# Patient Record
Sex: FEMALE | Race: WHITE | Employment: OTHER | URBAN - METROPOLITAN AREA
[De-identification: names, ages, dates, MRNs, and addresses within clinical notes are randomized per-mention and may not be internally consistent; named-entity substitution may affect disease eponyms.]

---

## 2017-07-12 LAB
ALBUMIN SERPL-MCNC: 4.1 G/DL
ALP BLD-CCNC: 88 U/L
ALT SERPL-CCNC: 20 U/L
ANION GAP SERPL CALCULATED.3IONS-SCNC: 2 MMOL/L
AST SERPL-CCNC: 20 U/L
AVERAGE GLUCOSE: NORMAL
BASOPHILS ABSOLUTE: 20 /ΜL
BASOPHILS RELATIVE PERCENT: 1 %
BILIRUB SERPL-MCNC: 0.6 MG/DL (ref 0.1–1.4)
BUN BLDV-MCNC: 20 MG/DL
CALCIUM SERPL-MCNC: 9.5 MG/DL
CHLORIDE BLD-SCNC: 105 MMOL/L
CHOLESTEROL, TOTAL: 223 MG/DL
CHOLESTEROL/HDL RATIO: 2.9
CO2: 31 MMOL/L
CREAT SERPL-MCNC: 0.89 MG/DL
EOSINOPHILS ABSOLUTE: 60 /ΜL
EOSINOPHILS RELATIVE PERCENT: 2 %
GFR CALCULATED: NORMAL
GLUCOSE BLD-MCNC: 95 MG/DL
HBA1C MFR BLD: 5.3 %
HCT VFR BLD CALC: 38.8 % (ref 36–46)
HDLC SERPL-MCNC: 77 MG/DL (ref 35–70)
HEMOGLOBIN: 12.5 G/DL (ref 12–16)
LDL CHOLESTEROL CALCULATED: 124 MG/DL (ref 0–160)
LYMPHOCYTES ABSOLUTE: 720 /ΜL
LYMPHOCYTES RELATIVE PERCENT: 23 %
MCH RBC QN AUTO: 31.2 PG
MCHC RBC AUTO-ENTMCNC: 32.2 G/DL
MCV RBC AUTO: 97 FL
MONOCYTES ABSOLUTE: 350 /ΜL
MONOCYTES RELATIVE PERCENT: 11 %
NEUTROPHILS ABSOLUTE: 2040 /ΜL
NEUTROPHILS RELATIVE PERCENT: 64 %
PDW BLD-RTO: 15.2 %
PLATELET # BLD: 137 K/ΜL
PMV BLD AUTO: 8.6 FL
POTASSIUM SERPL-SCNC: 4.6 MMOL/L
RBC # BLD: 4.01 10^6/ΜL
SODIUM BLD-SCNC: 143 MMOL/L
TOTAL PROTEIN: 6.2
TRIGL SERPL-MCNC: 111 MG/DL
TSH SERPL DL<=0.05 MIU/L-ACNC: 1.72 UIU/ML
VITAMIN D 25-HYDROXY: 34
VITAMIN D2, 25 HYDROXY: NORMAL
VITAMIN D3,25 HYDROXY: NORMAL
VLDLC SERPL CALC-MCNC: ABNORMAL MG/DL
WBC # BLD: 3.2 10^3/ML

## 2018-05-10 DIAGNOSIS — E55.9 VITAMIN D DEFICIENCY: ICD-10-CM

## 2018-05-10 DIAGNOSIS — R13.10 DYSPHAGIA, UNSPECIFIED TYPE: ICD-10-CM

## 2018-05-10 DIAGNOSIS — M15.0 PRIMARY GENERALIZED (OSTEO)ARTHRITIS: ICD-10-CM

## 2018-05-10 DIAGNOSIS — R53.83 FATIGUE, UNSPECIFIED TYPE: Primary | ICD-10-CM

## 2018-05-10 DIAGNOSIS — Z79.899 ENCOUNTER FOR LONG-TERM (CURRENT) USE OF MEDICATIONS: ICD-10-CM

## 2018-05-10 DIAGNOSIS — E78.5 HYPERLIPIDEMIA, UNSPECIFIED HYPERLIPIDEMIA TYPE: ICD-10-CM

## 2018-05-10 DIAGNOSIS — E03.9 ACQUIRED HYPOTHYROIDISM: ICD-10-CM

## 2018-05-10 DIAGNOSIS — R06.02 SOB (SHORTNESS OF BREATH): ICD-10-CM

## 2018-05-15 ENCOUNTER — OFFICE VISIT (OUTPATIENT)
Dept: PRIMARY CARE CLINIC | Age: 83
End: 2018-05-15
Payer: MEDICARE

## 2018-05-15 VITALS
RESPIRATION RATE: 24 BRPM | WEIGHT: 126 LBS | SYSTOLIC BLOOD PRESSURE: 122 MMHG | DIASTOLIC BLOOD PRESSURE: 80 MMHG | TEMPERATURE: 98.6 F | BODY MASS INDEX: 24.74 KG/M2 | OXYGEN SATURATION: 96 % | HEIGHT: 60 IN | HEART RATE: 71 BPM

## 2018-05-15 DIAGNOSIS — R53.83 FATIGUE, UNSPECIFIED TYPE: Primary | ICD-10-CM

## 2018-05-15 DIAGNOSIS — M89.9 DISORDER OF BONE: ICD-10-CM

## 2018-05-15 PROCEDURE — G8420 CALC BMI NORM PARAMETERS: HCPCS | Performed by: PHYSICIAN ASSISTANT

## 2018-05-15 PROCEDURE — G8399 PT W/DXA RESULTS DOCUMENT: HCPCS | Performed by: PHYSICIAN ASSISTANT

## 2018-05-15 PROCEDURE — 1123F ACP DISCUSS/DSCN MKR DOCD: CPT | Performed by: PHYSICIAN ASSISTANT

## 2018-05-15 PROCEDURE — G8427 DOCREV CUR MEDS BY ELIG CLIN: HCPCS | Performed by: PHYSICIAN ASSISTANT

## 2018-05-15 PROCEDURE — 99213 OFFICE O/P EST LOW 20 MIN: CPT | Performed by: PHYSICIAN ASSISTANT

## 2018-05-15 PROCEDURE — 1036F TOBACCO NON-USER: CPT | Performed by: PHYSICIAN ASSISTANT

## 2018-05-15 PROCEDURE — 1090F PRES/ABSN URINE INCON ASSESS: CPT | Performed by: PHYSICIAN ASSISTANT

## 2018-05-15 PROCEDURE — 4040F PNEUMOC VAC/ADMIN/RCVD: CPT | Performed by: PHYSICIAN ASSISTANT

## 2018-05-15 RX ORDER — PHENOL 1.4 %
2 AEROSOL, SPRAY (ML) MUCOUS MEMBRANE EVERY EVENING
COMMUNITY
End: 2018-09-14 | Stop reason: ALTCHOICE

## 2018-05-15 ASSESSMENT — ENCOUNTER SYMPTOMS
BLOOD IN STOOL: 0
SHORTNESS OF BREATH: 0
CONSTIPATION: 0
VOMITING: 0
CHEST TIGHTNESS: 0
COUGH: 0
NAUSEA: 0
WHEEZING: 0
DIARRHEA: 0
ABDOMINAL PAIN: 0
APNEA: 0

## 2018-05-17 ENCOUNTER — TELEPHONE (OUTPATIENT)
Dept: PRIMARY CARE CLINIC | Age: 83
End: 2018-05-17

## 2018-05-29 DIAGNOSIS — I89.0 LYMPHEDEMA: ICD-10-CM

## 2018-05-29 DIAGNOSIS — G47.00 INSOMNIA, UNSPECIFIED TYPE: ICD-10-CM

## 2018-05-29 DIAGNOSIS — I35.0 AORTIC VALVE STENOSIS, ETIOLOGY OF CARDIAC VALVE DISEASE UNSPECIFIED: ICD-10-CM

## 2018-05-29 DIAGNOSIS — R53.83 FATIGUE, UNSPECIFIED TYPE: Primary | ICD-10-CM

## 2018-05-29 DIAGNOSIS — M15.0 PRIMARY GENERALIZED (OSTEO)ARTHRITIS: ICD-10-CM

## 2018-05-29 DIAGNOSIS — E03.9 ACQUIRED HYPOTHYROIDISM: ICD-10-CM

## 2018-05-29 DIAGNOSIS — Z79.899 ENCOUNTER FOR LONG-TERM (CURRENT) USE OF MEDICATIONS: ICD-10-CM

## 2018-05-29 DIAGNOSIS — R13.10 DYSPHAGIA, UNSPECIFIED TYPE: ICD-10-CM

## 2018-05-29 DIAGNOSIS — E55.9 VITAMIN D DEFICIENCY: ICD-10-CM

## 2018-05-30 ENCOUNTER — HOSPITAL ENCOUNTER (OUTPATIENT)
Age: 83
Discharge: HOME | End: 2018-06-01
Payer: MEDICARE

## 2018-05-30 DIAGNOSIS — E55.9 VITAMIN D DEFICIENCY: ICD-10-CM

## 2018-05-30 DIAGNOSIS — R53.83 FATIGUE, UNSPECIFIED TYPE: ICD-10-CM

## 2018-05-30 DIAGNOSIS — I35.0 AORTIC VALVE STENOSIS, ETIOLOGY OF CARDIAC VALVE DISEASE UNSPECIFIED: ICD-10-CM

## 2018-05-30 DIAGNOSIS — R13.10 DYSPHAGIA, UNSPECIFIED TYPE: ICD-10-CM

## 2018-05-30 DIAGNOSIS — I89.0 LYMPHEDEMA: ICD-10-CM

## 2018-05-30 DIAGNOSIS — E03.9 ACQUIRED HYPOTHYROIDISM: ICD-10-CM

## 2018-05-30 DIAGNOSIS — M15.0 PRIMARY GENERALIZED (OSTEO)ARTHRITIS: ICD-10-CM

## 2018-05-30 DIAGNOSIS — Z79.899 ENCOUNTER FOR LONG-TERM (CURRENT) USE OF MEDICATIONS: ICD-10-CM

## 2018-05-30 LAB
ALBUMIN SERPL-MCNC: 4.1 G/DL (ref 3.5–5.2)
ALP BLD-CCNC: 85 U/L (ref 35–104)
ALT SERPL-CCNC: 15 U/L (ref 0–32)
ANION GAP SERPL CALCULATED.3IONS-SCNC: 13 MMOL/L (ref 7–16)
AST SERPL-CCNC: 20 U/L (ref 0–31)
BASOPHILS ABSOLUTE: 0.05 E9/L (ref 0–0.2)
BASOPHILS RELATIVE PERCENT: 1.6 % (ref 0–2)
BILIRUB SERPL-MCNC: 0.6 MG/DL (ref 0–1.2)
BUN BLDV-MCNC: 17 MG/DL (ref 8–23)
CALCIUM SERPL-MCNC: 9.2 MG/DL (ref 8.6–10.2)
CHLORIDE BLD-SCNC: 102 MMOL/L (ref 98–107)
CO2: 28 MMOL/L (ref 22–29)
CREAT SERPL-MCNC: 0.8 MG/DL (ref 0.5–1)
EOSINOPHILS ABSOLUTE: 0.11 E9/L (ref 0.05–0.5)
EOSINOPHILS RELATIVE PERCENT: 3.5 % (ref 0–6)
FOLATE: >20 NG/ML (ref 4.8–24.2)
GFR AFRICAN AMERICAN: >60
GFR NON-AFRICAN AMERICAN: >60 ML/MIN/1.73
GLUCOSE BLD-MCNC: 99 MG/DL (ref 74–109)
HCT VFR BLD CALC: 39.8 % (ref 34–48)
HEMOGLOBIN: 12.5 G/DL (ref 11.5–15.5)
IMMATURE GRANULOCYTES #: 0.01 E9/L
IMMATURE GRANULOCYTES %: 0.3 % (ref 0–5)
LYMPHOCYTES ABSOLUTE: 0.87 E9/L (ref 1.5–4)
LYMPHOCYTES RELATIVE PERCENT: 27.5 % (ref 20–42)
MCH RBC QN AUTO: 31.2 PG (ref 26–35)
MCHC RBC AUTO-ENTMCNC: 31.4 % (ref 32–34.5)
MCV RBC AUTO: 99.3 FL (ref 80–99.9)
MONOCYTES ABSOLUTE: 0.37 E9/L (ref 0.1–0.95)
MONOCYTES RELATIVE PERCENT: 11.7 % (ref 2–12)
NEUTROPHILS ABSOLUTE: 1.75 E9/L (ref 1.8–7.3)
NEUTROPHILS RELATIVE PERCENT: 55.4 % (ref 43–80)
PDW BLD-RTO: 14 FL (ref 11.5–15)
PLATELET # BLD: 148 E9/L (ref 130–450)
PMV BLD AUTO: 10.8 FL (ref 7–12)
POTASSIUM SERPL-SCNC: 4.2 MMOL/L (ref 3.5–5)
RBC # BLD: 4.01 E12/L (ref 3.5–5.5)
SODIUM BLD-SCNC: 143 MMOL/L (ref 132–146)
T4 FREE: 1.3 NG/DL (ref 0.93–1.7)
TOTAL PROTEIN: 6.3 G/DL (ref 6.4–8.3)
TSH SERPL DL<=0.05 MIU/L-ACNC: 2.21 UIU/ML (ref 0.27–4.2)
VITAMIN B-12: 842 PG/ML (ref 211–946)
VITAMIN D 25-HYDROXY: 52 NG/ML (ref 30–100)
WBC # BLD: 3.2 E9/L (ref 4.5–11.5)

## 2018-05-30 PROCEDURE — 84439 ASSAY OF FREE THYROXINE: CPT

## 2018-05-30 PROCEDURE — 85025 COMPLETE CBC W/AUTO DIFF WBC: CPT

## 2018-05-30 PROCEDURE — 82746 ASSAY OF FOLIC ACID SERUM: CPT

## 2018-05-30 PROCEDURE — 82306 VITAMIN D 25 HYDROXY: CPT

## 2018-05-30 PROCEDURE — 84443 ASSAY THYROID STIM HORMONE: CPT

## 2018-05-30 PROCEDURE — 82607 VITAMIN B-12: CPT

## 2018-05-30 PROCEDURE — 80053 COMPREHEN METABOLIC PANEL: CPT

## 2018-06-01 DIAGNOSIS — G47.00 INSOMNIA, UNSPECIFIED TYPE: ICD-10-CM

## 2018-06-01 RX ORDER — TRAZODONE HYDROCHLORIDE 50 MG/1
25 TABLET ORAL NIGHTLY
Qty: 15 TABLET | Refills: 0 | Status: SHIPPED | OUTPATIENT
Start: 2018-06-01 | End: 2018-07-09 | Stop reason: SDUPTHER

## 2018-06-01 RX ORDER — PANTOPRAZOLE SODIUM 20 MG/1
20 TABLET, DELAYED RELEASE ORAL DAILY
Qty: 30 TABLET | Refills: 0 | Status: SHIPPED | OUTPATIENT
Start: 2018-06-01 | End: 2018-06-27 | Stop reason: SDUPTHER

## 2018-06-01 RX ORDER — TRAZODONE HYDROCHLORIDE 50 MG/1
50 TABLET ORAL
COMMUNITY
End: 2018-06-01 | Stop reason: CLARIF

## 2018-06-08 ENCOUNTER — OFFICE VISIT (OUTPATIENT)
Dept: NEUROLOGY | Age: 83
End: 2018-06-08
Payer: MEDICARE

## 2018-06-08 VITALS
HEIGHT: 60 IN | OXYGEN SATURATION: 97 % | DIASTOLIC BLOOD PRESSURE: 80 MMHG | WEIGHT: 125.6 LBS | HEART RATE: 84 BPM | SYSTOLIC BLOOD PRESSURE: 175 MMHG | BODY MASS INDEX: 24.66 KG/M2

## 2018-06-08 DIAGNOSIS — G30.0 EARLY ONSET ALZHEIMER'S DEMENTIA WITHOUT BEHAVIORAL DISTURBANCE (HCC): Primary | ICD-10-CM

## 2018-06-08 DIAGNOSIS — F02.80 EARLY ONSET ALZHEIMER'S DEMENTIA WITHOUT BEHAVIORAL DISTURBANCE (HCC): Primary | ICD-10-CM

## 2018-06-08 PROCEDURE — 99214 OFFICE O/P EST MOD 30 MIN: CPT | Performed by: CLINICAL NURSE SPECIALIST

## 2018-06-08 PROCEDURE — G8399 PT W/DXA RESULTS DOCUMENT: HCPCS | Performed by: CLINICAL NURSE SPECIALIST

## 2018-06-08 PROCEDURE — 1123F ACP DISCUSS/DSCN MKR DOCD: CPT | Performed by: CLINICAL NURSE SPECIALIST

## 2018-06-08 PROCEDURE — 1036F TOBACCO NON-USER: CPT | Performed by: CLINICAL NURSE SPECIALIST

## 2018-06-08 PROCEDURE — G8420 CALC BMI NORM PARAMETERS: HCPCS | Performed by: CLINICAL NURSE SPECIALIST

## 2018-06-08 PROCEDURE — 1090F PRES/ABSN URINE INCON ASSESS: CPT | Performed by: CLINICAL NURSE SPECIALIST

## 2018-06-08 PROCEDURE — G8427 DOCREV CUR MEDS BY ELIG CLIN: HCPCS | Performed by: CLINICAL NURSE SPECIALIST

## 2018-06-08 PROCEDURE — 4040F PNEUMOC VAC/ADMIN/RCVD: CPT | Performed by: CLINICAL NURSE SPECIALIST

## 2018-06-27 RX ORDER — PANTOPRAZOLE SODIUM 20 MG/1
20 TABLET, DELAYED RELEASE ORAL DAILY
Qty: 90 TABLET | Refills: 0 | Status: SHIPPED | OUTPATIENT
Start: 2018-06-27 | End: 2018-09-14 | Stop reason: ALTCHOICE

## 2018-07-09 RX ORDER — TRAZODONE HYDROCHLORIDE 50 MG/1
25 TABLET ORAL NIGHTLY
Qty: 15 TABLET | Refills: 0 | Status: SHIPPED | OUTPATIENT
Start: 2018-07-09 | End: 2018-08-06 | Stop reason: SDUPTHER

## 2018-08-06 RX ORDER — TRAZODONE HYDROCHLORIDE 50 MG/1
TABLET ORAL
Qty: 15 TABLET | Refills: 0 | Status: SHIPPED | OUTPATIENT
Start: 2018-08-06 | End: 2018-09-28

## 2018-08-07 ENCOUNTER — HOSPITAL ENCOUNTER (OUTPATIENT)
Age: 83
Discharge: HOME | End: 2018-08-09
Payer: MEDICARE

## 2018-08-07 DIAGNOSIS — Z79.899 ENCOUNTER FOR LONG-TERM (CURRENT) USE OF MEDICATIONS: ICD-10-CM

## 2018-08-07 DIAGNOSIS — M15.0 PRIMARY GENERALIZED (OSTEO)ARTHRITIS: ICD-10-CM

## 2018-08-07 DIAGNOSIS — E03.9 ACQUIRED HYPOTHYROIDISM: ICD-10-CM

## 2018-08-07 DIAGNOSIS — R06.02 SOB (SHORTNESS OF BREATH): ICD-10-CM

## 2018-08-07 DIAGNOSIS — E78.5 HYPERLIPIDEMIA, UNSPECIFIED HYPERLIPIDEMIA TYPE: ICD-10-CM

## 2018-08-07 DIAGNOSIS — E55.9 VITAMIN D DEFICIENCY: ICD-10-CM

## 2018-08-07 DIAGNOSIS — R53.83 FATIGUE, UNSPECIFIED TYPE: ICD-10-CM

## 2018-08-07 DIAGNOSIS — R13.10 DYSPHAGIA, UNSPECIFIED TYPE: ICD-10-CM

## 2018-08-07 LAB
ALBUMIN SERPL-MCNC: 4.3 G/DL (ref 3.5–5.2)
ALP BLD-CCNC: 93 U/L (ref 35–104)
ALT SERPL-CCNC: 18 U/L (ref 0–32)
ANION GAP SERPL CALCULATED.3IONS-SCNC: 20 MMOL/L (ref 7–16)
AST SERPL-CCNC: 22 U/L (ref 0–31)
BACTERIA: ABNORMAL /HPF
BASOPHILS ABSOLUTE: 0.04 E9/L (ref 0–0.2)
BASOPHILS RELATIVE PERCENT: 1 % (ref 0–2)
BILIRUB SERPL-MCNC: 0.7 MG/DL (ref 0–1.2)
BILIRUBIN URINE: NEGATIVE
BLOOD, URINE: NEGATIVE
BUN BLDV-MCNC: 19 MG/DL (ref 8–23)
CALCIUM SERPL-MCNC: 9.5 MG/DL (ref 8.6–10.2)
CHLORIDE BLD-SCNC: 101 MMOL/L (ref 98–107)
CHOLESTEROL, TOTAL: 226 MG/DL (ref 0–199)
CLARITY: CLEAR
CO2: 24 MMOL/L (ref 22–29)
COLOR: YELLOW
CREAT SERPL-MCNC: 0.9 MG/DL (ref 0.5–1)
CRYSTALS, UA: ABNORMAL
EOSINOPHILS ABSOLUTE: 0.07 E9/L (ref 0.05–0.5)
EOSINOPHILS RELATIVE PERCENT: 1.7 % (ref 0–6)
GFR AFRICAN AMERICAN: >60
GFR NON-AFRICAN AMERICAN: 59 ML/MIN/1.73
GLUCOSE BLD-MCNC: 94 MG/DL (ref 74–109)
GLUCOSE URINE: NEGATIVE MG/DL
HBA1C MFR BLD: 5.5 % (ref 4–5.6)
HCT VFR BLD CALC: 41.1 % (ref 34–48)
HDLC SERPL-MCNC: 75 MG/DL
HEMOGLOBIN: 12.8 G/DL (ref 11.5–15.5)
IMMATURE GRANULOCYTES #: 0.01 E9/L
IMMATURE GRANULOCYTES %: 0.2 % (ref 0–5)
KETONES, URINE: NEGATIVE MG/DL
LDL CHOLESTEROL CALCULATED: 132 MG/DL (ref 0–99)
LEUKOCYTE ESTERASE, URINE: ABNORMAL
LYMPHOCYTES ABSOLUTE: 1.1 E9/L (ref 1.5–4)
LYMPHOCYTES RELATIVE PERCENT: 26.4 % (ref 20–42)
MCH RBC QN AUTO: 30.7 PG (ref 26–35)
MCHC RBC AUTO-ENTMCNC: 31.1 % (ref 32–34.5)
MCV RBC AUTO: 98.6 FL (ref 80–99.9)
MONOCYTES ABSOLUTE: 0.54 E9/L (ref 0.1–0.95)
MONOCYTES RELATIVE PERCENT: 13 % (ref 2–12)
NEUTROPHILS ABSOLUTE: 2.4 E9/L (ref 1.8–7.3)
NEUTROPHILS RELATIVE PERCENT: 57.7 % (ref 43–80)
NITRITE, URINE: NEGATIVE
PDW BLD-RTO: 14.2 FL (ref 11.5–15)
PH UA: 5.5 (ref 5–9)
PLATELET # BLD: 150 E9/L (ref 130–450)
PMV BLD AUTO: 11.2 FL (ref 7–12)
POTASSIUM SERPL-SCNC: 4.4 MMOL/L (ref 3.5–5)
PROTEIN UA: NEGATIVE MG/DL
RBC # BLD: 4.17 E12/L (ref 3.5–5.5)
RBC UA: ABNORMAL /HPF (ref 0–2)
SODIUM BLD-SCNC: 145 MMOL/L (ref 132–146)
SPECIFIC GRAVITY UA: >=1.03 (ref 1–1.03)
TOTAL PROTEIN: 6.7 G/DL (ref 6.4–8.3)
TRIGL SERPL-MCNC: 97 MG/DL (ref 0–149)
TSH SERPL DL<=0.05 MIU/L-ACNC: 1.36 UIU/ML (ref 0.27–4.2)
URIC ACID, SERUM: 4.5 MG/DL (ref 2.4–5.7)
UROBILINOGEN, URINE: 0.2 E.U./DL
VITAMIN D 25-HYDROXY: 57 NG/ML (ref 30–100)
VLDLC SERPL CALC-MCNC: 19 MG/DL
WBC # BLD: 4.2 E9/L (ref 4.5–11.5)
WBC UA: ABNORMAL /HPF (ref 0–5)

## 2018-08-07 PROCEDURE — 84443 ASSAY THYROID STIM HORMONE: CPT

## 2018-08-07 PROCEDURE — 85025 COMPLETE CBC W/AUTO DIFF WBC: CPT

## 2018-08-07 PROCEDURE — 84550 ASSAY OF BLOOD/URIC ACID: CPT

## 2018-08-07 PROCEDURE — 83036 HEMOGLOBIN GLYCOSYLATED A1C: CPT

## 2018-08-07 PROCEDURE — 81001 URINALYSIS AUTO W/SCOPE: CPT

## 2018-08-07 PROCEDURE — 80061 LIPID PANEL: CPT

## 2018-08-07 PROCEDURE — 82306 VITAMIN D 25 HYDROXY: CPT

## 2018-08-07 PROCEDURE — 80053 COMPREHEN METABOLIC PANEL: CPT

## 2018-08-13 ENCOUNTER — OFFICE VISIT (OUTPATIENT)
Dept: PRIMARY CARE CLINIC | Age: 83
End: 2018-08-13
Payer: MEDICARE

## 2018-08-13 VITALS
TEMPERATURE: 98.9 F | HEART RATE: 83 BPM | DIASTOLIC BLOOD PRESSURE: 58 MMHG | WEIGHT: 128 LBS | BODY MASS INDEX: 25.13 KG/M2 | SYSTOLIC BLOOD PRESSURE: 108 MMHG | OXYGEN SATURATION: 95 % | HEIGHT: 60 IN | RESPIRATION RATE: 16 BRPM

## 2018-08-13 DIAGNOSIS — Z23 NEED FOR PROPHYLACTIC VACCINATION AGAINST STREPTOCOCCUS PNEUMONIAE (PNEUMOCOCCUS): Primary | ICD-10-CM

## 2018-08-13 DIAGNOSIS — R53.83 FATIGUE, UNSPECIFIED TYPE: ICD-10-CM

## 2018-08-13 DIAGNOSIS — Z12.31 ENCOUNTER FOR SCREENING MAMMOGRAM FOR MALIGNANT NEOPLASM OF BREAST: ICD-10-CM

## 2018-08-13 DIAGNOSIS — E78.2 MIXED HYPERLIPIDEMIA: ICD-10-CM

## 2018-08-13 DIAGNOSIS — C85.99 GASTRIC LYMPHOMA (HCC): ICD-10-CM

## 2018-08-13 DIAGNOSIS — I89.0 LYMPHEDEMA OF BOTH LOWER EXTREMITIES: Chronic | ICD-10-CM

## 2018-08-13 DIAGNOSIS — M51.9 LUMBAR DISC DISEASE: ICD-10-CM

## 2018-08-13 DIAGNOSIS — R13.19 ESOPHAGEAL DYSPHAGIA: ICD-10-CM

## 2018-08-13 DIAGNOSIS — Z00.00 ROUTINE GENERAL MEDICAL EXAMINATION AT A HEALTH CARE FACILITY: ICD-10-CM

## 2018-08-13 DIAGNOSIS — Z83.3 FHX: DIABETES MELLITUS: ICD-10-CM

## 2018-08-13 DIAGNOSIS — G47.9 SLEEP DISORDER: ICD-10-CM

## 2018-08-13 DIAGNOSIS — I35.1 NONRHEUMATIC AORTIC VALVE INSUFFICIENCY: Chronic | ICD-10-CM

## 2018-08-13 PROCEDURE — 4040F PNEUMOC VAC/ADMIN/RCVD: CPT | Performed by: INTERNAL MEDICINE

## 2018-08-13 PROCEDURE — G0439 PPPS, SUBSEQ VISIT: HCPCS | Performed by: INTERNAL MEDICINE

## 2018-08-13 ASSESSMENT — ANXIETY QUESTIONNAIRES: GAD7 TOTAL SCORE: 2

## 2018-08-13 ASSESSMENT — PATIENT HEALTH QUESTIONNAIRE - PHQ9
SUM OF ALL RESPONSES TO PHQ QUESTIONS 1-9: 2
SUM OF ALL RESPONSES TO PHQ QUESTIONS 1-9: 2

## 2018-08-13 ASSESSMENT — LIFESTYLE VARIABLES: HOW OFTEN DO YOU HAVE A DRINK CONTAINING ALCOHOL: 0

## 2018-08-13 NOTE — PROGRESS NOTES
This patient was last in the office on 18 she was back again for complaints of fatigue on 5/15/18. She was describing inability to sleep. Has a host of ongoing chronic conditions and after reviewing, nothing much is any change with regards to the fact that she's having a subsequent annual wellness visit today. Her medications will stay the same. We did discuss other chronic conditions which would include the followin esophageal narrowing with dysphagia secondary to esophagitis , we'll need to see  at this time, referral made    2. Sleep disturbance with chronic fatigue. Referral made to sleep specialist Dr. Ene Elizalde    3. Aortic valve disease, saw Dr. Nguyen her cardiologist recently will see him in 6 months    4. Chronic lumbar disc disease , sees , pain management, will have epidural injection in the coming few days    5. Hyper lipidemia    6. Family history of type 2 diabetes    7. History of gastric lymphoma, followed  Oncologist [facility], appointment made for follow-up with this doctor for the patient    8. Hyperlipidemia    9. Patient is not had mammogram that she can recall having no records can be found. Will review this with her the next time I see her and order it at that time    10. Vitamin D deficiency, on vitamin D replacement with good results    11. Gait disturbance secondary to lumbar disc disease uses a cane or walker when trying to walk any distance. Must be aware of potential for falls    Reviewed diagnostic lab studies done prior to this visit to generally at goal with the exception of lipids are slightly elevated. Plan:  1. Continue current meds  2. I'll arrange consultation with gastroenterology, pulmonary, oncology  3. No longer seeing neurology who have seen her before for memory dysfunction? 4. I will update laboratory studies prior to next visit with me in 6 months  5. Immunizations reviewed, they're up-to-date.

## 2018-08-13 NOTE — PROGRESS NOTES
Medicare Annual Wellness Visit  Name: Vonda Armando Date: 2018   MRN: [id number] Sex: Female   Age: 80 y.o. Ethnicity: Non-/Non    : 3/14/1933 Race: Troy Burnett is here for Medicare AWV; Depression; Hypertension; Gastroesophageal Reflux; Discuss Labs; and Health Maintenance (due for tdap, shingles,pneumo)    Screenings for behavioral, psychosocial and functional/safety risks, and cognitive dysfunction are all negative except as indicated below. These results, as well as other patient data from the [address] form, are documented in Flowsheets linked to this Encounter. Allergies   Allergen Reactions    Codeine     Vicodin [Hydrocodone-Acetaminophen]      Prior to Visit Medications    Medication Sig Taking?  Authorizing Provider   traZODone (DESYREL) 50 MG tablet TAKE 1/2 TABLET BY MOUTH NIGHTLY Yes Samantha Snowden MD   pantoprazole (PROTONIX) 20 MG tablet Take 1 tablet by mouth daily Yes Samantha Snowden MD   Melatonin 10 MG TABS Take 2 tablets by mouth every evening  Yes Historical Provider, MD   Multiple Vitamins-Minerals (PRESERVISION AREDS 2 PO) Take by mouth Yes Historical Provider, MD   Coenzyme Q10 (COQ10) 100 MG CAPS Take by mouth daily Yes Historical Provider, MD   Multiple Vitamin (MULTI VITAMIN DAILY PO) Take by mouth Yes Historical Provider, MD   irbesartan (AVAPRO) 300 MG tablet Take 300 mg by mouth daily  Yes Historical Provider, MD     Past Medical History:   Diagnosis Date    Anxiety     Arthritis     Back pain     Blood circulation, collateral     Cancer of esophagus ([facility] [address]     Carotid artery stenosis     Carotid bruit 2012    Carotid stenosis, right 2012    GERD (gastroesophageal reflux disease)     GERD (gastroesophageal reflux disease)     Hypertension     Lymphedema of lower extremity 2012    Peripheral vascular disease ([facility] [address]      Past Surgical History:   Procedure Laterality Date    CARDIAC SURGERY range of motion, no joint swelling, deformity or tenderness  Neurologic: reflexes normal and symmetric, no cranial nerve deficit, gait, coordination and speech normal    Patient's complete Health Risk Assessment and screening values have been reviewed and are found in Flowsheets. The following problems were reviewed today and where indicated follow up appointments were made and/or referrals ordered. Positive Risk Factor Screenings with Interventions:     General Health:  General  In general, how would you say your health is?: Good  In the past 7 days, have you experienced any of the following?: (!) New or Increased Fatigue, Stress  Do you get the social and emotional support that you need?: Yes  Do you have a Living Will?: Yes  General Health Risk Interventions:  · None indicated    Health Habits/Nutrition:  Health Habits/Nutrition  Do you exercise for at least 20 minutes 2-3 times per week?: Yes  Have you lost any weight without trying in the past 3 months?: No  Do you eat fewer than 2 meals per day?: (!) Yes  Have you seen a dentist within the past year?: Yes  Body mass index is 25 kg/m².   Health Habits/Nutrition Interventions:  · None indicated    Hearing/Vision:  Hearing/Vision  Do you or your family notice any trouble with your hearing?: (!) Yes  Do you have difficulty driving, watching TV, or doing any of your daily activities because of your eyesight?: (!) Yes  Have you had an eye exam within the past year?: Yes  Hearing/Vision Interventions:  · None indicated    Safety:  Safety  Do you have working smoke detectors?: (!) No  Have all throw rugs been removed or fastened?: Yes  Do you have non-slip mats in all bathtubs?: Yes  Do all of your stairways have a railing or banister?: Yes  Are your doorways, halls and stairs free of clutter?: Yes  Do you always fasten your seatbelt when you are in a car?: Yes  Safety Interventions:  · None indicated    ADL:  ADLs  In the past 7 days, did you need help from others

## 2018-08-16 ENCOUNTER — APPOINTMENT (OUTPATIENT)
Dept: CT IMAGING | Age: 83
DRG: 184 | End: 2018-08-16
Payer: OTHER MISCELLANEOUS

## 2018-08-16 ENCOUNTER — HOSPITAL ENCOUNTER (INPATIENT)
Age: 83
LOS: 4 days | Discharge: NURSING, REHAB OR LONG-TERM CARE | DRG: 184 | End: 2018-08-21
Attending: EMERGENCY MEDICINE | Admitting: SURGERY
Payer: OTHER MISCELLANEOUS

## 2018-08-16 ENCOUNTER — APPOINTMENT (OUTPATIENT)
Dept: GENERAL RADIOLOGY | Age: 83
DRG: 184 | End: 2018-08-16
Payer: OTHER MISCELLANEOUS

## 2018-08-16 DIAGNOSIS — S39.91XA BLUNT TRAUMA TO ABDOMEN, INITIAL ENCOUNTER: ICD-10-CM
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
## 2018-08-16 DIAGNOSIS — S29.9XXA CHEST WALL TRAUMA: ICD-10-CM

## 2018-08-16 DIAGNOSIS — S22.20XA CLOSED FRACTURE OF STERNUM, UNSPECIFIED PORTION OF STERNUM, INITIAL ENCOUNTER: ICD-10-CM

## 2018-08-16 LAB
ALBUMIN SERPL-MCNC: 4 G/DL (ref 3.5–5.2)
ALP BLD-CCNC: 96 U/L (ref 35–104)
ALT SERPL-CCNC: 23 U/L (ref 0–32)
AMYLASE: 81 U/L (ref 20–100)
ANION GAP SERPL CALCULATED.3IONS-SCNC: 12 MMOL/L (ref 7–16)
APTT: 25.3 SEC (ref 24.5–35.1)
AST SERPL-CCNC: 26 U/L (ref 0–31)
BACTERIA: NORMAL /HPF
BILIRUB SERPL-MCNC: 0.5 MG/DL (ref 0–1.2)
BILIRUBIN DIRECT: <0.2 MG/DL (ref 0–0.3)
BILIRUBIN URINE: NEGATIVE
BILIRUBIN, INDIRECT: NORMAL MG/DL (ref 0–1)
BLOOD, URINE: NEGATIVE
BUN BLDV-MCNC: 22 MG/DL (ref 8–23)
CALCIUM SERPL-MCNC: 9.5 MG/DL (ref 8.6–10.2)
CASTS: NORMAL /LPF
CHLORIDE BLD-SCNC: 102 MMOL/L (ref 98–107)
CK MB: 1.9 NG/ML (ref 0–4.3)
CLARITY: CLEAR
CO2: 27 MMOL/L (ref 22–29)
COLOR: YELLOW
CREAT SERPL-MCNC: 0.9 MG/DL (ref 0.5–1)
EKG ATRIAL RATE: 91 BPM
EKG P AXIS: 65 DEGREES
EKG P-R INTERVAL: 198 MS
EKG Q-T INTERVAL: 346 MS
EKG QRS DURATION: 92 MS
EKG QTC CALCULATION (BAZETT): 425 MS
EKG R AXIS: 29 DEGREES
EKG T AXIS: 60 DEGREES
EKG VENTRICULAR RATE: 91 BPM
EPITHELIAL CELLS, UA: NORMAL /HPF
GFR AFRICAN AMERICAN: >60
GFR NON-AFRICAN AMERICAN: 59 ML/MIN/1.73
GLUCOSE BLD-MCNC: 100 MG/DL (ref 74–109)
GLUCOSE URINE: NEGATIVE MG/DL
HCT VFR BLD CALC: 40.1 % (ref 34–48)
HEMOGLOBIN: 13.4 G/DL (ref 11.5–15.5)
INR BLD: 1
KETONES, URINE: NEGATIVE MG/DL
LACTIC ACID: 0.7 MMOL/L (ref 0.5–2.2)
LEUKOCYTE ESTERASE, URINE: ABNORMAL
LIPASE: 45 U/L (ref 13–60)
MAGNESIUM: 2.2 MG/DL (ref 1.6–2.6)
MCH RBC QN AUTO: 31.8 PG (ref 26–35)
MCHC RBC AUTO-ENTMCNC: 33.4 % (ref 32–34.5)
MCV RBC AUTO: 95 FL (ref 80–99.9)
NITRITE, URINE: NEGATIVE
PDW BLD-RTO: 14.2 FL (ref 11.5–15)
PH UA: 7 (ref 5–9)
PLATELET # BLD: 146 E9/L (ref 130–450)
PMV BLD AUTO: 10 FL (ref 7–12)
POTASSIUM SERPL-SCNC: 4.3 MMOL/L (ref 3.5–5)
PRO-BNP: 575 PG/ML (ref 0–450)
PROTEIN UA: NEGATIVE MG/DL
PROTHROMBIN TIME: 11.4 SEC (ref 9.3–12.4)
RBC # BLD: 4.22 E12/L (ref 3.5–5.5)
RBC UA: NORMAL /HPF (ref 0–2)
SODIUM BLD-SCNC: 141 MMOL/L (ref 132–146)
SPECIFIC GRAVITY UA: 1.01 (ref 1–1.03)
TOTAL CK: 56 U/L (ref 20–180)
TOTAL PROTEIN: 6.6 G/DL (ref 6.4–8.3)
TROPONIN: <0.01 NG/ML (ref 0–0.03)
UROBILINOGEN, URINE: 0.2 E.U./DL
WBC # BLD: 4.9 E9/L (ref 4.5–11.5)
WBC UA: NORMAL /HPF (ref 0–5)

## 2018-08-16 PROCEDURE — 83605 ASSAY OF LACTIC ACID: CPT

## 2018-08-16 PROCEDURE — 82553 CREATINE MB FRACTION: CPT

## 2018-08-16 PROCEDURE — 74177 CT ABD & PELVIS W/CONTRAST: CPT

## 2018-08-16 PROCEDURE — 99285 EMERGENCY DEPT VISIT HI MDM: CPT

## 2018-08-16 PROCEDURE — 81001 URINALYSIS AUTO W/SCOPE: CPT

## 2018-08-16 PROCEDURE — 85730 THROMBOPLASTIN TIME PARTIAL: CPT

## 2018-08-16 PROCEDURE — 83735 ASSAY OF MAGNESIUM: CPT

## 2018-08-16 PROCEDURE — 96374 THER/PROPH/DIAG INJ IV PUSH: CPT

## 2018-08-16 PROCEDURE — 36415 COLL VENOUS BLD VENIPUNCTURE: CPT

## 2018-08-16 PROCEDURE — 71045 X-RAY EXAM CHEST 1 VIEW: CPT

## 2018-08-16 PROCEDURE — 85610 PROTHROMBIN TIME: CPT

## 2018-08-16 PROCEDURE — 80076 HEPATIC FUNCTION PANEL: CPT

## 2018-08-16 PROCEDURE — 82550 ASSAY OF CK (CPK): CPT

## 2018-08-16 PROCEDURE — 6360000002 HC RX W HCPCS: Performed by: EMERGENCY MEDICINE

## 2018-08-16 PROCEDURE — 93005 ELECTROCARDIOGRAM TRACING: CPT | Performed by: EMERGENCY MEDICINE

## 2018-08-16 PROCEDURE — 85027 COMPLETE CBC AUTOMATED: CPT

## 2018-08-16 PROCEDURE — 83880 ASSAY OF NATRIURETIC PEPTIDE: CPT

## 2018-08-16 PROCEDURE — 96375 TX/PRO/DX INJ NEW DRUG ADDON: CPT

## 2018-08-16 PROCEDURE — 84484 ASSAY OF TROPONIN QUANT: CPT

## 2018-08-16 PROCEDURE — 82150 ASSAY OF AMYLASE: CPT

## 2018-08-16 PROCEDURE — 71260 CT THORAX DX C+: CPT

## 2018-08-16 PROCEDURE — 83690 ASSAY OF LIPASE: CPT

## 2018-08-16 PROCEDURE — 80048 BASIC METABOLIC PNL TOTAL CA: CPT

## 2018-08-16 RX ORDER — MORPHINE SULFATE 2 MG/ML
2 INJECTION, SOLUTION INTRAMUSCULAR; INTRAVENOUS ONCE
Status: COMPLETED | OUTPATIENT
Start: 2018-08-16 | End: 2018-08-16

## 2018-08-16 RX ORDER — ONDANSETRON 2 MG/ML
4 INJECTION INTRAMUSCULAR; INTRAVENOUS ONCE
Status: COMPLETED | OUTPATIENT
Start: 2018-08-16 | End: 2018-08-16

## 2018-08-16 RX ADMIN — MORPHINE SULFATE 2 MG: 2 INJECTION, SOLUTION INTRAMUSCULAR; INTRAVENOUS at 22:07

## 2018-08-16 RX ADMIN — ONDANSETRON 4 MG: 2 INJECTION, SOLUTION INTRAMUSCULAR; INTRAVENOUS at 22:07

## 2018-08-16 ASSESSMENT — PAIN DESCRIPTION - FREQUENCY: FREQUENCY: CONTINUOUS

## 2018-08-16 ASSESSMENT — PAIN DESCRIPTION - ORIENTATION: ORIENTATION: MID

## 2018-08-16 ASSESSMENT — PAIN SCALES - GENERAL
PAINLEVEL_OUTOF10: 9
PAINLEVEL_OUTOF10: 8
PAINLEVEL_OUTOF10: 8

## 2018-08-16 ASSESSMENT — PAIN DESCRIPTION - LOCATION
LOCATION: CHEST
LOCATION: CHEST

## 2018-08-16 ASSESSMENT — PAIN DESCRIPTION - DESCRIPTORS
DESCRIPTORS: DISCOMFORT
DESCRIPTORS: SORE;ACHING;SHARP

## 2018-08-16 ASSESSMENT — PAIN DESCRIPTION - PAIN TYPE: TYPE: ACUTE PAIN

## 2018-08-17 PROBLEM — S22.22XA FRACTURE OF BODY OF STERNUM, INITIAL ENCOUNTER FOR CLOSED FRACTURE: Status: ACTIVE | Noted: 2018-08-17

## 2018-08-17 PROBLEM — S29.9XXA CHEST WALL TRAUMA: Status: ACTIVE | Noted: 2018-08-17

## 2018-08-17 PROBLEM — S22.20XA CLOSED FRACTURE OF STERNUM: Status: ACTIVE | Noted: 2018-08-17
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
## 2018-08-17 PROBLEM — S39.91XA BLUNT INJURY OF ABDOMEN: Status: ACTIVE | Noted: 2018-08-17

## 2018-08-17 LAB
ALBUMIN SERPL-MCNC: 4 G/DL (ref 3.5–5.2)
ALP BLD-CCNC: 90 U/L (ref 35–104)
ALT SERPL-CCNC: 20 U/L (ref 0–32)
ANION GAP SERPL CALCULATED.3IONS-SCNC: 13 MMOL/L (ref 7–16)
AST SERPL-CCNC: 24 U/L (ref 0–31)
BASOPHILS ABSOLUTE: 0.05 E9/L (ref 0–0.2)
BASOPHILS RELATIVE PERCENT: 0.6 % (ref 0–2)
BILIRUB SERPL-MCNC: 0.7 MG/DL (ref 0–1.2)
BUN BLDV-MCNC: 17 MG/DL (ref 8–23)
CALCIUM SERPL-MCNC: 9.2 MG/DL (ref 8.6–10.2)
CHLORIDE BLD-SCNC: 101 MMOL/L (ref 98–107)
CO2: 29 MMOL/L (ref 22–29)
CREAT SERPL-MCNC: 0.8 MG/DL (ref 0.5–1)
EOSINOPHILS ABSOLUTE: 0.13 E9/L (ref 0.05–0.5)
EOSINOPHILS RELATIVE PERCENT: 1.6 % (ref 0–6)
GFR AFRICAN AMERICAN: >60
GFR NON-AFRICAN AMERICAN: >60 ML/MIN/1.73
GLUCOSE BLD-MCNC: 92 MG/DL (ref 74–109)
HCT VFR BLD CALC: 40 % (ref 34–48)
HEMOGLOBIN: 13.3 G/DL (ref 11.5–15.5)
IMMATURE GRANULOCYTES #: 0.06 E9/L
IMMATURE GRANULOCYTES %: 0.7 % (ref 0–5)
LYMPHOCYTES ABSOLUTE: 1.13 E9/L (ref 1.5–4)
LYMPHOCYTES RELATIVE PERCENT: 14 % (ref 20–42)
MCH RBC QN AUTO: 31.4 PG (ref 26–35)
MCHC RBC AUTO-ENTMCNC: 33.3 % (ref 32–34.5)
MCV RBC AUTO: 94.3 FL (ref 80–99.9)
METER GLUCOSE: 116 MG/DL (ref 70–110)
MONOCYTES ABSOLUTE: 0.65 E9/L (ref 0.1–0.95)
MONOCYTES RELATIVE PERCENT: 8 % (ref 2–12)
NEUTROPHILS ABSOLUTE: 6.07 E9/L (ref 1.8–7.3)
NEUTROPHILS RELATIVE PERCENT: 75.1 % (ref 43–80)
PDW BLD-RTO: 14.2 FL (ref 11.5–15)
PLATELET # BLD: 143 E9/L (ref 130–450)
PMV BLD AUTO: 10.5 FL (ref 7–12)
POTASSIUM REFLEX MAGNESIUM: 4.8 MMOL/L (ref 3.5–5)
RBC # BLD: 4.24 E12/L (ref 3.5–5.5)
SODIUM BLD-SCNC: 143 MMOL/L (ref 132–146)
TOTAL PROTEIN: 6.3 G/DL (ref 6.4–8.3)
WBC # BLD: 8.1 E9/L (ref 4.5–11.5)

## 2018-08-17 PROCEDURE — 6360000004 HC RX CONTRAST MEDICATION: Performed by: RADIOLOGY

## 2018-08-17 PROCEDURE — 85025 COMPLETE CBC W/AUTO DIFF WBC: CPT

## 2018-08-17 PROCEDURE — G8978 MOBILITY CURRENT STATUS: HCPCS

## 2018-08-17 PROCEDURE — G8988 SELF CARE GOAL STATUS: HCPCS

## 2018-08-17 PROCEDURE — 82962 GLUCOSE BLOOD TEST: CPT

## 2018-08-17 PROCEDURE — 2060000000 HC ICU INTERMEDIATE R&B

## 2018-08-17 PROCEDURE — 99233 SBSQ HOSP IP/OBS HIGH 50: CPT | Performed by: SURGERY

## 2018-08-17 PROCEDURE — G8987 SELF CARE CURRENT STATUS: HCPCS

## 2018-08-17 PROCEDURE — 97535 SELF CARE MNGMENT TRAINING: CPT

## 2018-08-17 PROCEDURE — 6370000000 HC RX 637 (ALT 250 FOR IP): Performed by: STUDENT IN AN ORGANIZED HEALTH CARE EDUCATION/TRAINING PROGRAM

## 2018-08-17 PROCEDURE — G8979 MOBILITY GOAL STATUS: HCPCS

## 2018-08-17 PROCEDURE — 97530 THERAPEUTIC ACTIVITIES: CPT

## 2018-08-17 PROCEDURE — 36415 COLL VENOUS BLD VENIPUNCTURE: CPT

## 2018-08-17 PROCEDURE — 80053 COMPREHEN METABOLIC PANEL: CPT

## 2018-08-17 PROCEDURE — 97162 PT EVAL MOD COMPLEX 30 MIN: CPT

## 2018-08-17 PROCEDURE — 97166 OT EVAL MOD COMPLEX 45 MIN: CPT

## 2018-08-17 PROCEDURE — 2580000003 HC RX 258: Performed by: RADIOLOGY

## 2018-08-17 PROCEDURE — 2580000003 HC RX 258: Performed by: STUDENT IN AN ORGANIZED HEALTH CARE EDUCATION/TRAINING PROGRAM

## 2018-08-17 RX ORDER — MORPHINE SULFATE 2 MG/ML
2 INJECTION, SOLUTION INTRAMUSCULAR; INTRAVENOUS EVERY 4 HOURS PRN
Status: DISCONTINUED | OUTPATIENT
Start: 2018-08-17 | End: 2018-08-17

## 2018-08-17 RX ORDER — LIDOCAINE 50 MG/G
1 PATCH TOPICAL DAILY
Status: DISCONTINUED | OUTPATIENT
Start: 2018-08-17 | End: 2018-08-21 | Stop reason: HOSPADM

## 2018-08-17 RX ORDER — NALOXONE HYDROCHLORIDE 0.4 MG/ML
INJECTION, SOLUTION INTRAMUSCULAR; INTRAVENOUS; SUBCUTANEOUS
Status: DISPENSED
Start: 2018-08-17 | End: 2018-08-18

## 2018-08-17 RX ORDER — TRAZODONE HYDROCHLORIDE 50 MG/1
25 TABLET ORAL NIGHTLY
Status: DISCONTINUED | OUTPATIENT
Start: 2018-08-17 | End: 2018-08-21 | Stop reason: HOSPADM

## 2018-08-17 RX ORDER — SODIUM CHLORIDE 0.9 % (FLUSH) 0.9 %
10 SYRINGE (ML) INJECTION PRN
Status: DISCONTINUED | OUTPATIENT
Start: 2018-08-17 | End: 2018-08-21 | Stop reason: HOSPADM

## 2018-08-17 RX ORDER — OXYCODONE HYDROCHLORIDE AND ACETAMINOPHEN 5; 325 MG/1; MG/1
1 TABLET ORAL EVERY 4 HOURS PRN
Status: DISCONTINUED | OUTPATIENT
Start: 2018-08-17 | End: 2018-08-17

## 2018-08-17 RX ORDER — BISACODYL 10 MG
10 SUPPOSITORY, RECTAL RECTAL DAILY PRN
Status: DISCONTINUED | OUTPATIENT
Start: 2018-08-17 | End: 2018-08-21 | Stop reason: HOSPADM

## 2018-08-17 RX ORDER — SODIUM CHLORIDE 0.9 % (FLUSH) 0.9 %
10 SYRINGE (ML) INJECTION PRN
Status: COMPLETED | OUTPATIENT
Start: 2018-08-17 | End: 2018-08-17

## 2018-08-17 RX ORDER — ACETAMINOPHEN 325 MG/1
650 TABLET ORAL EVERY 4 HOURS PRN
Status: DISCONTINUED | OUTPATIENT
Start: 2018-08-17 | End: 2018-08-21 | Stop reason: HOSPADM

## 2018-08-17 RX ORDER — METHOCARBAMOL 500 MG/1
500 TABLET, FILM COATED ORAL 4 TIMES DAILY
Status: DISCONTINUED | OUTPATIENT
Start: 2018-08-17 | End: 2018-08-17

## 2018-08-17 RX ORDER — METHOCARBAMOL 500 MG/1
500 TABLET, FILM COATED ORAL 4 TIMES DAILY
Status: DISCONTINUED | OUTPATIENT
Start: 2018-08-17 | End: 2018-08-21 | Stop reason: HOSPADM

## 2018-08-17 RX ORDER — ONDANSETRON 2 MG/ML
4 INJECTION INTRAMUSCULAR; INTRAVENOUS EVERY 6 HOURS PRN
Status: DISCONTINUED | OUTPATIENT
Start: 2018-08-17 | End: 2018-08-21 | Stop reason: HOSPADM

## 2018-08-17 RX ORDER — DOCUSATE SODIUM 100 MG/1
100 CAPSULE, LIQUID FILLED ORAL 2 TIMES DAILY
Status: DISCONTINUED | OUTPATIENT
Start: 2018-08-17 | End: 2018-08-21 | Stop reason: HOSPADM

## 2018-08-17 RX ORDER — MORPHINE SULFATE 2 MG/ML
1 INJECTION, SOLUTION INTRAMUSCULAR; INTRAVENOUS EVERY 4 HOURS PRN
Status: DISCONTINUED | OUTPATIENT
Start: 2018-08-17 | End: 2018-08-19 | Stop reason: SDUPTHER

## 2018-08-17 RX ORDER — OXYCODONE HYDROCHLORIDE AND ACETAMINOPHEN 5; 325 MG/1; MG/1
2 TABLET ORAL EVERY 4 HOURS PRN
Status: DISCONTINUED | OUTPATIENT
Start: 2018-08-17 | End: 2018-08-17

## 2018-08-17 RX ORDER — MORPHINE SULFATE 2 MG/ML
2 INJECTION, SOLUTION INTRAMUSCULAR; INTRAVENOUS EVERY 4 HOURS PRN
Status: DISCONTINUED | OUTPATIENT
Start: 2018-08-17 | End: 2018-08-17 | Stop reason: SDUPTHER

## 2018-08-17 RX ORDER — GABAPENTIN 100 MG/1
100 CAPSULE ORAL 3 TIMES DAILY
Status: DISCONTINUED | OUTPATIENT
Start: 2018-08-17 | End: 2018-08-21 | Stop reason: HOSPADM

## 2018-08-17 RX ORDER — SODIUM CHLORIDE 9 MG/ML
INJECTION, SOLUTION INTRAVENOUS CONTINUOUS
Status: DISCONTINUED | OUTPATIENT
Start: 2018-08-17 | End: 2018-08-19

## 2018-08-17 RX ORDER — LANOLIN ALCOHOL/MO/W.PET/CERES
9 CREAM (GRAM) TOPICAL EVERY EVENING
Status: DISCONTINUED | OUTPATIENT
Start: 2018-08-17 | End: 2018-08-21 | Stop reason: HOSPADM

## 2018-08-17 RX ORDER — LOSARTAN POTASSIUM 50 MG/1
100 TABLET ORAL DAILY
Status: DISCONTINUED | OUTPATIENT
Start: 2018-08-17 | End: 2018-08-21 | Stop reason: HOSPADM

## 2018-08-17 RX ORDER — OXYCODONE HYDROCHLORIDE AND ACETAMINOPHEN 5; 325 MG/1; MG/1
1 TABLET ORAL EVERY 4 HOURS PRN
Status: DISCONTINUED | OUTPATIENT
Start: 2018-08-17 | End: 2018-08-20

## 2018-08-17 RX ORDER — PANTOPRAZOLE SODIUM 20 MG/1
20 TABLET, DELAYED RELEASE ORAL DAILY
Status: DISCONTINUED | OUTPATIENT
Start: 2018-08-17 | End: 2018-08-21 | Stop reason: HOSPADM

## 2018-08-17 RX ORDER — SODIUM CHLORIDE 0.9 % (FLUSH) 0.9 %
10 SYRINGE (ML) INJECTION EVERY 12 HOURS SCHEDULED
Status: DISCONTINUED | OUTPATIENT
Start: 2018-08-17 | End: 2018-08-21 | Stop reason: HOSPADM

## 2018-08-17 RX ADMIN — TRAZODONE HYDROCHLORIDE 25 MG: 50 TABLET ORAL at 22:05

## 2018-08-17 RX ADMIN — IOPAMIDOL 110 ML: 755 INJECTION, SOLUTION INTRAVENOUS at 00:22

## 2018-08-17 RX ADMIN — Medication 10 ML: at 00:23

## 2018-08-17 RX ADMIN — Medication 10 ML: at 09:16

## 2018-08-17 RX ADMIN — OXYCODONE HYDROCHLORIDE AND ACETAMINOPHEN 1 TABLET: 5; 325 TABLET ORAL at 19:17

## 2018-08-17 RX ADMIN — Medication 10 ML: at 22:06

## 2018-08-17 RX ADMIN — PANTOPRAZOLE SODIUM 20 MG: 20 TABLET, DELAYED RELEASE ORAL at 09:15

## 2018-08-17 RX ADMIN — SODIUM CHLORIDE: 9 INJECTION, SOLUTION INTRAVENOUS at 05:15

## 2018-08-17 RX ADMIN — GABAPENTIN 100 MG: 100 CAPSULE ORAL at 09:15

## 2018-08-17 RX ADMIN — METHOCARBAMOL 500 MG: 500 TABLET ORAL at 22:05

## 2018-08-17 RX ADMIN — DOCUSATE SODIUM 100 MG: 100 CAPSULE, LIQUID FILLED ORAL at 22:05

## 2018-08-17 RX ADMIN — METHOCARBAMOL 500 MG: 500 TABLET ORAL at 14:06

## 2018-08-17 RX ADMIN — LOSARTAN POTASSIUM 100 MG: 50 TABLET, FILM COATED ORAL at 09:15

## 2018-08-17 RX ADMIN — OXYCODONE HYDROCHLORIDE AND ACETAMINOPHEN 2 TABLET: 5; 325 TABLET ORAL at 05:21

## 2018-08-17 RX ADMIN — GABAPENTIN 100 MG: 100 CAPSULE ORAL at 22:06

## 2018-08-17 RX ADMIN — OXYCODONE HYDROCHLORIDE AND ACETAMINOPHEN 2 TABLET: 5; 325 TABLET ORAL at 14:06

## 2018-08-17 RX ADMIN — GABAPENTIN 100 MG: 100 CAPSULE ORAL at 14:06

## 2018-08-17 RX ADMIN — METHOCARBAMOL 500 MG: 500 TABLET ORAL at 09:15

## 2018-08-17 RX ADMIN — MELATONIN 3 MG ORAL TABLET 9 MG: 3 TABLET ORAL at 22:05

## 2018-08-17 RX ADMIN — DOCUSATE SODIUM 100 MG: 100 CAPSULE, LIQUID FILLED ORAL at 09:15

## 2018-08-17 ASSESSMENT — PAIN SCALES - GENERAL
PAINLEVEL_OUTOF10: 10
PAINLEVEL_OUTOF10: 7
PAINLEVEL_OUTOF10: 0
PAINLEVEL_OUTOF10: 8

## 2018-08-17 ASSESSMENT — PAIN DESCRIPTION - ORIENTATION
ORIENTATION: MID
ORIENTATION: MID

## 2018-08-17 ASSESSMENT — PAIN DESCRIPTION - PAIN TYPE
TYPE: ACUTE PAIN
TYPE: ACUTE PAIN

## 2018-08-17 ASSESSMENT — PAIN DESCRIPTION - LOCATION
LOCATION: CHEST
LOCATION: CHEST

## 2018-08-17 ASSESSMENT — PAIN DESCRIPTION - FREQUENCY: FREQUENCY: CONTINUOUS

## 2018-08-17 ASSESSMENT — PAIN DESCRIPTION - DESCRIPTORS: DESCRIPTORS: OTHER (COMMENT)

## 2018-08-18 LAB
ALBUMIN SERPL-MCNC: 3.1 G/DL (ref 3.5–5.2)
ALP BLD-CCNC: 64 U/L (ref 35–104)
ALT SERPL-CCNC: 14 U/L (ref 0–32)
ANION GAP SERPL CALCULATED.3IONS-SCNC: 11 MMOL/L (ref 7–16)
AST SERPL-CCNC: 18 U/L (ref 0–31)
BASOPHILS ABSOLUTE: 0.04 E9/L (ref 0–0.2)
BASOPHILS RELATIVE PERCENT: 0.7 % (ref 0–2)
BILIRUB SERPL-MCNC: 0.6 MG/DL (ref 0–1.2)
BUN BLDV-MCNC: 14 MG/DL (ref 8–23)
CALCIUM SERPL-MCNC: 8.1 MG/DL (ref 8.6–10.2)
CHLORIDE BLD-SCNC: 105 MMOL/L (ref 98–107)
CO2: 26 MMOL/L (ref 22–29)
CREAT SERPL-MCNC: 0.9 MG/DL (ref 0.5–1)
EOSINOPHILS ABSOLUTE: 0.21 E9/L (ref 0.05–0.5)
EOSINOPHILS RELATIVE PERCENT: 3.9 % (ref 0–6)
GFR AFRICAN AMERICAN: >60
GFR NON-AFRICAN AMERICAN: 59 ML/MIN/1.73
GLUCOSE BLD-MCNC: 100 MG/DL (ref 74–109)
HCT VFR BLD CALC: 32.7 % (ref 34–48)
HEMOGLOBIN: 10.4 G/DL (ref 11.5–15.5)
IMMATURE GRANULOCYTES #: 0.03 E9/L
IMMATURE GRANULOCYTES %: 0.6 % (ref 0–5)
LYMPHOCYTES ABSOLUTE: 0.99 E9/L (ref 1.5–4)
LYMPHOCYTES RELATIVE PERCENT: 18.3 % (ref 20–42)
MCH RBC QN AUTO: 30.8 PG (ref 26–35)
MCHC RBC AUTO-ENTMCNC: 31.8 % (ref 32–34.5)
MCV RBC AUTO: 96.7 FL (ref 80–99.9)
MONOCYTES ABSOLUTE: 0.57 E9/L (ref 0.1–0.95)
MONOCYTES RELATIVE PERCENT: 10.5 % (ref 2–12)
NEUTROPHILS ABSOLUTE: 3.57 E9/L (ref 1.8–7.3)
NEUTROPHILS RELATIVE PERCENT: 66 % (ref 43–80)
PDW BLD-RTO: 14.1 FL (ref 11.5–15)
PLATELET # BLD: 115 E9/L (ref 130–450)
PMV BLD AUTO: 10.4 FL (ref 7–12)
POTASSIUM REFLEX MAGNESIUM: 3.8 MMOL/L (ref 3.5–5)
RBC # BLD: 3.38 E12/L (ref 3.5–5.5)
SODIUM BLD-SCNC: 142 MMOL/L (ref 132–146)
TOTAL PROTEIN: 4.9 G/DL (ref 6.4–8.3)
WBC # BLD: 5.4 E9/L (ref 4.5–11.5)

## 2018-08-18 PROCEDURE — 6370000000 HC RX 637 (ALT 250 FOR IP): Performed by: STUDENT IN AN ORGANIZED HEALTH CARE EDUCATION/TRAINING PROGRAM

## 2018-08-18 PROCEDURE — 2580000003 HC RX 258: Performed by: STUDENT IN AN ORGANIZED HEALTH CARE EDUCATION/TRAINING PROGRAM

## 2018-08-18 PROCEDURE — 80053 COMPREHEN METABOLIC PANEL: CPT

## 2018-08-18 PROCEDURE — 6360000002 HC RX W HCPCS: Performed by: STUDENT IN AN ORGANIZED HEALTH CARE EDUCATION/TRAINING PROGRAM

## 2018-08-18 PROCEDURE — 85025 COMPLETE CBC W/AUTO DIFF WBC: CPT

## 2018-08-18 PROCEDURE — 99233 SBSQ HOSP IP/OBS HIGH 50: CPT | Performed by: SURGERY

## 2018-08-18 PROCEDURE — 36415 COLL VENOUS BLD VENIPUNCTURE: CPT

## 2018-08-18 PROCEDURE — 2060000000 HC ICU INTERMEDIATE R&B

## 2018-08-18 RX ORDER — HEPARIN SODIUM 10000 [USP'U]/ML
5000 INJECTION, SOLUTION INTRAVENOUS; SUBCUTANEOUS EVERY 8 HOURS SCHEDULED
Status: DISCONTINUED | OUTPATIENT
Start: 2018-08-18 | End: 2018-08-21 | Stop reason: HOSPADM

## 2018-08-18 RX ADMIN — DOCUSATE SODIUM 100 MG: 100 CAPSULE, LIQUID FILLED ORAL at 08:37

## 2018-08-18 RX ADMIN — HEPARIN SODIUM 5000 UNITS: 10000 INJECTION INTRAVENOUS; SUBCUTANEOUS at 21:03

## 2018-08-18 RX ADMIN — PANTOPRAZOLE SODIUM 20 MG: 20 TABLET, DELAYED RELEASE ORAL at 08:36

## 2018-08-18 RX ADMIN — METHOCARBAMOL 500 MG: 500 TABLET ORAL at 21:01

## 2018-08-18 RX ADMIN — HEPARIN SODIUM 5000 UNITS: 10000 INJECTION INTRAVENOUS; SUBCUTANEOUS at 08:36

## 2018-08-18 RX ADMIN — GABAPENTIN 100 MG: 100 CAPSULE ORAL at 08:36

## 2018-08-18 RX ADMIN — OXYCODONE HYDROCHLORIDE AND ACETAMINOPHEN 1 TABLET: 5; 325 TABLET ORAL at 15:58

## 2018-08-18 RX ADMIN — LOSARTAN POTASSIUM 100 MG: 50 TABLET, FILM COATED ORAL at 08:37

## 2018-08-18 RX ADMIN — METHOCARBAMOL 500 MG: 500 TABLET ORAL at 08:36

## 2018-08-18 RX ADMIN — GABAPENTIN 100 MG: 100 CAPSULE ORAL at 14:23

## 2018-08-18 RX ADMIN — SODIUM CHLORIDE: 9 INJECTION, SOLUTION INTRAVENOUS at 21:00

## 2018-08-18 RX ADMIN — DOCUSATE SODIUM 100 MG: 100 CAPSULE, LIQUID FILLED ORAL at 21:00

## 2018-08-18 RX ADMIN — SODIUM CHLORIDE: 9 INJECTION, SOLUTION INTRAVENOUS at 07:18

## 2018-08-18 RX ADMIN — HEPARIN SODIUM 5000 UNITS: 10000 INJECTION INTRAVENOUS; SUBCUTANEOUS at 14:23

## 2018-08-18 RX ADMIN — GABAPENTIN 100 MG: 100 CAPSULE ORAL at 21:01

## 2018-08-18 RX ADMIN — MELATONIN 3 MG ORAL TABLET 9 MG: 3 TABLET ORAL at 21:00

## 2018-08-18 RX ADMIN — OXYCODONE HYDROCHLORIDE AND ACETAMINOPHEN 1 TABLET: 5; 325 TABLET ORAL at 11:32

## 2018-08-18 RX ADMIN — METHOCARBAMOL 500 MG: 500 TABLET ORAL at 17:11

## 2018-08-18 RX ADMIN — TRAZODONE HYDROCHLORIDE 25 MG: 50 TABLET ORAL at 21:00

## 2018-08-18 RX ADMIN — OXYCODONE HYDROCHLORIDE AND ACETAMINOPHEN 1 TABLET: 5; 325 TABLET ORAL at 08:40

## 2018-08-18 RX ADMIN — METHOCARBAMOL 500 MG: 500 TABLET ORAL at 14:23

## 2018-08-18 ASSESSMENT — PAIN SCALES - GENERAL
PAINLEVEL_OUTOF10: 8
PAINLEVEL_OUTOF10: 5
PAINLEVEL_OUTOF10: 0
PAINLEVEL_OUTOF10: 7
PAINLEVEL_OUTOF10: 10
PAINLEVEL_OUTOF10: 10
PAINLEVEL_OUTOF10: 8

## 2018-08-18 ASSESSMENT — PAIN DESCRIPTION - LOCATION
LOCATION: BACK;CHEST;SHOULDER
LOCATION: BACK;CHEST;SHOULDER
LOCATION: BACK;CHEST
LOCATION: BACK;CHEST

## 2018-08-18 ASSESSMENT — PAIN DESCRIPTION - PAIN TYPE
TYPE: ACUTE PAIN

## 2018-08-18 ASSESSMENT — PAIN DESCRIPTION - FREQUENCY
FREQUENCY: CONTINUOUS

## 2018-08-18 ASSESSMENT — PAIN DESCRIPTION - DESCRIPTORS
DESCRIPTORS: CONSTANT;DISCOMFORT
DESCRIPTORS: CONSTANT;DISCOMFORT
DESCRIPTORS: DISCOMFORT

## 2018-08-19 LAB
ALBUMIN SERPL-MCNC: 3 G/DL (ref 3.5–5.2)
ALP BLD-CCNC: 67 U/L (ref 35–104)
ALT SERPL-CCNC: 13 U/L (ref 0–32)
ANION GAP SERPL CALCULATED.3IONS-SCNC: 10 MMOL/L (ref 7–16)
AST SERPL-CCNC: 16 U/L (ref 0–31)
BASOPHILS ABSOLUTE: 0.04 E9/L (ref 0–0.2)
BASOPHILS RELATIVE PERCENT: 0.9 % (ref 0–2)
BILIRUB SERPL-MCNC: 0.4 MG/DL (ref 0–1.2)
BUN BLDV-MCNC: 14 MG/DL (ref 8–23)
CALCIUM SERPL-MCNC: 8 MG/DL (ref 8.6–10.2)
CHLORIDE BLD-SCNC: 108 MMOL/L (ref 98–107)
CO2: 26 MMOL/L (ref 22–29)
CREAT SERPL-MCNC: 0.8 MG/DL (ref 0.5–1)
EOSINOPHILS ABSOLUTE: 0.21 E9/L (ref 0.05–0.5)
EOSINOPHILS RELATIVE PERCENT: 4.6 % (ref 0–6)
GFR AFRICAN AMERICAN: >60
GFR NON-AFRICAN AMERICAN: >60 ML/MIN/1.73
GLUCOSE BLD-MCNC: 95 MG/DL (ref 74–109)
HCT VFR BLD CALC: 31.8 % (ref 34–48)
HEMOGLOBIN: 10.5 G/DL (ref 11.5–15.5)
IMMATURE GRANULOCYTES #: 0.01 E9/L
IMMATURE GRANULOCYTES %: 0.2 % (ref 0–5)
LYMPHOCYTES ABSOLUTE: 1.01 E9/L (ref 1.5–4)
LYMPHOCYTES RELATIVE PERCENT: 22.1 % (ref 20–42)
MCH RBC QN AUTO: 31.9 PG (ref 26–35)
MCHC RBC AUTO-ENTMCNC: 33 % (ref 32–34.5)
MCV RBC AUTO: 96.7 FL (ref 80–99.9)
MONOCYTES ABSOLUTE: 0.39 E9/L (ref 0.1–0.95)
MONOCYTES RELATIVE PERCENT: 8.5 % (ref 2–12)
NEUTROPHILS ABSOLUTE: 2.92 E9/L (ref 1.8–7.3)
NEUTROPHILS RELATIVE PERCENT: 63.7 % (ref 43–80)
PDW BLD-RTO: 14.3 FL (ref 11.5–15)
PLATELET # BLD: 99 E9/L (ref 130–450)
PLATELET CONFIRMATION: NORMAL
PMV BLD AUTO: 10.5 FL (ref 7–12)
POTASSIUM REFLEX MAGNESIUM: 4.2 MMOL/L (ref 3.5–5)
RBC # BLD: 3.29 E12/L (ref 3.5–5.5)
SODIUM BLD-SCNC: 144 MMOL/L (ref 132–146)
TOTAL PROTEIN: 5 G/DL (ref 6.4–8.3)
WBC # BLD: 4.6 E9/L (ref 4.5–11.5)

## 2018-08-19 PROCEDURE — 97535 SELF CARE MNGMENT TRAINING: CPT

## 2018-08-19 PROCEDURE — 36415 COLL VENOUS BLD VENIPUNCTURE: CPT

## 2018-08-19 PROCEDURE — 1200000000 HC SEMI PRIVATE

## 2018-08-19 PROCEDURE — 6360000002 HC RX W HCPCS: Performed by: STUDENT IN AN ORGANIZED HEALTH CARE EDUCATION/TRAINING PROGRAM

## 2018-08-19 PROCEDURE — 80053 COMPREHEN METABOLIC PANEL: CPT

## 2018-08-19 PROCEDURE — 97530 THERAPEUTIC ACTIVITIES: CPT

## 2018-08-19 PROCEDURE — 2580000003 HC RX 258: Performed by: STUDENT IN AN ORGANIZED HEALTH CARE EDUCATION/TRAINING PROGRAM

## 2018-08-19 PROCEDURE — 99233 SBSQ HOSP IP/OBS HIGH 50: CPT | Performed by: SURGERY

## 2018-08-19 PROCEDURE — 6370000000 HC RX 637 (ALT 250 FOR IP): Performed by: STUDENT IN AN ORGANIZED HEALTH CARE EDUCATION/TRAINING PROGRAM

## 2018-08-19 PROCEDURE — 85025 COMPLETE CBC W/AUTO DIFF WBC: CPT

## 2018-08-19 RX ORDER — MORPHINE SULFATE 2 MG/ML
1 INJECTION, SOLUTION INTRAMUSCULAR; INTRAVENOUS EVERY 4 HOURS PRN
Status: DISCONTINUED | OUTPATIENT
Start: 2018-08-19 | End: 2018-08-20

## 2018-08-19 RX ORDER — VITS A,C,E/LUTEIN/MINERALS 300MCG-200
1 TABLET ORAL DAILY
Status: DISCONTINUED | OUTPATIENT
Start: 2018-08-19 | End: 2018-08-21 | Stop reason: HOSPADM

## 2018-08-19 RX ADMIN — HEPARIN SODIUM 5000 UNITS: 10000 INJECTION INTRAVENOUS; SUBCUTANEOUS at 05:35

## 2018-08-19 RX ADMIN — GABAPENTIN 100 MG: 100 CAPSULE ORAL at 10:13

## 2018-08-19 RX ADMIN — DOCUSATE SODIUM 100 MG: 100 CAPSULE, LIQUID FILLED ORAL at 10:13

## 2018-08-19 RX ADMIN — DOCUSATE SODIUM 100 MG: 100 CAPSULE, LIQUID FILLED ORAL at 20:04

## 2018-08-19 RX ADMIN — PANTOPRAZOLE SODIUM 20 MG: 20 TABLET, DELAYED RELEASE ORAL at 11:29

## 2018-08-19 RX ADMIN — METHOCARBAMOL 500 MG: 500 TABLET ORAL at 08:16

## 2018-08-19 RX ADMIN — GABAPENTIN 100 MG: 100 CAPSULE ORAL at 20:05

## 2018-08-19 RX ADMIN — OXYCODONE HYDROCHLORIDE AND ACETAMINOPHEN 1 TABLET: 5; 325 TABLET ORAL at 05:38

## 2018-08-19 RX ADMIN — OXYCODONE HYDROCHLORIDE AND ACETAMINOPHEN 1 TABLET: 5; 325 TABLET ORAL at 13:46

## 2018-08-19 RX ADMIN — MELATONIN 3 MG ORAL TABLET 9 MG: 3 TABLET ORAL at 20:05

## 2018-08-19 RX ADMIN — HEPARIN SODIUM 5000 UNITS: 10000 INJECTION INTRAVENOUS; SUBCUTANEOUS at 20:08

## 2018-08-19 RX ADMIN — METHOCARBAMOL 500 MG: 500 TABLET ORAL at 17:21

## 2018-08-19 RX ADMIN — Medication 10 ML: at 10:14

## 2018-08-19 RX ADMIN — GABAPENTIN 100 MG: 100 CAPSULE ORAL at 13:47

## 2018-08-19 RX ADMIN — TRAZODONE HYDROCHLORIDE 25 MG: 50 TABLET ORAL at 20:06

## 2018-08-19 RX ADMIN — HEPARIN SODIUM 5000 UNITS: 10000 INJECTION INTRAVENOUS; SUBCUTANEOUS at 13:47

## 2018-08-19 RX ADMIN — SODIUM CHLORIDE: 9 INJECTION, SOLUTION INTRAVENOUS at 11:31

## 2018-08-19 RX ADMIN — Medication 10 ML: at 20:06

## 2018-08-19 RX ADMIN — METHOCARBAMOL 500 MG: 500 TABLET ORAL at 12:33

## 2018-08-19 RX ADMIN — LOSARTAN POTASSIUM 100 MG: 50 TABLET, FILM COATED ORAL at 10:13

## 2018-08-19 RX ADMIN — METHOCARBAMOL 500 MG: 500 TABLET ORAL at 20:05

## 2018-08-19 ASSESSMENT — PAIN DESCRIPTION - DESCRIPTORS
DESCRIPTORS: ACHING;DISCOMFORT
DESCRIPTORS: CONSTANT

## 2018-08-19 ASSESSMENT — PAIN DESCRIPTION - LOCATION
LOCATION: CHEST
LOCATION: CHEST;STERNUM
LOCATION: CHEST

## 2018-08-19 ASSESSMENT — PAIN SCALES - GENERAL
PAINLEVEL_OUTOF10: 8
PAINLEVEL_OUTOF10: 8
PAINLEVEL_OUTOF10: 7
PAINLEVEL_OUTOF10: 6

## 2018-08-19 ASSESSMENT — PAIN DESCRIPTION - PAIN TYPE
TYPE: ACUTE PAIN

## 2018-08-19 ASSESSMENT — PAIN DESCRIPTION - FREQUENCY
FREQUENCY: CONTINUOUS
FREQUENCY: CONTINUOUS

## 2018-08-20 ENCOUNTER — TELEPHONE (OUTPATIENT)
Dept: PRIMARY CARE CLINIC | Age: 83
End: 2018-08-20

## 2018-08-20 PROCEDURE — 1200000000 HC SEMI PRIVATE

## 2018-08-20 PROCEDURE — 6360000002 HC RX W HCPCS: Performed by: STUDENT IN AN ORGANIZED HEALTH CARE EDUCATION/TRAINING PROGRAM

## 2018-08-20 PROCEDURE — 2580000003 HC RX 258: Performed by: STUDENT IN AN ORGANIZED HEALTH CARE EDUCATION/TRAINING PROGRAM

## 2018-08-20 PROCEDURE — 6370000000 HC RX 637 (ALT 250 FOR IP): Performed by: SURGERY

## 2018-08-20 PROCEDURE — 99233 SBSQ HOSP IP/OBS HIGH 50: CPT | Performed by: SURGERY

## 2018-08-20 PROCEDURE — 97530 THERAPEUTIC ACTIVITIES: CPT

## 2018-08-20 PROCEDURE — 6370000000 HC RX 637 (ALT 250 FOR IP): Performed by: STUDENT IN AN ORGANIZED HEALTH CARE EDUCATION/TRAINING PROGRAM

## 2018-08-20 RX ORDER — ACETAMINOPHEN 325 MG/1
650 TABLET ORAL EVERY 6 HOURS
Status: DISCONTINUED | OUTPATIENT
Start: 2018-08-20 | End: 2018-08-21 | Stop reason: HOSPADM

## 2018-08-20 RX ORDER — OXYCODONE HYDROCHLORIDE 5 MG/1
5 TABLET ORAL EVERY 6 HOURS PRN
Status: DISCONTINUED | OUTPATIENT
Start: 2018-08-20 | End: 2018-08-21 | Stop reason: HOSPADM

## 2018-08-20 RX ADMIN — TRAZODONE HYDROCHLORIDE 25 MG: 50 TABLET ORAL at 21:11

## 2018-08-20 RX ADMIN — OXYCODONE HYDROCHLORIDE AND ACETAMINOPHEN 1 TABLET: 5; 325 TABLET ORAL at 13:35

## 2018-08-20 RX ADMIN — Medication 10 ML: at 09:02

## 2018-08-20 RX ADMIN — DOCUSATE SODIUM 100 MG: 100 CAPSULE, LIQUID FILLED ORAL at 09:07

## 2018-08-20 RX ADMIN — GABAPENTIN 100 MG: 100 CAPSULE ORAL at 21:11

## 2018-08-20 RX ADMIN — DOCUSATE SODIUM 100 MG: 100 CAPSULE, LIQUID FILLED ORAL at 21:12

## 2018-08-20 RX ADMIN — ACETAMINOPHEN 650 MG: 325 TABLET, FILM COATED ORAL at 16:48

## 2018-08-20 RX ADMIN — LOSARTAN POTASSIUM 100 MG: 50 TABLET, FILM COATED ORAL at 09:02

## 2018-08-20 RX ADMIN — METHOCARBAMOL 500 MG: 500 TABLET ORAL at 09:01

## 2018-08-20 RX ADMIN — Medication 1 TABLET: at 09:02

## 2018-08-20 RX ADMIN — METHOCARBAMOL 500 MG: 500 TABLET ORAL at 21:11

## 2018-08-20 RX ADMIN — MELATONIN 3 MG ORAL TABLET 9 MG: 3 TABLET ORAL at 21:12

## 2018-08-20 RX ADMIN — HEPARIN SODIUM 5000 UNITS: 10000 INJECTION INTRAVENOUS; SUBCUTANEOUS at 14:43

## 2018-08-20 RX ADMIN — PANTOPRAZOLE SODIUM 20 MG: 20 TABLET, DELAYED RELEASE ORAL at 09:01

## 2018-08-20 RX ADMIN — GABAPENTIN 100 MG: 100 CAPSULE ORAL at 14:42

## 2018-08-20 RX ADMIN — METHOCARBAMOL 500 MG: 500 TABLET ORAL at 14:42

## 2018-08-20 RX ADMIN — HEPARIN SODIUM 5000 UNITS: 10000 INJECTION INTRAVENOUS; SUBCUTANEOUS at 06:33

## 2018-08-20 RX ADMIN — HEPARIN SODIUM 5000 UNITS: 10000 INJECTION INTRAVENOUS; SUBCUTANEOUS at 21:21

## 2018-08-20 RX ADMIN — GABAPENTIN 100 MG: 100 CAPSULE ORAL at 09:02

## 2018-08-20 RX ADMIN — METHOCARBAMOL 500 MG: 500 TABLET ORAL at 18:24

## 2018-08-20 RX ADMIN — Medication 10 ML: at 21:12

## 2018-08-20 ASSESSMENT — PAIN SCALES - GENERAL
PAINLEVEL_OUTOF10: 0
PAINLEVEL_OUTOF10: 6
PAINLEVEL_OUTOF10: 7
PAINLEVEL_OUTOF10: 6
PAINLEVEL_OUTOF10: 0

## 2018-08-21 VITALS
TEMPERATURE: 98.9 F | DIASTOLIC BLOOD PRESSURE: 68 MMHG | BODY MASS INDEX: 24.54 KG/M2 | HEART RATE: 85 BPM | RESPIRATION RATE: 16 BRPM | HEIGHT: 60 IN | SYSTOLIC BLOOD PRESSURE: 149 MMHG | OXYGEN SATURATION: 95 % | WEIGHT: 125 LBS
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
## 2018-08-21 PROCEDURE — 97535 SELF CARE MNGMENT TRAINING: CPT

## 2018-08-21 PROCEDURE — 97530 THERAPEUTIC ACTIVITIES: CPT

## 2018-08-21 PROCEDURE — 6370000000 HC RX 637 (ALT 250 FOR IP): Performed by: SURGERY

## 2018-08-21 PROCEDURE — 97110 THERAPEUTIC EXERCISES: CPT

## 2018-08-21 PROCEDURE — 6360000002 HC RX W HCPCS: Performed by: STUDENT IN AN ORGANIZED HEALTH CARE EDUCATION/TRAINING PROGRAM

## 2018-08-21 PROCEDURE — 2580000003 HC RX 258: Performed by: STUDENT IN AN ORGANIZED HEALTH CARE EDUCATION/TRAINING PROGRAM

## 2018-08-21 PROCEDURE — 6370000000 HC RX 637 (ALT 250 FOR IP): Performed by: STUDENT IN AN ORGANIZED HEALTH CARE EDUCATION/TRAINING PROGRAM

## 2018-08-21 RX ORDER — ACETAMINOPHEN 325 MG/1
650 TABLET ORAL EVERY 6 HOURS
Qty: 120 TABLET | Refills: 3 | DISCHARGE
Start: 2018-08-21 | End: 2020-01-01

## 2018-08-21 RX ADMIN — GABAPENTIN 100 MG: 100 CAPSULE ORAL at 09:28

## 2018-08-21 RX ADMIN — ACETAMINOPHEN 650 MG: 325 TABLET, FILM COATED ORAL at 09:28

## 2018-08-21 RX ADMIN — METHOCARBAMOL 500 MG: 500 TABLET ORAL at 09:27

## 2018-08-21 RX ADMIN — PANTOPRAZOLE SODIUM 20 MG: 20 TABLET, DELAYED RELEASE ORAL at 09:28

## 2018-08-21 RX ADMIN — LOSARTAN POTASSIUM 100 MG: 50 TABLET, FILM COATED ORAL at 09:27

## 2018-08-21 RX ADMIN — HEPARIN SODIUM 5000 UNITS: 10000 INJECTION INTRAVENOUS; SUBCUTANEOUS at 06:11

## 2018-08-21 RX ADMIN — ACETAMINOPHEN 650 MG: 325 TABLET, FILM COATED ORAL at 06:11

## 2018-08-21 RX ADMIN — DOCUSATE SODIUM 100 MG: 100 CAPSULE, LIQUID FILLED ORAL at 09:28

## 2018-08-21 RX ADMIN — Medication 10 ML: at 09:28

## 2018-08-21 RX ADMIN — Medication 1 TABLET: at 09:27

## 2018-08-21 ASSESSMENT — PAIN DESCRIPTION - ORIENTATION: ORIENTATION: MID

## 2018-08-21 ASSESSMENT — PAIN DESCRIPTION - FREQUENCY: FREQUENCY: CONTINUOUS

## 2018-08-21 ASSESSMENT — PAIN DESCRIPTION - PAIN TYPE: TYPE: ACUTE PAIN

## 2018-08-21 ASSESSMENT — PAIN SCALES - GENERAL
PAINLEVEL_OUTOF10: 0
PAINLEVEL_OUTOF10: 7
PAINLEVEL_OUTOF10: 4
PAINLEVEL_OUTOF10: 5
PAINLEVEL_OUTOF10: 7

## 2018-08-21 ASSESSMENT — PAIN DESCRIPTION - LOCATION: LOCATION: CHEST

## 2018-08-21 ASSESSMENT — PAIN DESCRIPTION - ONSET: ONSET: ON-GOING

## 2018-08-21 ASSESSMENT — PAIN DESCRIPTION - PROGRESSION: CLINICAL_PROGRESSION: NOT CHANGED

## 2018-08-21 ASSESSMENT — PAIN DESCRIPTION - DESCRIPTORS: DESCRIPTORS: ACHING;DISCOMFORT

## 2018-08-23 ENCOUNTER — TELEPHONE (OUTPATIENT)
Dept: SURGERY | Age: 83
End: 2018-08-23

## 2018-08-23 NOTE — TELEPHONE ENCOUNTER
MA received another phone call from Sonia Thomas at [facility] stating the family the appointment on a Friday. MA rescheduled to first available Friday 9/14/18 at 1:00PM.  Sonia Thomas verbalized understanding.     Electronically signed by Krissy Nicole on 8/23/18 at 9:52 AM

## 2018-09-12 PROBLEM — Z12.31 ENCOUNTER FOR SCREENING MAMMOGRAM FOR MALIGNANT NEOPLASM OF BREAST: Status: RESOLVED | Noted: 2018-08-13 | Resolved: 2018-09-12

## 2018-09-14 ENCOUNTER — OFFICE VISIT (OUTPATIENT)
Dept: SURGERY | Age: 83
End: 2018-09-14
Payer: MEDICARE

## 2018-09-14 VITALS
HEART RATE: 69 BPM | BODY MASS INDEX: 25.13 KG/M2 | TEMPERATURE: 98 F | WEIGHT: 128 LBS | OXYGEN SATURATION: 96 % | DIASTOLIC BLOOD PRESSURE: 65 MMHG | SYSTOLIC BLOOD PRESSURE: 143 MMHG | RESPIRATION RATE: 16 BRPM | HEIGHT: 60 IN

## 2018-09-14 DIAGNOSIS — Z09 FOLLOW UP: Primary | ICD-10-CM

## 2018-09-14 DIAGNOSIS — S22.20XD CLOSED FRACTURE OF STERNUM WITH ROUTINE HEALING, UNSPECIFIED PORTION OF STERNUM, SUBSEQUENT ENCOUNTER: ICD-10-CM

## 2018-09-14 DIAGNOSIS — S39.91XD BLUNT TRAUMA TO ABDOMEN, SUBSEQUENT ENCOUNTER: ICD-10-CM

## 2018-09-14 PROCEDURE — G8427 DOCREV CUR MEDS BY ELIG CLIN: HCPCS | Performed by: NURSE PRACTITIONER

## 2018-09-14 PROCEDURE — G8399 PT W/DXA RESULTS DOCUMENT: HCPCS | Performed by: NURSE PRACTITIONER

## 2018-09-14 PROCEDURE — 1101F PT FALLS ASSESS-DOCD LE1/YR: CPT | Performed by: NURSE PRACTITIONER

## 2018-09-14 PROCEDURE — 99212 OFFICE O/P EST SF 10 MIN: CPT | Performed by: NURSE PRACTITIONER

## 2018-09-14 PROCEDURE — 4040F PNEUMOC VAC/ADMIN/RCVD: CPT | Performed by: NURSE PRACTITIONER

## 2018-09-14 PROCEDURE — 1123F ACP DISCUSS/DSCN MKR DOCD: CPT | Performed by: NURSE PRACTITIONER

## 2018-09-14 PROCEDURE — G8419 CALC BMI OUT NRM PARAM NOF/U: HCPCS | Performed by: NURSE PRACTITIONER

## 2018-09-14 PROCEDURE — 1111F DSCHRG MED/CURRENT MED MERGE: CPT | Performed by: NURSE PRACTITIONER

## 2018-09-14 PROCEDURE — 1036F TOBACCO NON-USER: CPT | Performed by: NURSE PRACTITIONER

## 2018-09-14 PROCEDURE — 1090F PRES/ABSN URINE INCON ASSESS: CPT | Performed by: NURSE PRACTITIONER

## 2018-09-14 RX ORDER — LOSARTAN POTASSIUM 100 MG/1
100 TABLET ORAL DAILY
COMMUNITY
End: 2020-01-01

## 2018-09-14 RX ORDER — LIDOCAINE 50 MG/G
1 PATCH TOPICAL DAILY
COMMUNITY
End: 2020-01-19 | Stop reason: ALTCHOICE

## 2018-09-14 RX ORDER — GABAPENTIN 100 MG/1
100 CAPSULE ORAL 3 TIMES DAILY
COMMUNITY
End: 2018-09-28

## 2018-09-14 RX ORDER — OMEPRAZOLE 20 MG/1
20 CAPSULE, DELAYED RELEASE ORAL DAILY
COMMUNITY
End: 2018-09-28
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
## 2018-09-20 ENCOUNTER — TELEPHONE (OUTPATIENT)
Dept: PRIMARY CARE CLINIC | Age: 83
End: 2018-09-20

## 2018-09-20 NOTE — TELEPHONE ENCOUNTER
Spoke with Tarsha Moss at [facility], patient is currently taking Losartan 100 MG QD and Omeprazole 20 MG QD which she was on when discharged from rehab. She would like to resume Irbesartan 300 MG  QD and Pantoprazole 20 MG QD which she ws on before hospitalization. Please advise. Thank you.

## 2018-09-24 RX ORDER — PANTOPRAZOLE SODIUM 20 MG/1
TABLET, DELAYED RELEASE ORAL
Qty: 90 TABLET | Refills: 0 | Status: SHIPPED
Start: 2018-09-24 | End: 2020-01-01

## 2018-09-24 RX ORDER — TRAZODONE HYDROCHLORIDE 50 MG/1
TABLET ORAL
Qty: 15 TABLET | Refills: 0 | Status: SHIPPED | OUTPATIENT
Start: 2018-09-24 | End: 2018-12-16 | Stop reason: SDUPTHER

## 2018-09-28 ENCOUNTER — OFFICE VISIT (OUTPATIENT)
Dept: PRIMARY CARE CLINIC | Age: 83
End: 2018-09-28
Payer: MEDICARE

## 2018-09-28 VITALS
SYSTOLIC BLOOD PRESSURE: 138 MMHG | HEIGHT: 60 IN | WEIGHT: 126 LBS | BODY MASS INDEX: 24.74 KG/M2 | OXYGEN SATURATION: 98 % | DIASTOLIC BLOOD PRESSURE: 78 MMHG | HEART RATE: 85 BPM | TEMPERATURE: 98.8 F | RESPIRATION RATE: 16 BRPM

## 2018-09-28 DIAGNOSIS — S22.20XA CLOSED FRACTURE OF STERNUM, UNSPECIFIED PORTION OF STERNUM, INITIAL ENCOUNTER: Primary | ICD-10-CM

## 2018-09-28 DIAGNOSIS — I35.1 NONRHEUMATIC AORTIC VALVE INSUFFICIENCY: Chronic | ICD-10-CM

## 2018-09-28 DIAGNOSIS — E78.2 MIXED HYPERLIPIDEMIA: ICD-10-CM

## 2018-09-28 DIAGNOSIS — R53.83 FATIGUE, UNSPECIFIED TYPE: ICD-10-CM

## 2018-09-28 DIAGNOSIS — M51.9 LUMBAR DISC DISEASE: ICD-10-CM

## 2018-09-28 DIAGNOSIS — S29.9XXA CHEST WALL TRAUMA: ICD-10-CM

## 2018-09-28 DIAGNOSIS — R13.19 ESOPHAGEAL DYSPHAGIA: ICD-10-CM

## 2018-09-28 DIAGNOSIS — I34.0 NON-RHEUMATIC MITRAL REGURGITATION: Chronic | ICD-10-CM

## 2018-09-28 PROCEDURE — 1036F TOBACCO NON-USER: CPT | Performed by: INTERNAL MEDICINE

## 2018-09-28 PROCEDURE — G8427 DOCREV CUR MEDS BY ELIG CLIN: HCPCS | Performed by: INTERNAL MEDICINE

## 2018-09-28 PROCEDURE — 1090F PRES/ABSN URINE INCON ASSESS: CPT | Performed by: INTERNAL MEDICINE

## 2018-09-28 PROCEDURE — 4040F PNEUMOC VAC/ADMIN/RCVD: CPT | Performed by: INTERNAL MEDICINE

## 2018-09-28 PROCEDURE — 99214 OFFICE O/P EST MOD 30 MIN: CPT | Performed by: INTERNAL MEDICINE

## 2018-09-28 PROCEDURE — G8399 PT W/DXA RESULTS DOCUMENT: HCPCS | Performed by: INTERNAL MEDICINE

## 2018-09-28 PROCEDURE — G8420 CALC BMI NORM PARAMETERS: HCPCS | Performed by: INTERNAL MEDICINE

## 2018-09-28 PROCEDURE — 1101F PT FALLS ASSESS-DOCD LE1/YR: CPT | Performed by: INTERNAL MEDICINE

## 2018-09-28 PROCEDURE — 1123F ACP DISCUSS/DSCN MKR DOCD: CPT | Performed by: INTERNAL MEDICINE

## 2018-09-28 ASSESSMENT — ENCOUNTER SYMPTOMS
DIARRHEA: 0
DOUBLE VISION: 0
HEMOPTYSIS: 0
SHORTNESS OF BREATH: 0
BLURRED VISION: 0
BLOOD IN STOOL: 0
EYE PAIN: 0
BACK PAIN: 1
STRIDOR: 0
NAUSEA: 0
EYE REDNESS: 0
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
## 2018-10-01 ENCOUNTER — TELEPHONE (OUTPATIENT)
Dept: PRIMARY CARE CLINIC | Age: 83
End: 2018-10-01

## 2018-10-01 DIAGNOSIS — L24.4 IRRITANT CONTACT DERMATITIS DUE TO DRUG IN CONTACT WITH SKIN: Primary | ICD-10-CM

## 2018-10-01 RX ORDER — TRIAMCINOLONE ACETONIDE 0.25 MG/G
CREAM TOPICAL
Qty: 80 G | Refills: 0 | Status: SHIPPED
Start: 2018-10-01 | End: 2020-01-01

## 2018-10-01 NOTE — TELEPHONE ENCOUNTER
Patient complaining of rash where she was using Lidoderm patches to the skin.     Kenalog cream to be applied b.i.d.    80 g tube sent to pharmacy on record

## 2018-10-15 ENCOUNTER — TELEPHONE (OUTPATIENT)
Dept: PRIMARY CARE CLINIC | Age: 83
End: 2018-10-15

## 2018-10-15 NOTE — TELEPHONE ENCOUNTER
William Nicole at Dr Amie De La Paz office to contact Trauma Service at [address] [place] [address] regarding steroid injection.

## 2018-11-13 ENCOUNTER — HOSPITAL ENCOUNTER (OUTPATIENT)
Age: 83
Discharge: HOME | End: 2018-11-15

## 2018-11-13 PROCEDURE — 87081 CULTURE SCREEN ONLY: CPT

## 2018-11-13 PROCEDURE — 88305 TISSUE EXAM BY PATHOLOGIST: CPT

## 2018-11-13 PROCEDURE — 88342 IMHCHEM/IMCYTCHM 1ST ANTB: CPT

## 2018-11-14 LAB — CLOTEST: NORMAL

## 2018-11-28 ENCOUNTER — OFFICE VISIT (OUTPATIENT)
Dept: VASCULAR SURGERY | Age: 83
End: 2018-11-28
Payer: MEDICARE

## 2018-11-28 DIAGNOSIS — I83.811 VARICOSE VEINS OF RIGHT LOWER EXTREMITY WITH PAIN: ICD-10-CM

## 2018-11-28 DIAGNOSIS — I65.21 CAROTID STENOSIS, RIGHT: ICD-10-CM

## 2018-11-28 DIAGNOSIS — Z87.39 H/O BURNING PAIN IN LEG: ICD-10-CM

## 2018-11-28 DIAGNOSIS — I89.0 LYMPHEDEMA OF BOTH LOWER EXTREMITIES: Primary | Chronic | ICD-10-CM

## 2018-11-28 DIAGNOSIS — R09.89 BILATERAL CAROTID BRUITS: ICD-10-CM

## 2018-11-28 DIAGNOSIS — I78.1 SPIDER VEINS: ICD-10-CM

## 2018-11-28 DIAGNOSIS — M79.89 LEG SWELLING: ICD-10-CM

## 2018-11-28 PROCEDURE — G8599 NO ASA/ANTIPLAT THER USE RNG: HCPCS | Performed by: SURGERY

## 2018-11-28 PROCEDURE — 1123F ACP DISCUSS/DSCN MKR DOCD: CPT | Performed by: SURGERY

## 2018-11-28 PROCEDURE — G8399 PT W/DXA RESULTS DOCUMENT: HCPCS | Performed by: SURGERY

## 2018-11-28 PROCEDURE — G8484 FLU IMMUNIZE NO ADMIN: HCPCS | Performed by: SURGERY

## 2018-11-28 PROCEDURE — 99204 OFFICE O/P NEW MOD 45 MIN: CPT | Performed by: SURGERY

## 2018-11-28 PROCEDURE — G8420 CALC BMI NORM PARAMETERS: HCPCS | Performed by: SURGERY

## 2018-11-28 PROCEDURE — 4040F PNEUMOC VAC/ADMIN/RCVD: CPT | Performed by: SURGERY

## 2018-11-28 PROCEDURE — G8427 DOCREV CUR MEDS BY ELIG CLIN: HCPCS | Performed by: SURGERY

## 2018-11-28 PROCEDURE — 1036F TOBACCO NON-USER: CPT | Performed by: SURGERY

## 2018-11-28 PROCEDURE — 1090F PRES/ABSN URINE INCON ASSESS: CPT | Performed by: SURGERY

## 2018-11-28 PROCEDURE — 1101F PT FALLS ASSESS-DOCD LE1/YR: CPT | Performed by: SURGERY

## 2018-11-28 RX ORDER — LANOLIN ALCOHOL/MO/W.PET/CERES
1000 CREAM (GRAM) TOPICAL
COMMUNITY
End: 2021-01-01

## 2018-11-28 RX ORDER — VITAMIN E 268 MG
400 CAPSULE ORAL
COMMUNITY
End: 2021-01-01

## 2018-11-28 RX ORDER — IRBESARTAN 300 MG/1
300 TABLET ORAL NIGHTLY
COMMUNITY
End: 2018-12-28 | Stop reason: SDUPTHER

## 2018-12-17 RX ORDER — TRAZODONE HYDROCHLORIDE 50 MG/1
TABLET ORAL
Qty: 15 TABLET | Refills: 0 | Status: SHIPPED | OUTPATIENT
Start: 2018-12-17 | End: 2019-01-09

## 2018-12-18 ENCOUNTER — HOSPITAL ENCOUNTER (OUTPATIENT)
Dept: CARDIOLOGY | Age: 83
Discharge: HOME | End: 2018-12-18
Payer: MEDICARE

## 2018-12-18 DIAGNOSIS — I83.811 VARICOSE VEINS OF RIGHT LOWER EXTREMITY WITH PAIN: ICD-10-CM

## 2018-12-18 DIAGNOSIS — M79.89 LEG SWELLING: ICD-10-CM

## 2018-12-18 DIAGNOSIS — I65.21 CAROTID STENOSIS, RIGHT: ICD-10-CM

## 2018-12-18 PROCEDURE — 93970 EXTREMITY STUDY: CPT

## 2018-12-18 PROCEDURE — 93880 EXTRACRANIAL BILAT STUDY: CPT

## 2018-12-18 NOTE — PROGRESS NOTES
[facility] - [facility]    This is a pre read worksheet - prior to official physician interpretation    Mata Herman  3/14/1933  Date of study: 12/18/18  [id number]    Indication for study:  Leg pain and swelling, varicose veins  Study : Bilateral Lower Extremity Venous Duplex and Reflux Examination    Duplex examination of the common, deep, superficial femoral, and the popliteal veins of the RIGHT lower extremity identifies spontaneous flow. All scanned veins are compressible and free of echogenic densities. There was no evidence of reflux in the right greater saphenous vein. The right greater saphenous vein measured 0.3x 0.3 cm. Duplex examination of the common, deep, superficial femoral, and the popliteal veins of the LEFT lower extremity identifies spontaneous flow. All scanned veins are compressible and free of echogenic densities. There was no evidence of reflux in the left greater saphenous vein. The left greater saphenous vein measured 0.5x 0.5 cm.

## 2018-12-23 ENCOUNTER — TELEPHONE (OUTPATIENT)
Dept: VASCULAR SURGERY | Age: 83
End: 2018-12-23

## 2018-12-24 NOTE — PROCEDURES
[address]                  [address] [facility],  [facility]                                VASCULAR REPORT    PATIENT NAME: Isaiah Hernandez                 :        1933  MED REC NO:   [id number]                            ROOM:  ACCOUNT NO:   [de-identified]                           ADMIT DATE: 2018  PROVIDER:     Elio Del Rio MD    DATE OF PROCEDURE:  2018    VENOUS ULTRASOUND    INDICATIONS:  Aching in the legs with varicose veins. FINDINGS:  Venous Duplex of the right leg revealed that all the major  deep veins have normal color flow, normal compression without evidence  of deep vein thrombosis. No reflux noted over the right saphenofemoral  junction. The diameter of the greater saphenous vein is 3 mm. Venous Duplex of the left leg revealed that all the major deep veins  have normal color flow, normal compression without evidence of deep vein  thrombosis. No reflux noted over the left saphenofemoral junction. The  diameter of the greater saphenous vein is 5 mm. IMPRESSION:  1. Normal venous ultrasound of bilateral without evidence of deep vein  thrombosis. 2.  No evidence of reflux noticed over the greater saphenofemoral  junction bilaterally. Milagros De MD    D: 2018 11:32:15       T: 2018 12:46:58     [id number]  Job#: [id number]     Doc#: [id number]    CC:   Leopold Ard, MD

## 2018-12-26 ENCOUNTER — TELEPHONE (OUTPATIENT)
Dept: VASCULAR SURGERY | Age: 83
End: 2018-12-26

## 2018-12-27 ENCOUNTER — TELEPHONE (OUTPATIENT)
Dept: VASCULAR SURGERY | Age: 83
End: 2018-12-27

## 2018-12-27 NOTE — TELEPHONE ENCOUNTER
----- Message from Rubina Berg MD sent at 12/26/2018  7:09 PM EST -----  Please give her an appt to see me X 2 years for carotid follow up

## 2018-12-28 RX ORDER — IRBESARTAN 300 MG/1
TABLET ORAL
Qty: 90 TABLET | Refills: 2 | Status: SHIPPED
Start: 2018-12-28 | End: 2020-01-01

## 2019-01-09 ENCOUNTER — OFFICE VISIT (OUTPATIENT)
Dept: NEUROLOGY | Age: 84
End: 2019-01-09
Payer: MEDICARE

## 2019-01-09 VITALS
SYSTOLIC BLOOD PRESSURE: 123 MMHG | RESPIRATION RATE: 14 BRPM | OXYGEN SATURATION: 98 % | HEIGHT: 60 IN | HEART RATE: 75 BPM | DIASTOLIC BLOOD PRESSURE: 73 MMHG | BODY MASS INDEX: 25.31 KG/M2 | WEIGHT: 128.9 LBS

## 2019-01-09 DIAGNOSIS — F02.80 EARLY ONSET ALZHEIMER'S DEMENTIA WITHOUT BEHAVIORAL DISTURBANCE (HCC): Primary | ICD-10-CM

## 2019-01-09 DIAGNOSIS — G30.0 EARLY ONSET ALZHEIMER'S DEMENTIA WITHOUT BEHAVIORAL DISTURBANCE (HCC): Primary | ICD-10-CM

## 2019-01-09 PROCEDURE — 99214 OFFICE O/P EST MOD 30 MIN: CPT | Performed by: CLINICAL NURSE SPECIALIST

## 2019-01-09 PROCEDURE — 1036F TOBACCO NON-USER: CPT | Performed by: CLINICAL NURSE SPECIALIST

## 2019-01-09 PROCEDURE — 1090F PRES/ABSN URINE INCON ASSESS: CPT | Performed by: CLINICAL NURSE SPECIALIST

## 2019-01-09 PROCEDURE — 1123F ACP DISCUSS/DSCN MKR DOCD: CPT | Performed by: CLINICAL NURSE SPECIALIST

## 2019-01-09 PROCEDURE — 1101F PT FALLS ASSESS-DOCD LE1/YR: CPT | Performed by: CLINICAL NURSE SPECIALIST

## 2019-01-09 PROCEDURE — G8484 FLU IMMUNIZE NO ADMIN: HCPCS | Performed by: CLINICAL NURSE SPECIALIST

## 2019-01-09 PROCEDURE — 4040F PNEUMOC VAC/ADMIN/RCVD: CPT | Performed by: CLINICAL NURSE SPECIALIST

## 2019-01-09 PROCEDURE — G8399 PT W/DXA RESULTS DOCUMENT: HCPCS | Performed by: CLINICAL NURSE SPECIALIST

## 2019-01-09 PROCEDURE — G8599 NO ASA/ANTIPLAT THER USE RNG: HCPCS | Performed by: CLINICAL NURSE SPECIALIST

## 2019-01-09 PROCEDURE — G8419 CALC BMI OUT NRM PARAM NOF/U: HCPCS | Performed by: CLINICAL NURSE SPECIALIST

## 2019-01-09 PROCEDURE — G8427 DOCREV CUR MEDS BY ELIG CLIN: HCPCS | Performed by: CLINICAL NURSE SPECIALIST

## 2019-01-09 RX ORDER — TRAZODONE HYDROCHLORIDE 50 MG/1
50 TABLET ORAL NIGHTLY
Qty: 30 TABLET | Refills: 1 | Status: SHIPPED | OUTPATIENT
Start: 2019-01-09 | End: 2019-08-02 | Stop reason: DRUGHIGH

## 2019-01-28 ENCOUNTER — TELEPHONE (OUTPATIENT)
Dept: PRIMARY CARE CLINIC | Age: 84
End: 2019-01-28

## 2019-03-27 ENCOUNTER — APPOINTMENT (OUTPATIENT)
Dept: CT IMAGING | Age: 84
End: 2019-03-27
Payer: MEDICARE

## 2019-03-27 ENCOUNTER — HOSPITAL ENCOUNTER (EMERGENCY)
Age: 84
Discharge: HOME | End: 2019-03-27
Attending: EMERGENCY MEDICINE
Payer: MEDICARE

## 2019-03-27 VITALS
WEIGHT: 126 LBS | OXYGEN SATURATION: 97 % | SYSTOLIC BLOOD PRESSURE: 153 MMHG | HEIGHT: 60 IN | RESPIRATION RATE: 14 BRPM | BODY MASS INDEX: 24.74 KG/M2 | HEART RATE: 74 BPM | DIASTOLIC BLOOD PRESSURE: 80 MMHG | TEMPERATURE: 98 F

## 2019-03-27 DIAGNOSIS — N39.0 URINARY TRACT INFECTION WITHOUT HEMATURIA, SITE UNSPECIFIED: ICD-10-CM

## 2019-03-27 DIAGNOSIS — R20.2 PARESTHESIA OF BOTH LOWER EXTREMITIES: Primary | ICD-10-CM

## 2019-03-27 DIAGNOSIS — M54.16 LUMBAR RADICULOPATHY: ICD-10-CM

## 2019-03-27 LAB
ANION GAP SERPL CALCULATED.3IONS-SCNC: 8 MMOL/L (ref 7–16)
BACTERIA: ABNORMAL /HPF
BASOPHILS ABSOLUTE: 0.04 E9/L (ref 0–0.2)
BASOPHILS RELATIVE PERCENT: 0.9 % (ref 0–2)
BILIRUBIN URINE: NEGATIVE
BLOOD, URINE: NEGATIVE
BUN BLDV-MCNC: 15 MG/DL (ref 8–23)
CALCIUM SERPL-MCNC: 9.6 MG/DL (ref 8.6–10.2)
CHLORIDE BLD-SCNC: 102 MMOL/L (ref 98–107)
CLARITY: CLEAR
CO2: 30 MMOL/L (ref 22–29)
COLOR: YELLOW
CREAT SERPL-MCNC: 0.9 MG/DL (ref 0.5–1)
EOSINOPHILS ABSOLUTE: 0.08 E9/L (ref 0.05–0.5)
EOSINOPHILS RELATIVE PERCENT: 1.8 % (ref 0–6)
GFR AFRICAN AMERICAN: >60
GFR NON-AFRICAN AMERICAN: 59 ML/MIN/1.73
GLUCOSE BLD-MCNC: 110 MG/DL (ref 74–99)
GLUCOSE URINE: NEGATIVE MG/DL
HCT VFR BLD CALC: 40.1 % (ref 34–48)
HEMOGLOBIN: 12.9 G/DL (ref 11.5–15.5)
IMMATURE GRANULOCYTES #: 0.01 E9/L
IMMATURE GRANULOCYTES %: 0.2 % (ref 0–5)
KETONES, URINE: NEGATIVE MG/DL
LACTIC ACID: 1.1 MMOL/L (ref 0.5–2.2)
LEUKOCYTE ESTERASE, URINE: ABNORMAL
LYMPHOCYTES ABSOLUTE: 0.78 E9/L (ref 1.5–4)
LYMPHOCYTES RELATIVE PERCENT: 17.4 % (ref 20–42)
MCH RBC QN AUTO: 31.2 PG (ref 26–35)
MCHC RBC AUTO-ENTMCNC: 32.2 % (ref 32–34.5)
MCV RBC AUTO: 96.9 FL (ref 80–99.9)
MONOCYTES ABSOLUTE: 0.42 E9/L (ref 0.1–0.95)
MONOCYTES RELATIVE PERCENT: 9.4 % (ref 2–12)
NEUTROPHILS ABSOLUTE: 3.15 E9/L (ref 1.8–7.3)
NEUTROPHILS RELATIVE PERCENT: 70.3 % (ref 43–80)
NITRITE, URINE: NEGATIVE
PDW BLD-RTO: 13.5 FL (ref 11.5–15)
PH UA: 6.5 (ref 5–9)
PLATELET # BLD: 144 E9/L (ref 130–450)
PMV BLD AUTO: 10.5 FL (ref 7–12)
POTASSIUM SERPL-SCNC: 4.2 MMOL/L (ref 3.5–5)
PROTEIN UA: NEGATIVE MG/DL
RBC # BLD: 4.14 E12/L (ref 3.5–5.5)
RBC UA: ABNORMAL /HPF (ref 0–2)
SODIUM BLD-SCNC: 140 MMOL/L (ref 132–146)
SPECIFIC GRAVITY UA: <=1.005 (ref 1–1.03)
TROPONIN: <0.01 NG/ML (ref 0–0.03)
UROBILINOGEN, URINE: 0.2 E.U./DL
WBC # BLD: 4.5 E9/L (ref 4.5–11.5)
WBC UA: ABNORMAL /HPF (ref 0–5)

## 2019-03-27 PROCEDURE — 81001 URINALYSIS AUTO W/SCOPE: CPT

## 2019-03-27 PROCEDURE — 72131 CT LUMBAR SPINE W/O DYE: CPT

## 2019-03-27 PROCEDURE — 99284 EMERGENCY DEPT VISIT MOD MDM: CPT

## 2019-03-27 PROCEDURE — 85025 COMPLETE CBC W/AUTO DIFF WBC: CPT

## 2019-03-27 PROCEDURE — 70450 CT HEAD/BRAIN W/O DYE: CPT

## 2019-03-27 PROCEDURE — 83605 ASSAY OF LACTIC ACID: CPT

## 2019-03-27 PROCEDURE — 36415 COLL VENOUS BLD VENIPUNCTURE: CPT

## 2019-03-27 PROCEDURE — 84484 ASSAY OF TROPONIN QUANT: CPT

## 2019-03-27 PROCEDURE — 80048 BASIC METABOLIC PNL TOTAL CA: CPT

## 2019-03-27 RX ORDER — CEFDINIR 300 MG/1
300 CAPSULE ORAL 2 TIMES DAILY
Qty: 20 CAPSULE | Refills: 0 | Status: SHIPPED | OUTPATIENT
Start: 2019-03-27 | End: 2019-04-06

## 2019-03-27 ASSESSMENT — ENCOUNTER SYMPTOMS
DIARRHEA: 0
BACK PAIN: 0
CHEST TIGHTNESS: 0
COLOR CHANGE: 0
ABDOMINAL PAIN: 1
VOMITING: 0
BLOOD IN STOOL: 0
CONSTIPATION: 1
NAUSEA: 0
SHORTNESS OF BREATH: 0
COUGH: 0

## 2019-04-07 LAB
EKG ATRIAL RATE: 78 BPM
EKG P AXIS: 61 DEGREES
EKG P-R INTERVAL: 192 MS
EKG Q-T INTERVAL: 368 MS
EKG QRS DURATION: 90 MS
EKG QTC CALCULATION (BAZETT): 419 MS
EKG R AXIS: 3 DEGREES
EKG T AXIS: 31 DEGREES
EKG VENTRICULAR RATE: 78 BPM

## 2019-05-10 ENCOUNTER — OFFICE VISIT (OUTPATIENT)
Dept: NEUROLOGY | Age: 84
End: 2019-05-10
Payer: MEDICARE

## 2019-05-10 VITALS
SYSTOLIC BLOOD PRESSURE: 132 MMHG | HEIGHT: 60 IN | RESPIRATION RATE: 16 BRPM | HEART RATE: 87 BPM | DIASTOLIC BLOOD PRESSURE: 63 MMHG | BODY MASS INDEX: 24.74 KG/M2 | WEIGHT: 126 LBS | OXYGEN SATURATION: 98 %

## 2019-05-10 DIAGNOSIS — E78.5 HYPERLIPIDEMIA, UNSPECIFIED HYPERLIPIDEMIA TYPE: ICD-10-CM

## 2019-05-10 DIAGNOSIS — F02.80 EARLY ONSET ALZHEIMER'S DEMENTIA WITHOUT BEHAVIORAL DISTURBANCE (HCC): Primary | ICD-10-CM

## 2019-05-10 DIAGNOSIS — R53.83 OTHER FATIGUE: ICD-10-CM

## 2019-05-10 DIAGNOSIS — G30.0 EARLY ONSET ALZHEIMER'S DEMENTIA WITHOUT BEHAVIORAL DISTURBANCE (HCC): Primary | ICD-10-CM

## 2019-05-10 PROCEDURE — 4040F PNEUMOC VAC/ADMIN/RCVD: CPT | Performed by: CLINICAL NURSE SPECIALIST

## 2019-05-10 PROCEDURE — 1036F TOBACCO NON-USER: CPT | Performed by: CLINICAL NURSE SPECIALIST

## 2019-05-10 PROCEDURE — 1123F ACP DISCUSS/DSCN MKR DOCD: CPT | Performed by: CLINICAL NURSE SPECIALIST

## 2019-05-10 PROCEDURE — G8599 NO ASA/ANTIPLAT THER USE RNG: HCPCS | Performed by: CLINICAL NURSE SPECIALIST

## 2019-05-10 PROCEDURE — 1090F PRES/ABSN URINE INCON ASSESS: CPT | Performed by: CLINICAL NURSE SPECIALIST

## 2019-05-10 PROCEDURE — G8427 DOCREV CUR MEDS BY ELIG CLIN: HCPCS | Performed by: CLINICAL NURSE SPECIALIST

## 2019-05-10 PROCEDURE — G8420 CALC BMI NORM PARAMETERS: HCPCS | Performed by: CLINICAL NURSE SPECIALIST

## 2019-05-10 PROCEDURE — 99214 OFFICE O/P EST MOD 30 MIN: CPT | Performed by: CLINICAL NURSE SPECIALIST
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
## 2019-05-13 LAB
FOLATE: 24
TOTAL CK: 33 U/L
VITAMIN B-12: 856

## 2019-05-15 DIAGNOSIS — R53.83 OTHER FATIGUE: ICD-10-CM

## 2019-05-15 DIAGNOSIS — E78.5 HYPERLIPIDEMIA, UNSPECIFIED HYPERLIPIDEMIA TYPE: ICD-10-CM

## 2019-06-05 ENCOUNTER — APPOINTMENT (OUTPATIENT)
Dept: GENERAL RADIOLOGY | Age: 84
End: 2019-06-05
Payer: MEDICARE

## 2019-06-05 ENCOUNTER — HOSPITAL ENCOUNTER (EMERGENCY)
Age: 84
Discharge: HOME | End: 2019-06-05
Payer: MEDICARE

## 2019-06-05 VITALS
OXYGEN SATURATION: 96 % | WEIGHT: 123 LBS | HEIGHT: 60 IN | RESPIRATION RATE: 18 BRPM | TEMPERATURE: 98.6 F | DIASTOLIC BLOOD PRESSURE: 69 MMHG | BODY MASS INDEX: 24.15 KG/M2 | HEART RATE: 86 BPM | SYSTOLIC BLOOD PRESSURE: 157 MMHG

## 2019-06-05 DIAGNOSIS — S63.502A WRIST SPRAIN, LEFT, INITIAL ENCOUNTER: Primary | ICD-10-CM

## 2019-06-05 DIAGNOSIS — S60.212A CONTUSION OF LEFT WRIST, INITIAL ENCOUNTER: ICD-10-CM

## 2019-06-05 PROCEDURE — 99283 EMERGENCY DEPT VISIT LOW MDM: CPT

## 2019-06-05 PROCEDURE — 73090 X-RAY EXAM OF FOREARM: CPT

## 2019-06-05 PROCEDURE — 73110 X-RAY EXAM OF WRIST: CPT

## 2019-06-05 PROCEDURE — 73130 X-RAY EXAM OF HAND: CPT

## 2019-06-05 NOTE — ED PROVIDER NOTES
HPI:  6/5/19,   Time: 4:07 PM         Julissa Gagnon is a 80 y.o. female presenting to the ED for left wrist pain, beginning 2 days ago. The complaint has been persistent, mild in severity, and worsened by movement of wrist.  And states that she was trying to step when she fell landing on the wrist on the left side. She did not hit her head. She denies any headache or loss of consciousness. She is on any blood thinners. She is having pain just above the left wrist. Some bruising to the left hand and upper arm. She is right-hand dominant. Denies any headache, neck pain, nausea or vomiting. ROS:   Pertinent positives and negatives are stated within HPI, all other systems reviewed and are negative.  --------------------------------------------- PAST HISTORY ---------------------------------------------  Past Medical History:  has a past medical history of Anxiety, Arthritis, Back pain, Bilateral carotid bruits, Blood circulation, collateral, Cancer of esophagus (HCC), Carotid artery stenosis, Carotid bruit, Carotid stenosis, right, GERD (gastroesophageal reflux disease), GERD (gastroesophageal reflux disease), H/O burning pain in leg, Hypertension, Leg swelling, Lymphedema of lower extremity, Mixed hyperlipidemia, Peripheral vascular disease ([address] Spider veins, and Varicose veins of right lower extremity with pain. Past Surgical History:  has a past surgical history that includes Hysterectomy; ECHO Compl W Dop Color Flow (11/6/2011); Colonoscopy; Cardiac surgery; Esophagus surgery; Upper gastrointestinal endoscopy (11-2-2011); and Upper gastrointestinal endoscopy (05/2017). Social History:  reports that she has never smoked. She has never used smokeless tobacco. She reports that she does not drink alcohol or use drugs. Family History: family history includes Cancer in her brother and brother; Early Death in her brother; Heart Disease in her sister.      The patients home medications have been reviewed. Allergies: Codeine and Vicodin [hydrocodone-acetaminophen]    -------------------------------------------------- RESULTS -------------------------------------------------  All laboratory and radiology results have been personally reviewed by myself   LABS:  No results found for this visit on 06/05/19. RADIOLOGY:  Interpreted by Radiologist.  XR RADIUS ULNA LEFT (2 VIEWS)   Final Result   1. No acute fracture. If there is persistent clinical pain or   symptomatology, a return to medical attention within 2-7 days and   further imaging is recommended. .   2. Osteopenia. 3. Moderate first digit CMC joint osteoarthritis. XR WRIST LEFT (MIN 3 VIEWS)   Final Result   1. No acute fracture identified. If there is persistent clinical pain   or symptomatology, a return to medical attention within 2-7 days and   further imaging is recommended. .   2. Osteopenia. 3. Moderate first digit CMC joint osteoarthritis. XR HAND LEFT (MIN 3 VIEWS)   Final Result   1. No acute fracture identified. If there is persistent clinical pain   or symptomatology, a return to medical attention within 2-7 days and   further imaging is recommended. .   2. Osteopenia with suspected subchondral cystic degeneration and/or   geode formation overlying scaphoid, unchanged since previous exam.   3. Moderate osteoarthritis first digit CMC joint and the digits 2-5   DIP joints.             ------------------------- NURSING NOTES AND VITALS REVIEWED ---------------------------   The nursing notes within the ED encounter and vital signs as below have been reviewed.    BP (!) 157/69   Pulse 86   Temp 98.6 °F (37 °C) (Oral)   Resp 18   Ht 5' (1.524 m)   Wt 123 lb (55.8 kg)   SpO2 96%   BMI 24.02 kg/m²   Oxygen Saturation Interpretation: Normal      ---------------------------------------------------PHYSICAL EXAM--------------------------------------      Constitutional/General: Alert and oriented x3, well appearing, non toxic in NAD  Head: NC/AT  Eyes: PERRL, EOMI  Mouth: Oropharynx clear, handling secretions, no trismus  Neck: Supple, full ROM, no meningeal signs  Pulmonary: Lungs clear to auscultation bilaterally, no wheezes, rales, or rhonchi. Not in respiratory distress  Cardiovascular:  Regular rate and rhythm, no murmurs, gallops, or rubs. 2+ distal pulses  Abdomen: Soft, non tender, non distended,   Extremities: Moves all extremities x 4. Warm and well perfused. For range of motion of bilateral upper extremities. There is tenderness to the left wrist.  Snuffbox tenderness bilaterally. Some ecchymosis noted to the left wrist and left thumb and proximal forearm. Distal pulses are intact. Cap refill is less than 3 seconds. Skin: warm and dry without rash. Abrasion noted to the left thumb. Neurologic: GCS 15,   Psych: Normal Affect      ------------------------------ ED COURSE/MEDICAL DECISION MAKING----------------------  Medications - No data to display      Medical Decision Making:      She was seen independently. Results reviewed with patient and family member. We'll place her in a splint and Ace, elevate the extremity. Take Tylenol as needed for the pain. She'll follow-up with her family doctor. If symptoms are not improved within a week she has have repeat imaging. Counseling: The emergency provider has spoken with the patient and discussed todays results, in addition to providing specific details for the plan of care and counseling regarding the diagnosis and prognosis. Questions are answered at this time and they are agreeable with the plan.      --------------------------------- IMPRESSION AND DISPOSITION ---------------------------------    IMPRESSION  1. Wrist sprain, left, initial encounter New Problem   2.  Contusion of left wrist, initial encounter New Problem       DISPOSITION  Disposition: Discharge to home  Patient condition is stable                 Vanda Yinma  06/05/19 [id number]

## 2019-06-07 ENCOUNTER — HOSPITAL ENCOUNTER (OUTPATIENT)
Dept: SPEECH THERAPY | Age: 84
Setting detail: THERAPIES SERIES
Discharge: HOME | End: 2019-06-07
Payer: MEDICARE

## 2019-06-07 PROCEDURE — 96125 COGNITIVE TEST BY HC PRO: CPT

## 2019-06-07 NOTE — PROGRESS NOTES
SPEECH-LANGUAGE PATHOLOGY  Cognitive Evaluation      PATIENT NAME:  Sandhya Lepe        :  3/14/1933        TODAY'S DATE:  2019    SIGNIFICANT INFORMATION:  Sandhya Lepe was referred by JOBY Nieves to [facility]'s Outpatient Speech Pathology Department for cognitive evaluation and treatment due to a diagnosis of early onset Alzheimer's dementia without behavioral disturbance. Patient was accompanied to the session by her son, Nicole Tanner. Patient reported that she is having difficulty with her memory. Patient's son reported that she was prescribed a medication to help with memory, but that she quit taking it. Patient lives alone in a house. Patient has 4 children. Her son, Nicole Tanner and daughter, Robert Harris assist with her care. The patient's two sisters also assist with her care. The patient's daughter manages her medications and finances. Patient no longer drives. Patient is retired from [employer]. Patient completed high school. Patient reported no difficulties with learning. Patient reported intermittent periods of confusion, forgetting to take her medications, and losing her train of thought. COGNITIVE EVALUATION:  Patient was evaluated using the [employer].  Results of the assessment will be indicated below:     [employer] Financial Assessment-Geriatric:    Subtest Raw Score Percentile Standard Score Severity          Immediate Memory 27 75 12 Moderate-mild   Recent Memory 26 84 13 mild   Temporal Orientation 28 84 13 mild   Spatial Orientation 24 50 10 moderate   Orientation to Environment 29 91 14 [facility]   Recall of General Information 26 75 12 Moderate-mild   Problem Solving & Abstract Reasoning 27 63 11 moderate   Organization of Information 29 98 16 [facility]   Auditory Processing & Comprehension 28 75 12 Moderate-mild   Problem Solving and  [place] Reasoning 29 91 14 [facility]   Naming Common Objects 30 91 14 [facility]   Functional Oral Reading 30 91 14 WFL     Composites Sum of Standard Scores Quotients Percentile Rank Severity Rating   Information Processing 127 119 90 Mild-WFL   Memory 37 115 84 mild   Orientation 37 115 84 mild   Problem Solving 41 124 95 WFL       VARIANT OBSERVATIONS:     Present Absent   Error (e) x    Perseveration (p)  x   Repeat Instructions/Stimulus (r) x    Denial/Refusal (d) x    Delayed Response (dl) x    Confabulation (c)  x   Partially Correct (pc) x    Irrelevant (i)  x   Tangential (t)  x   Self-corrected (sc) x      SPEECH DIAGNOSIS:  Mild cognitive impairment    RECOMMENDATIONS/TREATMENT PLAN:  Therapy is recommended one time per week for 30-60 minute sessions for an estimated 14 visits. 1.  Patient will improve immediate memory for functional tasks such as recording phone messages to a supervisory assistance level with greater than 90% accuracy and the use of compensatory aids. 2.  Patient will improve recent memory to a supervisory assistance level with greater than 90% accuracy with the use of a memory log/calendar aid   3. Patient will improve temporal orientation (recent/remote memory) to a supervisory assistance level with greater than 90% accuracy and the use of a calendar aid   4. Patient will improve spatial orientation to a supervisory assistance level with greater than 90% accuracy and the use of a memory log   5. Patient will improve auditory processing and retention to greater than 90%   6. Patient will complete the Assessment for Language Related Functional Activities   7. Patient will identify and spontaneously use compensatory techniques to assist with memory with greater than 90% accuracy  8. Patient will improve problem solving for functional activities such as financial, medication, and schedule management to a supervisory assistance level with greater than 90% accuracy and the use of compensatory aids/strategies.     EDUCATION:  Speech Pathologist (SLP) completed education with the patient regarding type of cognitive impairment. Discussed compensatory strategies to promote recall. Encouraged patient to engage SLP in unstructured Q&A session relative to identified deficit areas; indicated understanding of all information provided via satisfactory verbal response. [x]  Evaluation results discussed with [x]patient   [x] family. [x]  Prognosis for improvements is good. []  This plan will be re-evaluated and revised in 1 week  if warranted. [x]  Patient stated goals: improve memory. [x]  Treatment goals discussed with [x]  patient/  [x]  family. [x]  The [x]  patient/ [x]  family understand the diagnosis, prognosis and plan of care. [x]Fall risk assessment completed  [x]The admitting diagnosis and active problem list, as listed below have been reviewed prior to initiation of this evaluation. ADMITTING DIAGNOSIS: No admission diagnoses are documented for this encounter. ACTIVE PROBLEM LIST:   Patient Active Problem List   Diagnosis    Syncope and collapse    GI bleed    Fatigue    Systolic hypertension    Mitral regurgitation    Aortic regurgitation    Lymphedema of lower extremity    Esophageal dysphagia    Gastric lymphoma (HCC)    Mixed hyperlipidemia    Sleep disorder    Lumbar disc disease    Chest wall trauma    Blunt injury of abdomen    Closed fracture of sternum    Fracture of body of sternum, initial encounter for closed fracture    Leg swelling    Bilateral carotid bruits    Carotid stenosis, right    Spider veins    Varicose veins of right lower extremity with pain    H/O burning pain in leg       [address]  T: [phone]   F: [phone]     If you have any questions or concerns, please don't hesitate to call.   Thank you for your referral.    Physician/Provider Signature:________________________________Date:__________________  By signing above, the therapists plan is approved by the physician/provider. All patients under [employer] must be signed by physician/provider.

## 2019-06-14 ENCOUNTER — HOSPITAL ENCOUNTER (OUTPATIENT)
Dept: SPEECH THERAPY | Age: 84
Setting detail: THERAPIES SERIES
Discharge: HOME | End: 2019-06-14
Payer: MEDICARE

## 2019-06-14 PROCEDURE — 97127 HC SP THER IVNTJ W/FOCUS COG FUNCJ: CPT

## 2019-06-14 NOTE — PROGRESS NOTES
Patient was seen for cognitive therapy. She was accompanied to the session by her son. The following was targeted:    · Formalized assessment of cognition was continued via the Assessment of Language-Related Functional Activities (NÉSTOR). This assessment measures the patient's ability to complete functional cognitive-language activities of daily living. An independent functioning rating of 1 indicates a high probability of independent functioning on that task. A score of 2 indicates the need for some level of assistance on that task. A score of 3 indicates a high probability that the patient is not able to function independently on that task assessed. Results of the assessment thus far will be indicated below. SUBTEST PERCENT CORRECT INDEPENDENT FUNCTIONING RATING   1. Telling Time 90 1   2. Counting Money 90 1   3. Addressing an Envelope 100 1   4. Daily Math Problems 80 1   5. Writing a Check       Balancing a Checkbook DNT DNT   6. Understanding Medicine Labels DNT DNT   7. Understanding a Calendar 100 1   8. Reading Instructions DNT DNT   9. Using a Telephone DNT DNT   10.  Writing Phone Messages DNT DNT       Continue plan of care. Treatment plan and goals can be found in the initial evaluation report.

## 2019-07-11 ENCOUNTER — HOSPITAL ENCOUNTER (OUTPATIENT)
Dept: SPEECH THERAPY | Age: 84
Setting detail: THERAPIES SERIES
Discharge: HOME | End: 2019-07-11
Payer: MEDICARE

## 2019-07-11 PROCEDURE — 97127 HC SP THER IVNTJ W/FOCUS COG FUNCJ: CPT

## 2019-07-19 ENCOUNTER — HOSPITAL ENCOUNTER (OUTPATIENT)
Dept: SPEECH THERAPY | Age: 84
Setting detail: THERAPIES SERIES
Discharge: HOME | End: 2019-07-19
Payer: MEDICARE

## 2019-07-19 PROCEDURE — 97127 HC SP THER IVNTJ W/FOCUS COG FUNCJ: CPT

## 2019-07-26 ENCOUNTER — HOSPITAL ENCOUNTER (OUTPATIENT)
Dept: SPEECH THERAPY | Age: 84
Setting detail: THERAPIES SERIES
Discharge: HOME | End: 2019-07-26
Payer: MEDICARE

## 2019-07-26 PROCEDURE — 97127 HC SP THER IVNTJ W/FOCUS COG FUNCJ: CPT

## 2019-07-26 NOTE — PROGRESS NOTES
Ms. Maria D Wan was seen for cognitive therapy today. Patient's son was present for the session. Therapy targeted:    · Financial management was targeted. Patient calculated change with 80% accuracy. · Kaitlyn Lindquist was then given money amounts written in words. The patient was asked to write the amounts in numbers. Kaitlyn Lindquist demonstrated 39% accuracy. · Next, the patient was provided two checks and asked to fill them out according to the information provided. The patient demonstrated 100% accuracy. · Lastly, Kaitlyn Lindquist was given a check registry that was completed. The patient was asked to answer questions regarding the registry and complete the missing information. Overall, Kaitlyn Lindquist demonstrated 60% accuracy. Compensatory strategies to improve organization, improve orientation on the page, and reduce math errors in check booking were introduced. Compensatory strategies included:   High-lighting the balance and every other line to help with visual orientation on the page. Always writing the balance on the high-lighted line. Writing the description of each transaction on the white lines. Using +/- symbols on the balance column. Completing the math right on the registry in order to reduce math errors. Using a calculator to double check math calculations. Lining numbers up in columns on the page. Completing each activity/transaction before moving onto the next activity/transaction. Prior to initiating these strategies, the patient demonstrated the need for moderate assistance. Once implemented, she demonstrated the need for minimal assistance with 100% accuracy. She demonstrated the need for cues to use +/- symbols, accurately use the calculator, line up columns of numbers, and complete math in the registry first.     Continue plan of care. Treatment plan and goals can be found in the initial evaluation report.

## 2019-08-02 ENCOUNTER — HOSPITAL ENCOUNTER (OUTPATIENT)
Dept: SPEECH THERAPY | Age: 84
Setting detail: THERAPIES SERIES
Discharge: HOME | End: 2019-08-02
Payer: MEDICARE

## 2019-08-02 ENCOUNTER — OFFICE VISIT (OUTPATIENT)
Dept: NEUROLOGY | Age: 84
End: 2019-08-02
Payer: MEDICARE

## 2019-08-02 VITALS
HEART RATE: 79 BPM | SYSTOLIC BLOOD PRESSURE: 144 MMHG | WEIGHT: 126.1 LBS | OXYGEN SATURATION: 96 % | HEIGHT: 60 IN | BODY MASS INDEX: 24.76 KG/M2 | DIASTOLIC BLOOD PRESSURE: 75 MMHG

## 2019-08-02 DIAGNOSIS — F02.80 EARLY ONSET ALZHEIMER'S DEMENTIA WITHOUT BEHAVIORAL DISTURBANCE (HCC): Primary | ICD-10-CM

## 2019-08-02 DIAGNOSIS — G30.0 EARLY ONSET ALZHEIMER'S DEMENTIA WITHOUT BEHAVIORAL DISTURBANCE (HCC): Primary | ICD-10-CM

## 2019-08-02 PROCEDURE — 99214 OFFICE O/P EST MOD 30 MIN: CPT | Performed by: CLINICAL NURSE SPECIALIST

## 2019-08-02 PROCEDURE — 1090F PRES/ABSN URINE INCON ASSESS: CPT | Performed by: CLINICAL NURSE SPECIALIST

## 2019-08-02 PROCEDURE — G8427 DOCREV CUR MEDS BY ELIG CLIN: HCPCS | Performed by: CLINICAL NURSE SPECIALIST

## 2019-08-02 PROCEDURE — G8599 NO ASA/ANTIPLAT THER USE RNG: HCPCS | Performed by: CLINICAL NURSE SPECIALIST

## 2019-08-02 PROCEDURE — G8420 CALC BMI NORM PARAMETERS: HCPCS | Performed by: CLINICAL NURSE SPECIALIST

## 2019-08-02 PROCEDURE — 1123F ACP DISCUSS/DSCN MKR DOCD: CPT | Performed by: CLINICAL NURSE SPECIALIST

## 2019-08-02 PROCEDURE — 1036F TOBACCO NON-USER: CPT | Performed by: CLINICAL NURSE SPECIALIST

## 2019-08-02 PROCEDURE — 97127 HC SP THER IVNTJ W/FOCUS COG FUNCJ: CPT

## 2019-08-02 PROCEDURE — 4040F PNEUMOC VAC/ADMIN/RCVD: CPT | Performed by: CLINICAL NURSE SPECIALIST

## 2019-08-02 RX ORDER — TRAZODONE HYDROCHLORIDE 50 MG/1
75 TABLET ORAL NIGHTLY
Qty: 30 TABLET | Refills: 1 | Status: SHIPPED | OUTPATIENT
Start: 2019-08-02

## 2019-08-02 NOTE — PROGRESS NOTES
400 UNIT capsule Take 400 Units by mouth Twice a Week Yes Historical Provider, MD   Elastic Bandages & Supports (JOBST KNEE HIGH COMPRESSION SM) MISC Knee high with 20- 30 mmhg of compression Yes Danelle Looney MD   triamcinolone (KENALOG) 0.025 % cream Apply topically 2 times daily. Yes Oly Khanna MD   pantoprazole (PROTONIX) 20 MG tablet take 1 tablet by mouth once daily Yes Oly Khanna MD   dextromethorphan-guaiFENesin [facility] DM)  MG per extended release tablet Take 1 tablet by mouth every 12 hours as needed Yes Historical Provider, MD   Magnesium Hydroxide (MILK OF MAGNESIA PO) Take by mouth Yes Historical Provider, MD   losartan (COZAAR) 100 MG tablet Take 100 mg by mouth daily Yes Historical Provider, MD   lidocaine (LIDODERM) 5 % Place 1 patch onto the skin daily 12 hours on, 12 hours off. Yes Historical Provider, MD   acetaminophen (TYLENOL) 325 MG tablet Take 2 tablets by mouth every 6 hours Yes Jeanmarie Jacinto MD   Multiple Vitamins-Minerals (PRESERVISION AREDS 2 PO) Take by mouth Yes Historical Provider, MD   Coenzyme Q10 (COQ10) 100 MG CAPS Take by mouth daily Yes Historical Provider, MD     Allergies as of 08/02/2019 - Review Complete 08/02/2019   Allergen Reaction Noted    Codeine  11/02/2011    Vicodin [hydrocodone-acetaminophen] Nausea Only 05/15/2018       Objective:     BP (!) 144/75 (Site: Right Upper Arm, Position: Sitting, Cuff Size: Medium Adult)   Pulse 79   Ht 5' (1.524 m)   Wt 126 lb 1.6 oz (57.2 kg)   SpO2 96%   BMI 24.63 kg/m²   Afebrile     General: an elderly woman in no acute distress who was alert, cooperative -- pleasant  Head: normocephalic and atraumatic  Neck: decrease ROM neck with no carotid bruit   Lungs:  clear to auscultation.     Heart: RRR with systolic murmur audible   Peripheral pulses: +1 without bruits  Extremities: no edema appreciated     Grimaces to palpation of right occipital ridge      Mental Status: alert; oriented to person, place and year     Aware of grandchildren and great grandchildren     MMSE 33/28 -- only forget \"tree\" today (previous 31/31)    Speech: clear  Language: appropriate    No mask-like facies - no Myerson's sign     Cranial Nerves:  I: smell    II: visual acuity     II: visual fields Full   II: pupils MARCELLE   III,VII: ptosis None   III,IV,VI: extraocular muscles  Full ROM - smooth pursuits    V: mastication Normal   V: facial light touch sensation  Normal   V,VII: corneal reflex  Present   VII: facial muscle function - upper     VII: facial muscle function - lower Normal   VIII: hearing Normal   IX: soft palate elevation  Normal   IX,X: gag reflex Present   XI: trapezius strength  5/5   XI: sternocleidomastoid strength 5/5   XI: neck extension strength  5/5   XII: tongue strength  Normal     Motor:  5/5 throughout  Normal tone and bulk    No tremor  No cogwheel rigidity     Sensory:  LT and PP normal  Vibration moderately decreased in ankles     Coordination:   FN, FFM and STANISLAW symmetrical     Gait:  Slightly slow-- with minimal shuffling  Cane in right hand     DTR:   BE throughout  No grasp or suck reflexes    Laboratory/Radiology:     CBC with Differential:    Lab Results   Component Value Date    WBC 4.5 03/27/2019    RBC 4.14 03/27/2019    HGB 12.9 03/27/2019    HCT 40.1 03/27/2019     03/27/2019    MCV 96.9 03/27/2019    MCH 31.2 03/27/2019    MCHC 32.2 03/27/2019    RDW 13.5 03/27/2019    SEGSPCT 70 07/18/2013    LYMPHOPCT 17.4 03/27/2019    MONOPCT 9.4 03/27/2019    EOSPCT 2 07/12/2017    BASOPCT 0.9 03/27/2019    MONOSABS 0.42 03/27/2019    LYMPHSABS 0.78 03/27/2019    EOSABS 0.08 03/27/2019    BASOSABS 0.04 03/27/2019     CMP:    Lab Results   Component Value Date     03/27/2019    K 4.2 03/27/2019    K 4.2 08/19/2018     03/27/2019    CO2 30 03/27/2019    BUN 15 03/27/2019    CREATININE 0.9 03/27/2019    GFRAA >60 03/27/2019    LABGLOM 59 03/27/2019    GLUCOSE 110 03/27/2019    GLUCOSE 124

## 2019-08-09 ENCOUNTER — APPOINTMENT (OUTPATIENT)
Dept: SPEECH THERAPY | Age: 84
End: 2019-08-09
Payer: MEDICARE

## 2019-08-16 ENCOUNTER — HOSPITAL ENCOUNTER (OUTPATIENT)
Dept: SPEECH THERAPY | Age: 84
Setting detail: THERAPIES SERIES
Discharge: HOME | End: 2019-08-16
Payer: MEDICARE

## 2019-08-16 ENCOUNTER — APPOINTMENT (OUTPATIENT)
Dept: SPEECH THERAPY | Age: 84
End: 2019-08-16
Payer: MEDICARE

## 2019-08-16 PROCEDURE — 97127 HC SP THER IVNTJ W/FOCUS COG FUNCJ: CPT

## 2019-08-23 ENCOUNTER — APPOINTMENT (OUTPATIENT)
Dept: SPEECH THERAPY | Age: 84
End: 2019-08-23
Payer: MEDICARE

## 2019-08-23 ENCOUNTER — HOSPITAL ENCOUNTER (OUTPATIENT)
Dept: SPEECH THERAPY | Age: 84
Setting detail: THERAPIES SERIES
Discharge: HOME | End: 2019-08-23
Payer: MEDICARE

## 2019-08-23 PROCEDURE — 97127 HC SP THER IVNTJ W/FOCUS COG FUNCJ: CPT

## 2019-08-30 ENCOUNTER — APPOINTMENT (OUTPATIENT)
Dept: SPEECH THERAPY | Age: 84
End: 2019-08-30
Payer: MEDICARE

## 2019-08-30 ENCOUNTER — HOSPITAL ENCOUNTER (OUTPATIENT)
Dept: SPEECH THERAPY | Age: 84
Setting detail: THERAPIES SERIES
Discharge: HOME | End: 2019-08-30
Payer: MEDICARE

## 2019-08-30 PROCEDURE — 97127 HC SP THER IVNTJ W/FOCUS COG FUNCJ: CPT

## 2019-09-06 ENCOUNTER — APPOINTMENT (OUTPATIENT)
Dept: SPEECH THERAPY | Age: 84
End: 2019-09-06
Payer: MEDICARE

## 2019-09-06 ENCOUNTER — HOSPITAL ENCOUNTER (OUTPATIENT)
Dept: SPEECH THERAPY | Age: 84
Setting detail: THERAPIES SERIES
Discharge: HOME | End: 2019-09-06
Payer: MEDICARE

## 2019-09-06 PROCEDURE — 97127 HC SP THER IVNTJ W/FOCUS COG FUNCJ: CPT

## 2019-09-13 ENCOUNTER — APPOINTMENT (OUTPATIENT)
Dept: SPEECH THERAPY | Age: 84
End: 2019-09-13
Payer: MEDICARE

## 2019-09-20 ENCOUNTER — APPOINTMENT (OUTPATIENT)
Dept: SPEECH THERAPY | Age: 84
End: 2019-09-20
Payer: MEDICARE

## 2019-09-27 ENCOUNTER — APPOINTMENT (OUTPATIENT)
Dept: SPEECH THERAPY | Age: 84
End: 2019-09-27
Payer: MEDICARE

## 2019-10-03 ENCOUNTER — TELEPHONE (OUTPATIENT)
Dept: NEUROLOGY | Age: 84
End: 2019-10-03

## 2019-10-04 ENCOUNTER — APPOINTMENT (OUTPATIENT)
Dept: SPEECH THERAPY | Age: 84
End: 2019-10-04
Payer: MEDICARE

## 2019-10-04 ENCOUNTER — TELEPHONE (OUTPATIENT)
Dept: NEUROLOGY | Age: 84
End: 2019-10-04

## 2019-10-11 ENCOUNTER — APPOINTMENT (OUTPATIENT)
Dept: SPEECH THERAPY | Age: 84
End: 2019-10-11
Payer: MEDICARE

## 2019-10-11 ENCOUNTER — HOSPITAL ENCOUNTER (OUTPATIENT)
Dept: SPEECH THERAPY | Age: 84
Setting detail: THERAPIES SERIES
Discharge: HOME | End: 2019-10-11
Payer: MEDICARE

## 2019-10-11 PROCEDURE — 97127 HC SP THER IVNTJ W/FOCUS COG FUNCJ: CPT

## 2019-10-18 ENCOUNTER — APPOINTMENT (OUTPATIENT)
Dept: SPEECH THERAPY | Age: 84
End: 2019-10-18
Payer: MEDICARE

## 2019-10-18 ENCOUNTER — HOSPITAL ENCOUNTER (OUTPATIENT)
Dept: SPEECH THERAPY | Age: 84
Setting detail: THERAPIES SERIES
Discharge: HOME | End: 2019-10-18
Payer: MEDICARE

## 2019-10-18 PROCEDURE — 97127 HC SP THER IVNTJ W/FOCUS COG FUNCJ: CPT

## 2019-10-25 ENCOUNTER — HOSPITAL ENCOUNTER (OUTPATIENT)
Dept: SPEECH THERAPY | Age: 84
Setting detail: THERAPIES SERIES
Discharge: HOME | End: 2019-10-25
Payer: MEDICARE

## 2019-10-25 PROCEDURE — 97127 HC SP THER IVNTJ W/FOCUS COG FUNCJ: CPT

## 2019-11-01 ENCOUNTER — APPOINTMENT (OUTPATIENT)
Dept: SPEECH THERAPY | Age: 84
End: 2019-11-01
Payer: MEDICARE

## 2019-11-08 ENCOUNTER — HOSPITAL ENCOUNTER (OUTPATIENT)
Dept: SPEECH THERAPY | Age: 84
Setting detail: THERAPIES SERIES
Discharge: HOME | End: 2019-11-08
Payer: MEDICARE

## 2019-11-08 PROCEDURE — 97127 HC SP THER IVNTJ W/FOCUS COG FUNCJ: CPT

## 2019-11-15 ENCOUNTER — HOSPITAL ENCOUNTER (OUTPATIENT)
Dept: SPEECH THERAPY | Age: 84
Setting detail: THERAPIES SERIES
Discharge: HOME | End: 2019-11-15
Payer: MEDICARE

## 2019-11-15 PROCEDURE — 97127 HC SP THER IVNTJ W/FOCUS COG FUNCJ: CPT

## 2019-11-22 ENCOUNTER — HOSPITAL ENCOUNTER (OUTPATIENT)
Dept: SPEECH THERAPY | Age: 84
Setting detail: THERAPIES SERIES
Discharge: HOME | End: 2019-11-22
Payer: MEDICARE

## 2019-11-22 PROCEDURE — 97127 HC SP THER IVNTJ W/FOCUS COG FUNCJ: CPT

## 2019-11-29 ENCOUNTER — APPOINTMENT (OUTPATIENT)
Dept: SPEECH THERAPY | Age: 84
End: 2019-11-29
Payer: MEDICARE

## 2019-12-06 ENCOUNTER — APPOINTMENT (OUTPATIENT)
Dept: SPEECH THERAPY | Age: 84
End: 2019-12-06
Payer: MEDICARE

## 2019-12-13 ENCOUNTER — HOSPITAL ENCOUNTER (OUTPATIENT)
Dept: SPEECH THERAPY | Age: 84
Setting detail: THERAPIES SERIES
Discharge: HOME | End: 2019-12-13
Payer: MEDICARE

## 2019-12-13 ENCOUNTER — OFFICE VISIT (OUTPATIENT)
Dept: NEUROLOGY | Age: 84
End: 2019-12-13
Payer: MEDICARE

## 2019-12-13 VITALS
RESPIRATION RATE: 17 BRPM | HEART RATE: 80 BPM | WEIGHT: 127 LBS | DIASTOLIC BLOOD PRESSURE: 84 MMHG | SYSTOLIC BLOOD PRESSURE: 132 MMHG | BODY MASS INDEX: 24.94 KG/M2 | HEIGHT: 60 IN

## 2019-12-13 DIAGNOSIS — F02.80 EARLY ONSET ALZHEIMER'S DEMENTIA WITHOUT BEHAVIORAL DISTURBANCE (HCC): Primary | ICD-10-CM

## 2019-12-13 DIAGNOSIS — G30.0 EARLY ONSET ALZHEIMER'S DEMENTIA WITHOUT BEHAVIORAL DISTURBANCE (HCC): Primary | ICD-10-CM

## 2019-12-13 PROCEDURE — G8599 NO ASA/ANTIPLAT THER USE RNG: HCPCS | Performed by: CLINICAL NURSE SPECIALIST

## 2019-12-13 PROCEDURE — G8427 DOCREV CUR MEDS BY ELIG CLIN: HCPCS | Performed by: CLINICAL NURSE SPECIALIST

## 2019-12-13 PROCEDURE — 1036F TOBACCO NON-USER: CPT | Performed by: CLINICAL NURSE SPECIALIST

## 2019-12-13 PROCEDURE — G8420 CALC BMI NORM PARAMETERS: HCPCS | Performed by: CLINICAL NURSE SPECIALIST

## 2019-12-13 PROCEDURE — G8484 FLU IMMUNIZE NO ADMIN: HCPCS | Performed by: CLINICAL NURSE SPECIALIST

## 2019-12-13 PROCEDURE — 99214 OFFICE O/P EST MOD 30 MIN: CPT | Performed by: CLINICAL NURSE SPECIALIST

## 2019-12-13 PROCEDURE — 1123F ACP DISCUSS/DSCN MKR DOCD: CPT | Performed by: CLINICAL NURSE SPECIALIST

## 2019-12-13 PROCEDURE — 97127 HC SP THER IVNTJ W/FOCUS COG FUNCJ: CPT

## 2019-12-13 PROCEDURE — 1090F PRES/ABSN URINE INCON ASSESS: CPT | Performed by: CLINICAL NURSE SPECIALIST

## 2019-12-13 PROCEDURE — 4040F PNEUMOC VAC/ADMIN/RCVD: CPT | Performed by: CLINICAL NURSE SPECIALIST

## 2019-12-20 ENCOUNTER — HOSPITAL ENCOUNTER (OUTPATIENT)
Dept: SPEECH THERAPY | Age: 84
Setting detail: THERAPIES SERIES
Discharge: HOME | End: 2019-12-20
Payer: MEDICARE

## 2019-12-20 PROCEDURE — 97127 HC SP THER IVNTJ W/FOCUS COG FUNCJ: CPT

## 2019-12-27 ENCOUNTER — APPOINTMENT (OUTPATIENT)
Dept: SPEECH THERAPY | Age: 84
End: 2019-12-27
Payer: MEDICARE

## 2020-01-01 ENCOUNTER — APPOINTMENT (OUTPATIENT)
Dept: GENERAL RADIOLOGY | Age: 85
DRG: 544 | End: 2020-01-01
Payer: MEDICARE

## 2020-01-01 ENCOUNTER — APPOINTMENT (OUTPATIENT)
Dept: MRI IMAGING | Age: 85
DRG: 544 | End: 2020-01-01
Payer: MEDICARE

## 2020-01-01 ENCOUNTER — APPOINTMENT (OUTPATIENT)
Dept: CT IMAGING | Age: 85
End: 2020-01-01
Payer: MEDICARE

## 2020-01-01 ENCOUNTER — TELEPHONE (OUTPATIENT)
Dept: NEUROLOGY | Age: 85
End: 2020-01-01

## 2020-01-01 ENCOUNTER — OFFICE VISIT (OUTPATIENT)
Dept: VASCULAR SURGERY | Age: 85
End: 2020-01-01
Payer: MEDICARE

## 2020-01-01 ENCOUNTER — APPOINTMENT (OUTPATIENT)
Dept: GENERAL RADIOLOGY | Age: 85
End: 2020-01-01
Payer: MEDICARE

## 2020-01-01 ENCOUNTER — HOSPITAL ENCOUNTER (EMERGENCY)
Age: 85
Discharge: ANOTHER HOSPITAL | End: 2020-11-19
Attending: EMERGENCY MEDICINE
Payer: MEDICARE

## 2020-01-01 ENCOUNTER — HOSPITAL ENCOUNTER (INPATIENT)
Age: 85
LOS: 3 days | Discharge: NURSING, REHAB OR LONG-TERM CARE | DRG: 544 | End: 2020-11-23
Attending: EMERGENCY MEDICINE | Admitting: SURGERY
Payer: MEDICARE

## 2020-01-01 VITALS — HEIGHT: 59 IN | RESPIRATION RATE: 16 BRPM | WEIGHT: 114 LBS | BODY MASS INDEX: 22.98 KG/M2

## 2020-01-01 VITALS
BODY MASS INDEX: 22.38 KG/M2 | SYSTOLIC BLOOD PRESSURE: 134 MMHG | DIASTOLIC BLOOD PRESSURE: 62 MMHG | OXYGEN SATURATION: 98 % | WEIGHT: 114 LBS | HEART RATE: 85 BPM | TEMPERATURE: 97.9 F | RESPIRATION RATE: 18 BRPM | HEIGHT: 60 IN

## 2020-01-01 VITALS
HEIGHT: 60 IN | HEART RATE: 93 BPM | TEMPERATURE: 98.7 F | RESPIRATION RATE: 18 BRPM | SYSTOLIC BLOOD PRESSURE: 182 MMHG | DIASTOLIC BLOOD PRESSURE: 73 MMHG | WEIGHT: 114 LBS | BODY MASS INDEX: 22.38 KG/M2 | OXYGEN SATURATION: 95 %

## 2020-01-01 LAB
ADENOVIRUS BY PCR: NOT DETECTED
ALBUMIN SERPL-MCNC: 3.7 G/DL (ref 3.5–5.2)
ALBUMIN SERPL-MCNC: 4.2 G/DL (ref 3.5–5.2)
ALP BLD-CCNC: 77 U/L (ref 35–104)
ALP BLD-CCNC: 93 U/L (ref 35–104)
ALT SERPL-CCNC: 17 U/L (ref 0–32)
ALT SERPL-CCNC: 20 U/L (ref 0–32)
ANION GAP SERPL CALCULATED.3IONS-SCNC: 16 MMOL/L (ref 7–16)
ANION GAP SERPL CALCULATED.3IONS-SCNC: 8 MMOL/L (ref 7–16)
ANISOCYTOSIS: ABNORMAL
APTT: 22.1 SEC (ref 24.5–35.1)
AST SERPL-CCNC: 27 U/L (ref 0–31)
AST SERPL-CCNC: 27 U/L (ref 0–31)
BASOPHILS ABSOLUTE: 0.03 E9/L (ref 0–0.2)
BASOPHILS ABSOLUTE: 0.03 E9/L (ref 0–0.2)
BASOPHILS RELATIVE PERCENT: 0.4 % (ref 0–2)
BASOPHILS RELATIVE PERCENT: 0.5 % (ref 0–2)
BILIRUB SERPL-MCNC: 0.7 MG/DL (ref 0–1.2)
BILIRUB SERPL-MCNC: 1.2 MG/DL (ref 0–1.2)
BORDETELLA PARAPERTUSSIS BY PCR: NOT DETECTED
BORDETELLA PERTUSSIS BY PCR: NOT DETECTED
BUN BLDV-MCNC: 14 MG/DL (ref 8–23)
BUN BLDV-MCNC: 21 MG/DL (ref 8–23)
BURR CELLS: ABNORMAL
CALCIUM SERPL-MCNC: 9.4 MG/DL (ref 8.6–10.2)
CALCIUM SERPL-MCNC: 9.8 MG/DL (ref 8.6–10.2)
CHLAMYDOPHILIA PNEUMONIAE BY PCR: NOT DETECTED
CHLORIDE BLD-SCNC: 103 MMOL/L (ref 98–107)
CHLORIDE BLD-SCNC: 104 MMOL/L (ref 98–107)
CO2: 23 MMOL/L (ref 22–29)
CO2: 30 MMOL/L (ref 22–29)
CORONAVIRUS 229E BY PCR: NOT DETECTED
CORONAVIRUS HKU1 BY PCR: NOT DETECTED
CORONAVIRUS NL63 BY PCR: NOT DETECTED
CORONAVIRUS OC43 BY PCR: NOT DETECTED
CREAT SERPL-MCNC: 0.7 MG/DL (ref 0.5–1)
CREAT SERPL-MCNC: 0.8 MG/DL (ref 0.5–1)
EKG ATRIAL RATE: 80 BPM
EKG ATRIAL RATE: 88 BPM
EKG P AXIS: 67 DEGREES
EKG P AXIS: 76 DEGREES
EKG P-R INTERVAL: 184 MS
EKG P-R INTERVAL: 190 MS
EKG Q-T INTERVAL: 356 MS
EKG Q-T INTERVAL: 364 MS
EKG QRS DURATION: 86 MS
EKG QRS DURATION: 86 MS
EKG QTC CALCULATION (BAZETT): 419 MS
EKG QTC CALCULATION (BAZETT): 430 MS
EKG R AXIS: 33 DEGREES
EKG R AXIS: 7 DEGREES
EKG T AXIS: 33 DEGREES
EKG T AXIS: 58 DEGREES
EKG VENTRICULAR RATE: 80 BPM
EKG VENTRICULAR RATE: 88 BPM
EOSINOPHILS ABSOLUTE: 0.07 E9/L (ref 0.05–0.5)
EOSINOPHILS ABSOLUTE: 0.1 E9/L (ref 0.05–0.5)
EOSINOPHILS RELATIVE PERCENT: 0.9 % (ref 0–6)
EOSINOPHILS RELATIVE PERCENT: 1.6 % (ref 0–6)
GFR AFRICAN AMERICAN: >60
GFR AFRICAN AMERICAN: >60
GFR NON-AFRICAN AMERICAN: >60 ML/MIN/1.73
GFR NON-AFRICAN AMERICAN: >60 ML/MIN/1.73
GLUCOSE BLD-MCNC: 105 MG/DL (ref 74–99)
GLUCOSE BLD-MCNC: 90 MG/DL (ref 74–99)
HCT VFR BLD CALC: 38.2 % (ref 34–48)
HCT VFR BLD CALC: 42 % (ref 34–48)
HEMOGLOBIN: 12.6 G/DL (ref 11.5–15.5)
HEMOGLOBIN: 13.5 G/DL (ref 11.5–15.5)
HUMAN METAPNEUMOVIRUS BY PCR: NOT DETECTED
HUMAN RHINOVIRUS/ENTEROVIRUS BY PCR: NOT DETECTED
IMMATURE GRANULOCYTES #: 0.01 E9/L
IMMATURE GRANULOCYTES #: 0.02 E9/L
IMMATURE GRANULOCYTES %: 0.1 % (ref 0–5)
IMMATURE GRANULOCYTES %: 0.3 % (ref 0–5)
INFLUENZA A BY PCR: NOT DETECTED
INFLUENZA B BY PCR: NOT DETECTED
INR BLD: 1
LV EF: 65 %
LVEF MODALITY: NORMAL
LYMPHOCYTES ABSOLUTE: 0.43 E9/L (ref 1.5–4)
LYMPHOCYTES ABSOLUTE: 0.79 E9/L (ref 1.5–4)
LYMPHOCYTES RELATIVE PERCENT: 10.5 % (ref 20–42)
LYMPHOCYTES RELATIVE PERCENT: 6.7 % (ref 20–42)
MCH RBC QN AUTO: 31.6 PG (ref 26–35)
MCH RBC QN AUTO: 31.7 PG (ref 26–35)
MCHC RBC AUTO-ENTMCNC: 32.1 % (ref 32–34.5)
MCHC RBC AUTO-ENTMCNC: 33 % (ref 32–34.5)
MCV RBC AUTO: 95.7 FL (ref 80–99.9)
MCV RBC AUTO: 98.6 FL (ref 80–99.9)
METER GLUCOSE: 87 MG/DL (ref 74–99)
MONOCYTES ABSOLUTE: 0.43 E9/L (ref 0.1–0.95)
MONOCYTES ABSOLUTE: 0.49 E9/L (ref 0.1–0.95)
MONOCYTES RELATIVE PERCENT: 6.5 % (ref 2–12)
MONOCYTES RELATIVE PERCENT: 6.7 % (ref 2–12)
MYCOPLASMA PNEUMONIAE BY PCR: NOT DETECTED
NEUTROPHILS ABSOLUTE: 5.38 E9/L (ref 1.8–7.3)
NEUTROPHILS ABSOLUTE: 6.16 E9/L (ref 1.8–7.3)
NEUTROPHILS RELATIVE PERCENT: 81.6 % (ref 43–80)
NEUTROPHILS RELATIVE PERCENT: 84.2 % (ref 43–80)
OVALOCYTES: ABNORMAL
PARAINFLUENZA VIRUS 1 BY PCR: NOT DETECTED
PARAINFLUENZA VIRUS 2 BY PCR: NOT DETECTED
PARAINFLUENZA VIRUS 3 BY PCR: NOT DETECTED
PARAINFLUENZA VIRUS 4 BY PCR: NOT DETECTED
PDW BLD-RTO: 14.1 FL (ref 11.5–15)
PDW BLD-RTO: 14.5 FL (ref 11.5–15)
PLATELET # BLD: 123 E9/L (ref 130–450)
PLATELET # BLD: 161 E9/L (ref 130–450)
PMV BLD AUTO: 10.5 FL (ref 7–12)
PMV BLD AUTO: 11.1 FL (ref 7–12)
POIKILOCYTES: ABNORMAL
POTASSIUM REFLEX MAGNESIUM: 4.3 MMOL/L (ref 3.5–5)
POTASSIUM SERPL-SCNC: 4.9 MMOL/L (ref 3.5–5)
PROTHROMBIN TIME: 12.3 SEC (ref 9.3–12.4)
RBC # BLD: 3.99 E12/L (ref 3.5–5.5)
RBC # BLD: 4.26 E12/L (ref 3.5–5.5)
RESPIRATORY SYNCYTIAL VIRUS BY PCR: NOT DETECTED
SARS-COV-2, PCR: NOT DETECTED
SODIUM BLD-SCNC: 142 MMOL/L (ref 132–146)
SODIUM BLD-SCNC: 142 MMOL/L (ref 132–146)
TOTAL PROTEIN: 6.1 G/DL (ref 6.4–8.3)
TOTAL PROTEIN: 6.9 G/DL (ref 6.4–8.3)
TROPONIN: <0.01 NG/ML (ref 0–0.03)
TSH SERPL DL<=0.05 MIU/L-ACNC: 1.22 UIU/ML (ref 0.27–4.2)
WBC # BLD: 6.4 E9/L (ref 4.5–11.5)
WBC # BLD: 7.6 E9/L (ref 4.5–11.5)

## 2020-01-01 PROCEDURE — 2060000000 HC ICU INTERMEDIATE R&B

## 2020-01-01 PROCEDURE — 80053 COMPREHEN METABOLIC PANEL: CPT

## 2020-01-01 PROCEDURE — 97535 SELF CARE MNGMENT TRAINING: CPT

## 2020-01-01 PROCEDURE — 6360000002 HC RX W HCPCS: Performed by: SURGERY

## 2020-01-01 PROCEDURE — 99214 OFFICE O/P EST MOD 30 MIN: CPT | Performed by: SURGERY

## 2020-01-01 PROCEDURE — 2580000003 HC RX 258: Performed by: STUDENT IN AN ORGANIZED HEALTH CARE EDUCATION/TRAINING PROGRAM

## 2020-01-01 PROCEDURE — 36415 COLL VENOUS BLD VENIPUNCTURE: CPT

## 2020-01-01 PROCEDURE — 99285 EMERGENCY DEPT VISIT HI MDM: CPT

## 2020-01-01 PROCEDURE — 85025 COMPLETE CBC W/AUTO DIFF WBC: CPT

## 2020-01-01 PROCEDURE — 72110 X-RAY EXAM L-2 SPINE 4/>VWS: CPT

## 2020-01-01 PROCEDURE — 97161 PT EVAL LOW COMPLEX 20 MIN: CPT

## 2020-01-01 PROCEDURE — 93010 ELECTROCARDIOGRAM REPORT: CPT | Performed by: INTERNAL MEDICINE

## 2020-01-01 PROCEDURE — 6370000000 HC RX 637 (ALT 250 FOR IP): Performed by: STUDENT IN AN ORGANIZED HEALTH CARE EDUCATION/TRAINING PROGRAM

## 2020-01-01 PROCEDURE — 97530 THERAPEUTIC ACTIVITIES: CPT

## 2020-01-01 PROCEDURE — 6360000002 HC RX W HCPCS: Performed by: STUDENT IN AN ORGANIZED HEALTH CARE EDUCATION/TRAINING PROGRAM

## 2020-01-01 PROCEDURE — 73502 X-RAY EXAM HIP UNI 2-3 VIEWS: CPT

## 2020-01-01 PROCEDURE — 1090F PRES/ABSN URINE INCON ASSESS: CPT | Performed by: SURGERY

## 2020-01-01 PROCEDURE — 70450 CT HEAD/BRAIN W/O DYE: CPT

## 2020-01-01 PROCEDURE — 72072 X-RAY EXAM THORAC SPINE 3VWS: CPT

## 2020-01-01 PROCEDURE — 6370000000 HC RX 637 (ALT 250 FOR IP): Performed by: NURSE PRACTITIONER

## 2020-01-01 PROCEDURE — G8484 FLU IMMUNIZE NO ADMIN: HCPCS | Performed by: SURGERY

## 2020-01-01 PROCEDURE — 99232 SBSQ HOSP IP/OBS MODERATE 35: CPT | Performed by: SURGERY

## 2020-01-01 PROCEDURE — 85730 THROMBOPLASTIN TIME PARTIAL: CPT

## 2020-01-01 PROCEDURE — 6370000000 HC RX 637 (ALT 250 FOR IP): Performed by: SURGERY

## 2020-01-01 PROCEDURE — 73030 X-RAY EXAM OF SHOULDER: CPT

## 2020-01-01 PROCEDURE — 93306 TTE W/DOPPLER COMPLETE: CPT

## 2020-01-01 PROCEDURE — 72128 CT CHEST SPINE W/O DYE: CPT

## 2020-01-01 PROCEDURE — 85610 PROTHROMBIN TIME: CPT

## 2020-01-01 PROCEDURE — 4040F PNEUMOC VAC/ADMIN/RCVD: CPT | Performed by: SURGERY

## 2020-01-01 PROCEDURE — G8427 DOCREV CUR MEDS BY ELIG CLIN: HCPCS | Performed by: SURGERY

## 2020-01-01 PROCEDURE — 82962 GLUCOSE BLOOD TEST: CPT

## 2020-01-01 PROCEDURE — 84484 ASSAY OF TROPONIN QUANT: CPT

## 2020-01-01 PROCEDURE — 72131 CT LUMBAR SPINE W/O DYE: CPT

## 2020-01-01 PROCEDURE — 72125 CT NECK SPINE W/O DYE: CPT

## 2020-01-01 PROCEDURE — 97165 OT EVAL LOW COMPLEX 30 MIN: CPT

## 2020-01-01 PROCEDURE — 1111F DSCHRG MED/CURRENT MED MERGE: CPT | Performed by: SURGERY

## 2020-01-01 PROCEDURE — 99239 HOSP IP/OBS DSCHRG MGMT >30: CPT | Performed by: SURGERY

## 2020-01-01 PROCEDURE — 99222 1ST HOSP IP/OBS MODERATE 55: CPT | Performed by: SURGERY

## 2020-01-01 PROCEDURE — 1036F TOBACCO NON-USER: CPT | Performed by: SURGERY

## 2020-01-01 PROCEDURE — 1123F ACP DISCUSS/DSCN MKR DOCD: CPT | Performed by: SURGERY

## 2020-01-01 PROCEDURE — 93005 ELECTROCARDIOGRAM TRACING: CPT | Performed by: PHYSICIAN ASSISTANT

## 2020-01-01 PROCEDURE — 84443 ASSAY THYROID STIM HORMONE: CPT

## 2020-01-01 PROCEDURE — 93005 ELECTROCARDIOGRAM TRACING: CPT | Performed by: STUDENT IN AN ORGANIZED HEALTH CARE EDUCATION/TRAINING PROGRAM

## 2020-01-01 PROCEDURE — 71046 X-RAY EXAM CHEST 2 VIEWS: CPT

## 2020-01-01 PROCEDURE — 99222 1ST HOSP IP/OBS MODERATE 55: CPT | Performed by: NEUROLOGICAL SURGERY

## 2020-01-01 PROCEDURE — 0202U NFCT DS 22 TRGT SARS-COV-2: CPT

## 2020-01-01 PROCEDURE — G8420 CALC BMI NORM PARAMETERS: HCPCS | Performed by: SURGERY

## 2020-01-01 RX ORDER — DOCUSATE SODIUM 100 MG/1
100 CAPSULE, LIQUID FILLED ORAL DAILY PRN
COMMUNITY

## 2020-01-01 RX ORDER — LOSARTAN POTASSIUM 25 MG/1
25 TABLET ORAL DAILY
Status: DISCONTINUED | OUTPATIENT
Start: 2020-01-01 | End: 2020-01-01 | Stop reason: HOSPADM

## 2020-01-01 RX ORDER — POLYETHYLENE GLYCOL 3350 17 G/17G
17 POWDER, FOR SOLUTION ORAL DAILY
Status: DISCONTINUED | OUTPATIENT
Start: 2020-01-01 | End: 2020-01-01 | Stop reason: HOSPADM

## 2020-01-01 RX ORDER — IRBESARTAN 300 MG/1
300 TABLET ORAL DAILY
COMMUNITY

## 2020-01-01 RX ORDER — GABAPENTIN 100 MG/1
100 CAPSULE ORAL 3 TIMES DAILY
Status: DISCONTINUED | OUTPATIENT
Start: 2020-01-01 | End: 2020-01-01 | Stop reason: HOSPADM

## 2020-01-01 RX ORDER — LIDOCAINE 4 G/G
1 PATCH TOPICAL DAILY
Qty: 1 BOX | Refills: 0 | Status: SHIPPED | OUTPATIENT
Start: 2020-01-01 | End: 2020-01-01

## 2020-01-01 RX ORDER — BISACODYL 10 MG
10 SUPPOSITORY, RECTAL RECTAL ONCE
Status: COMPLETED | OUTPATIENT
Start: 2020-01-01 | End: 2020-01-01

## 2020-01-01 RX ORDER — PROMETHAZINE HYDROCHLORIDE 25 MG/1
12.5 TABLET ORAL EVERY 6 HOURS PRN
Status: DISCONTINUED | OUTPATIENT
Start: 2020-01-01 | End: 2020-01-01 | Stop reason: HOSPADM

## 2020-01-01 RX ORDER — FERROUS SULFATE 325(65) MG
325 TABLET ORAL
COMMUNITY
End: 2021-01-01

## 2020-01-01 RX ORDER — GABAPENTIN 100 MG/1
100 CAPSULE ORAL 3 TIMES DAILY
Qty: 90 CAPSULE | Refills: 0 | Status: SHIPPED | OUTPATIENT
Start: 2020-01-01 | End: 2021-01-01

## 2020-01-01 RX ORDER — HEPARIN SODIUM 10000 [USP'U]/ML
5000 INJECTION, SOLUTION INTRAVENOUS; SUBCUTANEOUS EVERY 8 HOURS SCHEDULED
Status: DISCONTINUED | OUTPATIENT
Start: 2020-01-01 | End: 2020-01-01 | Stop reason: HOSPADM

## 2020-01-01 RX ORDER — PANTOPRAZOLE SODIUM 40 MG/1
40 TABLET, DELAYED RELEASE ORAL DAILY
COMMUNITY

## 2020-01-01 RX ORDER — POLYVINYL ALCOHOL 14 MG/ML
1 SOLUTION/ DROPS OPHTHALMIC 3 TIMES DAILY
Status: DISCONTINUED | OUTPATIENT
Start: 2020-01-01 | End: 2020-01-01 | Stop reason: HOSPADM

## 2020-01-01 RX ORDER — SODIUM CHLORIDE, SODIUM LACTATE, POTASSIUM CHLORIDE, CALCIUM CHLORIDE 600; 310; 30; 20 MG/100ML; MG/100ML; MG/100ML; MG/100ML
INJECTION, SOLUTION INTRAVENOUS CONTINUOUS
Status: DISCONTINUED | OUTPATIENT
Start: 2020-01-01 | End: 2020-01-01

## 2020-01-01 RX ORDER — MENTHOL 40 MG/ML
1 GEL TOPICAL 3 TIMES DAILY
COMMUNITY
End: 2021-01-01

## 2020-01-01 RX ORDER — ONDANSETRON 2 MG/ML
4 INJECTION INTRAMUSCULAR; INTRAVENOUS EVERY 6 HOURS PRN
Status: DISCONTINUED | OUTPATIENT
Start: 2020-01-01 | End: 2020-01-01 | Stop reason: HOSPADM

## 2020-01-01 RX ORDER — M-VIT,TX,IRON,MINS/CALC/FOLIC 27MG-0.4MG
1 TABLET ORAL DAILY
Status: DISCONTINUED | OUTPATIENT
Start: 2020-01-01 | End: 2020-01-01 | Stop reason: HOSPADM

## 2020-01-01 RX ORDER — LOSARTAN POTASSIUM 100 MG/1
100 TABLET ORAL DAILY
COMMUNITY
End: 2021-01-01

## 2020-01-01 RX ORDER — KETOROLAC TROMETHAMINE 30 MG/ML
15 INJECTION, SOLUTION INTRAMUSCULAR; INTRAVENOUS EVERY 6 HOURS PRN
Status: DISCONTINUED | OUTPATIENT
Start: 2020-01-01 | End: 2020-01-01 | Stop reason: HOSPADM

## 2020-01-01 RX ORDER — LIDOCAINE 4 G/G
1 PATCH TOPICAL DAILY
Status: DISCONTINUED | OUTPATIENT
Start: 2020-01-01 | End: 2020-01-01 | Stop reason: HOSPADM

## 2020-01-01 RX ORDER — PANTOPRAZOLE SODIUM 40 MG/1
40 TABLET, DELAYED RELEASE ORAL
Status: DISCONTINUED | OUTPATIENT
Start: 2020-01-01 | End: 2020-01-01 | Stop reason: HOSPADM

## 2020-01-01 RX ORDER — ACETAMINOPHEN 325 MG/1
650 TABLET ORAL EVERY 6 HOURS PRN
COMMUNITY
End: 2021-01-01

## 2020-01-01 RX ORDER — VITAMIN B COMPLEX
1000 TABLET ORAL
Status: DISCONTINUED | OUTPATIENT
Start: 2020-01-01 | End: 2020-01-01 | Stop reason: HOSPADM

## 2020-01-01 RX ORDER — ACETAMINOPHEN 325 MG/1
650 TABLET ORAL EVERY 4 HOURS
Status: DISCONTINUED | OUTPATIENT
Start: 2020-01-01 | End: 2020-01-01 | Stop reason: HOSPADM

## 2020-01-01 RX ORDER — HYDROCODONE BITARTRATE AND ACETAMINOPHEN 5; 325 MG/1; MG/1
1 TABLET ORAL ONCE
Status: COMPLETED | OUTPATIENT
Start: 2020-01-01 | End: 2020-01-01

## 2020-01-01 RX ORDER — VITAMIN E 268 MG
400 CAPSULE ORAL
Status: DISCONTINUED | OUTPATIENT
Start: 2020-01-01 | End: 2020-01-01 | Stop reason: HOSPADM

## 2020-01-01 RX ORDER — ASPIRIN 81 MG/1
81 TABLET ORAL DAILY
Status: ON HOLD | COMMUNITY
End: 2020-01-01 | Stop reason: HOSPADM

## 2020-01-01 RX ORDER — SODIUM CHLORIDE 0.9 % (FLUSH) 0.9 %
10 SYRINGE (ML) INJECTION PRN
Status: DISCONTINUED | OUTPATIENT
Start: 2020-01-01 | End: 2020-01-01 | Stop reason: HOSPADM

## 2020-01-01 RX ORDER — TRAZODONE HYDROCHLORIDE 50 MG/1
75 TABLET ORAL NIGHTLY
Status: DISCONTINUED | OUTPATIENT
Start: 2020-01-01 | End: 2020-01-01 | Stop reason: HOSPADM

## 2020-01-01 RX ORDER — LANOLIN ALCOHOL/MO/W.PET/CERES
1000 CREAM (GRAM) TOPICAL
Status: DISCONTINUED | OUTPATIENT
Start: 2020-01-01 | End: 2020-01-01 | Stop reason: HOSPADM

## 2020-01-01 RX ORDER — TETANUS AND DIPHTHERIA TOXOIDS ADSORBED 2; 2 [LF]/.5ML; [LF]/.5ML
0.5 INJECTION INTRAMUSCULAR ONCE
Status: DISCONTINUED | OUTPATIENT
Start: 2020-01-01 | End: 2020-01-01 | Stop reason: HOSPADM

## 2020-01-01 RX ORDER — GABAPENTIN 100 MG/1
100 CAPSULE ORAL 3 TIMES DAILY
Status: ON HOLD | COMMUNITY
End: 2020-01-01 | Stop reason: SDUPTHER

## 2020-01-01 RX ORDER — SODIUM CHLORIDE 0.9 % (FLUSH) 0.9 %
10 SYRINGE (ML) INJECTION EVERY 12 HOURS SCHEDULED
Status: DISCONTINUED | OUTPATIENT
Start: 2020-01-01 | End: 2020-01-01 | Stop reason: HOSPADM

## 2020-01-01 RX ORDER — TRAMADOL HYDROCHLORIDE 50 MG/1
50 TABLET ORAL DAILY
COMMUNITY

## 2020-01-01 RX ADMIN — Medication 10 ML: at 22:46

## 2020-01-01 RX ADMIN — ACETAMINOPHEN 650 MG: 325 TABLET ORAL at 05:15

## 2020-01-01 RX ADMIN — ACETAMINOPHEN 650 MG: 325 TABLET ORAL at 20:46

## 2020-01-01 RX ADMIN — POLYVINYL ALCOHOL 1 DROP: 14 SOLUTION/ DROPS OPHTHALMIC at 22:45

## 2020-01-01 RX ADMIN — ACETAMINOPHEN 650 MG: 325 TABLET ORAL at 00:02

## 2020-01-01 RX ADMIN — PANTOPRAZOLE SODIUM 40 MG: 40 TABLET, DELAYED RELEASE ORAL at 06:57

## 2020-01-01 RX ADMIN — MULTIPLE VITAMINS W/ MINERALS TAB 1 TABLET: TAB at 09:18

## 2020-01-01 RX ADMIN — Medication 400 UNITS: at 09:17

## 2020-01-01 RX ADMIN — POLYVINYL ALCOHOL 1 DROP: 14 SOLUTION/ DROPS OPHTHALMIC at 14:30

## 2020-01-01 RX ADMIN — Medication 10 ML: at 09:19

## 2020-01-01 RX ADMIN — BISACODYL 10 MG: 10 SUPPOSITORY RECTAL at 06:48

## 2020-01-01 RX ADMIN — ACETAMINOPHEN 650 MG: 325 TABLET ORAL at 20:36

## 2020-01-01 RX ADMIN — Medication 1000 UNITS: at 12:29

## 2020-01-01 RX ADMIN — SODIUM CHLORIDE, PRESERVATIVE FREE 10 ML: 5 INJECTION INTRAVENOUS at 11:42

## 2020-01-01 RX ADMIN — POLYVINYL ALCOHOL 1 DROP: 14 SOLUTION/ DROPS OPHTHALMIC at 09:19

## 2020-01-01 RX ADMIN — TRAZODONE HYDROCHLORIDE 75 MG: 50 TABLET ORAL at 22:45

## 2020-01-01 RX ADMIN — TRAZODONE HYDROCHLORIDE 75 MG: 50 TABLET ORAL at 20:37

## 2020-01-01 RX ADMIN — SODIUM CHLORIDE, PRESERVATIVE FREE 10 ML: 5 INJECTION INTRAVENOUS at 19:11

## 2020-01-01 RX ADMIN — GABAPENTIN 100 MG: 100 CAPSULE ORAL at 14:30

## 2020-01-01 RX ADMIN — LOSARTAN POTASSIUM 25 MG: 25 TABLET, FILM COATED ORAL at 12:57

## 2020-01-01 RX ADMIN — HEPARIN SODIUM 5000 UNITS: 10000 INJECTION INTRAVENOUS; SUBCUTANEOUS at 22:45

## 2020-01-01 RX ADMIN — HEPARIN SODIUM 5000 UNITS: 10000 INJECTION INTRAVENOUS; SUBCUTANEOUS at 06:09

## 2020-01-01 RX ADMIN — ACETAMINOPHEN 650 MG: 325 TABLET ORAL at 09:17

## 2020-01-01 RX ADMIN — TRAZODONE HYDROCHLORIDE 75 MG: 50 TABLET ORAL at 20:46

## 2020-01-01 RX ADMIN — ACETAMINOPHEN 650 MG: 325 TABLET ORAL at 12:13

## 2020-01-01 RX ADMIN — HEPARIN SODIUM 5000 UNITS: 10000 INJECTION INTRAVENOUS; SUBCUTANEOUS at 05:42

## 2020-01-01 RX ADMIN — HYDROCODONE BITARTRATE AND ACETAMINOPHEN 1 TABLET: 5; 325 TABLET ORAL at 20:22

## 2020-01-01 RX ADMIN — HEPARIN SODIUM 5000 UNITS: 10000 INJECTION INTRAVENOUS; SUBCUTANEOUS at 14:30

## 2020-01-01 RX ADMIN — GABAPENTIN 100 MG: 100 CAPSULE ORAL at 09:18

## 2020-01-01 RX ADMIN — GABAPENTIN 100 MG: 100 CAPSULE ORAL at 14:43

## 2020-01-01 RX ADMIN — PANTOPRAZOLE SODIUM 40 MG: 40 TABLET, DELAYED RELEASE ORAL at 06:09

## 2020-01-01 RX ADMIN — MULTIPLE VITAMINS W/ MINERALS TAB 1 TABLET: TAB at 09:17

## 2020-01-01 RX ADMIN — HEPARIN SODIUM 5000 UNITS: 10000 INJECTION INTRAVENOUS; SUBCUTANEOUS at 20:46

## 2020-01-01 RX ADMIN — Medication 10 ML: at 09:18

## 2020-01-01 RX ADMIN — POLYVINYL ALCOHOL 1 DROP: 14 SOLUTION/ DROPS OPHTHALMIC at 09:18

## 2020-01-01 RX ADMIN — KETOROLAC TROMETHAMINE 15 MG: 30 INJECTION, SOLUTION INTRAMUSCULAR; INTRAVENOUS at 11:41

## 2020-01-01 RX ADMIN — POLYETHYLENE GLYCOL 3350 17 G: 17 POWDER, FOR SOLUTION ORAL at 09:18

## 2020-01-01 RX ADMIN — Medication 10 ML: at 20:43

## 2020-01-01 RX ADMIN — ACETAMINOPHEN 650 MG: 325 TABLET ORAL at 14:46

## 2020-01-01 RX ADMIN — Medication 1000 MCG: at 09:18

## 2020-01-01 RX ADMIN — ACETAMINOPHEN 650 MG: 325 TABLET ORAL at 12:58

## 2020-01-01 RX ADMIN — GABAPENTIN 100 MG: 100 CAPSULE ORAL at 20:37

## 2020-01-01 RX ADMIN — Medication 10 ML: at 09:43

## 2020-01-01 RX ADMIN — PANTOPRAZOLE SODIUM 40 MG: 40 TABLET, DELAYED RELEASE ORAL at 05:41

## 2020-01-01 RX ADMIN — LOSARTAN POTASSIUM 25 MG: 25 TABLET, FILM COATED ORAL at 09:18

## 2020-01-01 RX ADMIN — KETOROLAC TROMETHAMINE 15 MG: 30 INJECTION, SOLUTION INTRAMUSCULAR; INTRAVENOUS at 04:05

## 2020-01-01 RX ADMIN — HEPARIN SODIUM 5000 UNITS: 10000 INJECTION INTRAVENOUS; SUBCUTANEOUS at 14:43

## 2020-01-01 RX ADMIN — LOSARTAN POTASSIUM 25 MG: 25 TABLET, FILM COATED ORAL at 09:17

## 2020-01-01 RX ADMIN — KETOROLAC TROMETHAMINE 15 MG: 30 INJECTION, SOLUTION INTRAMUSCULAR; INTRAVENOUS at 14:47

## 2020-01-01 RX ADMIN — ACETAMINOPHEN 650 MG: 325 TABLET ORAL at 12:30

## 2020-01-01 RX ADMIN — HEPARIN SODIUM 5000 UNITS: 10000 INJECTION INTRAVENOUS; SUBCUTANEOUS at 06:56

## 2020-01-01 RX ADMIN — KETOROLAC TROMETHAMINE 15 MG: 30 INJECTION, SOLUTION INTRAMUSCULAR; INTRAVENOUS at 19:10

## 2020-01-01 RX ADMIN — POLYVINYL ALCOHOL 1 DROP: 14 SOLUTION/ DROPS OPHTHALMIC at 20:36

## 2020-01-01 RX ADMIN — ACETAMINOPHEN 650 MG: 325 TABLET ORAL at 09:41

## 2020-01-01 RX ADMIN — KETOROLAC TROMETHAMINE 15 MG: 30 INJECTION, SOLUTION INTRAMUSCULAR; INTRAVENOUS at 15:14

## 2020-01-01 RX ADMIN — HEPARIN SODIUM 5000 UNITS: 10000 INJECTION INTRAVENOUS; SUBCUTANEOUS at 20:37

## 2020-01-01 RX ADMIN — GABAPENTIN 100 MG: 100 CAPSULE ORAL at 22:45

## 2020-01-01 RX ADMIN — POLYVINYL ALCOHOL 1 DROP: 14 SOLUTION/ DROPS OPHTHALMIC at 14:47

## 2020-01-01 RX ADMIN — Medication 10 ML: at 20:47

## 2020-01-01 RX ADMIN — ACETAMINOPHEN 650 MG: 325 TABLET ORAL at 16:17

## 2020-01-01 ASSESSMENT — PAIN DESCRIPTION - LOCATION
LOCATION: BACK

## 2020-01-01 ASSESSMENT — PAIN SCALES - GENERAL
PAINLEVEL_OUTOF10: 10
PAINLEVEL_OUTOF10: 3
PAINLEVEL_OUTOF10: 9
PAINLEVEL_OUTOF10: 5
PAINLEVEL_OUTOF10: 6
PAINLEVEL_OUTOF10: 7
PAINLEVEL_OUTOF10: 0
PAINLEVEL_OUTOF10: 8
PAINLEVEL_OUTOF10: 7
PAINLEVEL_OUTOF10: 8
PAINLEVEL_OUTOF10: 7
PAINLEVEL_OUTOF10: 7
PAINLEVEL_OUTOF10: 0
PAINLEVEL_OUTOF10: 1
PAINLEVEL_OUTOF10: 8
PAINLEVEL_OUTOF10: 9
PAINLEVEL_OUTOF10: 7
PAINLEVEL_OUTOF10: 10
PAINLEVEL_OUTOF10: 7
PAINLEVEL_OUTOF10: 0
PAINLEVEL_OUTOF10: 7
PAINLEVEL_OUTOF10: 6

## 2020-01-01 ASSESSMENT — PAIN DESCRIPTION - PAIN TYPE
TYPE: CHRONIC PAIN

## 2020-01-01 ASSESSMENT — PAIN DESCRIPTION - DESCRIPTORS
DESCRIPTORS: ACHING;DISCOMFORT;PRESSURE
DESCRIPTORS: ACHING
DESCRIPTORS: ACHING;DISCOMFORT;SORE
DESCRIPTORS: ACHING

## 2020-01-01 ASSESSMENT — PAIN DESCRIPTION - ORIENTATION
ORIENTATION: OUTER;MID
ORIENTATION: MID;LOWER;UPPER
ORIENTATION: LOWER;MID
ORIENTATION: LOWER
ORIENTATION: LOWER;MID

## 2020-01-01 ASSESSMENT — PAIN DESCRIPTION - FREQUENCY
FREQUENCY: INTERMITTENT
FREQUENCY: INTERMITTENT

## 2020-01-10 ENCOUNTER — HOSPITAL ENCOUNTER (OUTPATIENT)
Dept: SPEECH THERAPY | Age: 85
Setting detail: THERAPIES SERIES
Discharge: HOME | End: 2020-01-10
Payer: MEDICARE

## 2020-01-10 PROCEDURE — 97129 THER IVNTJ 1ST 15 MIN: CPT

## 2020-01-10 PROCEDURE — 97130 THER IVNTJ EA ADDL 15 MIN: CPT

## 2020-01-10 NOTE — PROGRESS NOTES
Ms. Eddie Nathan was seen for cognitive therapy today. Therapy targeted:    · Organizing functional information into memory pegs was targeted. Patient created memory pegs with 100% accuracy. She listed items under the pegs with 100% accuracy. Increased time was needed. Immediate memory of the information when the memory pegs were provided demonstrated 88% recall. After a 10 minute delay, she recalled with 82% accuracy. · Patient read paragraphs and used memory pegs to chunk information. She then used those pegs to recall the information. Maximal assistance was needed to complete the pegs. She demonstrated 71% accuracy for recall. Homework was provided in order to aid in carryover. Continue plan of care. Treatment plan and goals can be found in the progress report.

## 2020-01-17 ENCOUNTER — APPOINTMENT (OUTPATIENT)
Dept: SPEECH THERAPY | Age: 85
End: 2020-01-17
Payer: MEDICARE

## 2020-01-19 ENCOUNTER — APPOINTMENT (OUTPATIENT)
Dept: GENERAL RADIOLOGY | Age: 85
End: 2020-01-19
Payer: MEDICARE

## 2020-01-19 ENCOUNTER — APPOINTMENT (OUTPATIENT)
Dept: CT IMAGING | Age: 85
End: 2020-01-19
Payer: MEDICARE

## 2020-01-19 ENCOUNTER — HOSPITAL ENCOUNTER (EMERGENCY)
Age: 85
Discharge: HOME | End: 2020-01-19
Attending: EMERGENCY MEDICINE
Payer: MEDICARE

## 2020-01-19 VITALS
RESPIRATION RATE: 16 BRPM | SYSTOLIC BLOOD PRESSURE: 146 MMHG | HEIGHT: 60 IN | TEMPERATURE: 98.7 F | BODY MASS INDEX: 24.94 KG/M2 | OXYGEN SATURATION: 97 % | DIASTOLIC BLOOD PRESSURE: 70 MMHG | HEART RATE: 87 BPM | WEIGHT: 127 LBS

## 2020-01-19 LAB
ANION GAP SERPL CALCULATED.3IONS-SCNC: 8 MMOL/L (ref 7–16)
BASOPHILS ABSOLUTE: 0.03 E9/L (ref 0–0.2)
BASOPHILS RELATIVE PERCENT: 0.8 % (ref 0–2)
BUN BLDV-MCNC: 16 MG/DL (ref 8–23)
CALCIUM SERPL-MCNC: 9.3 MG/DL (ref 8.6–10.2)
CHLORIDE BLD-SCNC: 104 MMOL/L (ref 98–107)
CO2: 30 MMOL/L (ref 22–29)
CREAT SERPL-MCNC: 0.8 MG/DL (ref 0.5–1)
EKG ATRIAL RATE: 88 BPM
EKG P AXIS: 50 DEGREES
EKG P-R INTERVAL: 186 MS
EKG Q-T INTERVAL: 354 MS
EKG QRS DURATION: 94 MS
EKG QTC CALCULATION (BAZETT): 428 MS
EKG R AXIS: -4 DEGREES
EKG T AXIS: 24 DEGREES
EKG VENTRICULAR RATE: 88 BPM
EOSINOPHILS ABSOLUTE: 0.06 E9/L (ref 0.05–0.5)
EOSINOPHILS RELATIVE PERCENT: 1.5 % (ref 0–6)
GFR AFRICAN AMERICAN: >60
GFR NON-AFRICAN AMERICAN: >60 ML/MIN/1.73
GLUCOSE BLD-MCNC: 130 MG/DL (ref 74–99)
HCT VFR BLD CALC: 39.6 % (ref 34–48)
HEMOGLOBIN: 12.4 G/DL (ref 11.5–15.5)
IMMATURE GRANULOCYTES #: 0.01 E9/L
IMMATURE GRANULOCYTES %: 0.3 % (ref 0–5)
LYMPHOCYTES ABSOLUTE: 0.72 E9/L (ref 1.5–4)
LYMPHOCYTES RELATIVE PERCENT: 18 % (ref 20–42)
MCH RBC QN AUTO: 31.4 PG (ref 26–35)
MCHC RBC AUTO-ENTMCNC: 31.3 % (ref 32–34.5)
MCV RBC AUTO: 100.3 FL (ref 80–99.9)
MONOCYTES ABSOLUTE: 0.43 E9/L (ref 0.1–0.95)
MONOCYTES RELATIVE PERCENT: 10.8 % (ref 2–12)
NEUTROPHILS ABSOLUTE: 2.74 E9/L (ref 1.8–7.3)
NEUTROPHILS RELATIVE PERCENT: 68.6 % (ref 43–80)
PDW BLD-RTO: 13.8 FL (ref 11.5–15)
PLATELET # BLD: 148 E9/L (ref 130–450)
PMV BLD AUTO: 10.8 FL (ref 7–12)
POTASSIUM REFLEX MAGNESIUM: 3.7 MMOL/L (ref 3.5–5)
RBC # BLD: 3.95 E12/L (ref 3.5–5.5)
SODIUM BLD-SCNC: 142 MMOL/L (ref 132–146)
TROPONIN: <0.01 NG/ML (ref 0–0.03)
WBC # BLD: 4 E9/L (ref 4.5–11.5)

## 2020-01-19 PROCEDURE — 99285 EMERGENCY DEPT VISIT HI MDM: CPT

## 2020-01-19 PROCEDURE — 73060 X-RAY EXAM OF HUMERUS: CPT

## 2020-01-19 PROCEDURE — 72125 CT NECK SPINE W/O DYE: CPT

## 2020-01-19 PROCEDURE — 93010 ELECTROCARDIOGRAM REPORT: CPT | Performed by: INTERNAL MEDICINE

## 2020-01-19 PROCEDURE — 2580000003 HC RX 258: Performed by: STUDENT IN AN ORGANIZED HEALTH CARE EDUCATION/TRAINING PROGRAM

## 2020-01-19 PROCEDURE — 73030 X-RAY EXAM OF SHOULDER: CPT

## 2020-01-19 PROCEDURE — 93005 ELECTROCARDIOGRAM TRACING: CPT | Performed by: STUDENT IN AN ORGANIZED HEALTH CARE EDUCATION/TRAINING PROGRAM

## 2020-01-19 PROCEDURE — 6370000000 HC RX 637 (ALT 250 FOR IP): Performed by: STUDENT IN AN ORGANIZED HEALTH CARE EDUCATION/TRAINING PROGRAM

## 2020-01-19 PROCEDURE — 70450 CT HEAD/BRAIN W/O DYE: CPT

## 2020-01-19 PROCEDURE — 84484 ASSAY OF TROPONIN QUANT: CPT

## 2020-01-19 PROCEDURE — 71045 X-RAY EXAM CHEST 1 VIEW: CPT

## 2020-01-19 PROCEDURE — 85025 COMPLETE CBC W/AUTO DIFF WBC: CPT

## 2020-01-19 PROCEDURE — 80048 BASIC METABOLIC PNL TOTAL CA: CPT

## 2020-01-19 PROCEDURE — 96372 THER/PROPH/DIAG INJ SC/IM: CPT

## 2020-01-19 PROCEDURE — 96374 THER/PROPH/DIAG INJ IV PUSH: CPT

## 2020-01-19 PROCEDURE — 6360000002 HC RX W HCPCS: Performed by: STUDENT IN AN ORGANIZED HEALTH CARE EDUCATION/TRAINING PROGRAM

## 2020-01-19 RX ORDER — ORPHENADRINE CITRATE 30 MG/ML
60 INJECTION INTRAMUSCULAR; INTRAVENOUS ONCE
Status: COMPLETED | OUTPATIENT
Start: 2020-01-19 | End: 2020-01-19

## 2020-01-19 RX ORDER — LIDOCAINE 50 MG/G
1 PATCH TOPICAL DAILY
Qty: 10 PATCH | Refills: 0 | Status: SHIPPED | OUTPATIENT
Start: 2020-01-19 | End: 2020-01-29

## 2020-01-19 RX ORDER — 0.9 % SODIUM CHLORIDE 0.9 %
1000 INTRAVENOUS SOLUTION INTRAVENOUS ONCE
Status: COMPLETED | OUTPATIENT
Start: 2020-01-19 | End: 2020-01-19

## 2020-01-19 RX ORDER — ORPHENADRINE CITRATE 100 MG/1
100 TABLET, EXTENDED RELEASE ORAL 2 TIMES DAILY
Qty: 20 TABLET | Refills: 0 | Status: SHIPPED | OUTPATIENT
Start: 2020-01-19 | End: 2020-01-29

## 2020-01-19 RX ORDER — KETOROLAC TROMETHAMINE 30 MG/ML
15 INJECTION, SOLUTION INTRAMUSCULAR; INTRAVENOUS ONCE
Status: COMPLETED | OUTPATIENT
Start: 2020-01-19 | End: 2020-01-19

## 2020-01-19 RX ORDER — LIDOCAINE 4 G/G
1 PATCH TOPICAL DAILY
Status: DISCONTINUED | OUTPATIENT
Start: 2020-01-19 | End: 2020-01-19 | Stop reason: HOSPADM

## 2020-01-19 RX ORDER — NAPROXEN 250 MG/1
250 TABLET ORAL 2 TIMES DAILY WITH MEALS
Qty: 60 TABLET | Refills: 0 | Status: SHIPPED | OUTPATIENT
Start: 2020-01-19 | End: 2020-01-01

## 2020-01-19 RX ADMIN — ORPHENADRINE CITRATE 60 MG: 30 INJECTION INTRAMUSCULAR; INTRAVENOUS at 14:08

## 2020-01-19 RX ADMIN — SODIUM CHLORIDE 1000 ML: 9 INJECTION, SOLUTION INTRAVENOUS at 12:07

## 2020-01-19 RX ADMIN — KETOROLAC TROMETHAMINE 15 MG: 30 INJECTION, SOLUTION INTRAMUSCULAR; INTRAVENOUS at 12:09

## 2020-01-19 ASSESSMENT — PAIN SCALES - GENERAL
PAINLEVEL_OUTOF10: 8
PAINLEVEL_OUTOF10: 4

## 2020-01-19 ASSESSMENT — PAIN DESCRIPTION - PAIN TYPE: TYPE: ACUTE PAIN

## 2020-01-19 ASSESSMENT — ENCOUNTER SYMPTOMS
ABDOMINAL PAIN: 0
BACK PAIN: 0
DIARRHEA: 0
NAUSEA: 0
TROUBLE SWALLOWING: 0
VOMITING: 0
COUGH: 0
PHOTOPHOBIA: 0
SHORTNESS OF BREATH: 0

## 2020-01-19 ASSESSMENT — PAIN DESCRIPTION - ORIENTATION: ORIENTATION: LEFT

## 2020-01-19 ASSESSMENT — PAIN - FUNCTIONAL ASSESSMENT: PAIN_FUNCTIONAL_ASSESSMENT: PREVENTS OR INTERFERES WITH ALL ACTIVE AND SOME PASSIVE ACTIVITIES

## 2020-01-19 NOTE — ED PROVIDER NOTES
The patient is an 80-year-old female who presents to the emergency department after experiencing a fall. Patient states yesterday she was sitting on the couch and she got up to get something off of the TV stand. When she bent forward to  something she got lightheaded and dizzy. Patient states that she then fell and hit her head off of the table. Patient states that she was able to immediately pull herself up using the couch and did not spend time on the floor. Patient states this is happened multiple times in the past.  She is complaining of pain in her head, neck, and left shoulder. Patient did not take anything for symptoms. She states that she does not experience any loss of consciousness. She is not on any anticoagulation. She denies any other injuries, chest pain, shortness of breath abdominal pain, nausea, vomiting, diarrhea, blurry vision, double vision, numbness, tingling, syncope, recent hospitalization, recent illness, or other acute symptoms or concerns. The history is provided by the patient. Fall   The accident occurred yesterday. The fall occurred while standing. She fell from a height of 1 to 2 ft. Impact surface: a hard surface. There was no blood loss. The point of impact was the head. The pain is present in the head. The pain is at a severity of 4/10. The pain is mild. She was ambulatory at the scene. Associated symptoms include headaches. Pertinent negatives include no fever, no numbness, no abdominal pain, no nausea, no vomiting, no hematuria and no loss of consciousness. She has tried nothing for the symptoms. Review of Systems   Constitutional: Negative for chills, fatigue and fever. HENT: Negative for congestion and trouble swallowing. Eyes: Negative for photophobia and visual disturbance. Respiratory: Negative for cough and shortness of breath. Cardiovascular: Negative for chest pain and leg swelling.    Gastrointestinal: Negative for abdominal pain, diarrhea, nausea and vomiting. Genitourinary: Negative for dysuria, flank pain and hematuria. Musculoskeletal: Negative for back pain and neck pain. Skin: Negative for rash and wound. Neurological: Positive for dizziness, light-headedness and headaches. Negative for loss of consciousness, syncope, weakness and numbness. All other systems reviewed and are negative. Physical Exam  Vitals signs and nursing note reviewed. Constitutional:       General: She is not in acute distress. Appearance: She is well-developed. She is not ill-appearing, toxic-appearing or diaphoretic. HENT:      Head: Normocephalic and atraumatic. Nose: Nose normal.      Mouth/Throat:      Lips: Pink. No lesions. Mouth: Mucous membranes are moist.   Eyes:      General: Lids are normal.      Pupils: Pupils are equal, round, and reactive to light. Neck:      Musculoskeletal: Normal range of motion and neck supple. Spinous process tenderness and muscular tenderness (Mild tenderness palpation over the spinous process and paraspinal musculature of the cervical spine. No palpable step-offs or obvious deformities present.) present. Cardiovascular:      Rate and Rhythm: Normal rate and regular rhythm. Heart sounds: Normal heart sounds, S1 normal and S2 normal. No murmur. Pulmonary:      Effort: Pulmonary effort is normal. No respiratory distress. Breath sounds: Normal breath sounds and air entry. No decreased breath sounds, wheezing, rhonchi or rales. Abdominal:      General: Bowel sounds are normal.      Palpations: Abdomen is soft. Tenderness: There is no tenderness. There is no guarding or rebound. Skin:     General: Skin is warm and dry. Neurological:      General: No focal deficit present. Mental Status: She is alert and oriented to person, place, and time. GCS: GCS eye subscore is 4. GCS verbal subscore is 5. GCS motor subscore is 6.       Cranial Nerves: No dysarthria or facial asymmetry. Sensory: Sensation is intact. No sensory deficit. Motor: Motor function is intact. No weakness, tremor, atrophy or abnormal muscle tone. Coordination: Coordination normal.          Procedures     MDM  Number of Diagnoses or Management Options  Dizziness:   Fall, initial encounter:   Diagnosis management comments: The patient is an 78-year-old female who presents to the emergency department after experiencing a fall and dizziness. She is hemodynamically stable, nontoxic in appearance, and in no acute distress. Differential diagnosis includes but is not limited to orthostatic hypotension versus vertigo versus electrolyte abnormality versus intracranial hemorrhage. We will proceed with basic labs, CT head, CT cervical spine, chest x-ray, treat the patient with 1 L of IV fluids, Toradol, and reevaluate. Electrolytes are within normal limits, troponin negative, no leukocytosis, no anemia, x-ray does not show evidence of acute fracture but there is osteoarthritis of the left AC joint and calcific tendinitis, chest x-ray does not show evidence of acute abnormalities, CT head shows chronic changes any acute evidence of intracranial hemorrhage or other abnormal intracranial findings, CT cervical spine shows slight compression deformities of C6 and C7 without acute fractures which most likely secondary to old injuries and degenerative changes present. Orthostatics were negative. Patient was given Norflex and a lidocaine patch. She is feeling much better. Recommended her to follow-up with her family doctor and continue to use her walker at home. She is to return here for worsening symptoms or other acute symptoms or concerns. Patient verbalized understanding and agreement to treatment plan and discharge instructions. ED Course as of Jan 19 1417   Sun Jan 19, 2020   1343 Reevaluated patient. She is resting comfortably and in no acute distress. Cervical spine was cleared.   She is still use drugs. Family History: family history includes Cancer in her brother and brother; Early Death in her brother; Heart Disease in her sister. The patients home medications have been reviewed.     Allergies: Codeine and Vicodin [hydrocodone-acetaminophen]    -------------------------------------------------- RESULTS -------------------------------------------------  Labs:  Results for orders placed or performed during the hospital encounter of 01/19/20   CBC auto differential   Result Value Ref Range    WBC 4.0 (L) 4.5 - 11.5 E9/L    RBC 3.95 3.50 - 5.50 E12/L    Hemoglobin 12.4 11.5 - 15.5 g/dL    Hematocrit 39.6 34.0 - 48.0 %    .3 (H) 80.0 - 99.9 fL    MCH 31.4 26.0 - 35.0 pg    MCHC 31.3 (L) 32.0 - 34.5 %    RDW 13.8 11.5 - 15.0 fL    Platelets 256 737 - 874 E9/L    MPV 10.8 7.0 - 12.0 fL    Neutrophils % 68.6 43.0 - 80.0 %    Immature Granulocytes % 0.3 0.0 - 5.0 %    Lymphocytes % 18.0 (L) 20.0 - 42.0 %    Monocytes % 10.8 2.0 - 12.0 %    Eosinophils % 1.5 0.0 - 6.0 %    Basophils % 0.8 0.0 - 2.0 %    Neutrophils Absolute 2.74 1.80 - 7.30 E9/L    Immature Granulocytes # 0.01 E9/L    Lymphocytes Absolute 0.72 (L) 1.50 - 4.00 E9/L    Monocytes Absolute 0.43 0.10 - 0.95 E9/L    Eosinophils Absolute 0.06 0.05 - 0.50 E9/L    Basophils Absolute 0.03 0.00 - 0.20 P5/W   Basic Metabolic Panel w/ Reflex to MG   Result Value Ref Range    Sodium 142 132 - 146 mmol/L    Potassium reflex Magnesium 3.7 3.5 - 5.0 mmol/L    Chloride 104 98 - 107 mmol/L    CO2 30 (H) 22 - 29 mmol/L    Anion Gap 8 7 - 16 mmol/L    Glucose 130 (H) 74 - 99 mg/dL    BUN 16 8 - 23 mg/dL    CREATININE 0.8 0.5 - 1.0 mg/dL    GFR Non-African American >60 >=60 mL/min/1.73    GFR African American >60     Calcium 9.3 8.6 - 10.2 mg/dL   Troponin   Result Value Ref Range    Troponin <0.01 0.00 - 0.03 ng/mL   EKG 12 Lead   Result Value Ref Range    Ventricular Rate 88 BPM    Atrial Rate 88 BPM    P-R Interval 186 ms    QRS Duration 94 ms    Q-T Interval 354 ms    QTc Calculation (Bazett) 428 ms    P Axis 50 degrees    R Axis -4 degrees    T Axis 24 degrees       Radiology:  XR SHOULDER LEFT (MIN 2 VIEWS)   Final Result   No acute fracture or dislocation. Osteoarthritis of the left AC joint. Calcific tendinitis. Bony demineralization. XR HUMERUS LEFT (MIN 2 VIEWS)   Final Result   No acute fracture or dislocation. Osteoarthritis of the left AC joint. Calcific tendinitis. Bony demineralization. XR CHEST 1 VW   Final Result   Cardiomegaly. No new abnormal findings. CT Head WO Contrast   Final Result   Extensive atrophy and chronic microvascular ischemic disease. CT Cervical Spine WO Contrast   Final Result   Slight compression deformities of C6 and C7 which lack acute features   and may be old injuries. Degenerative spondylotic changes. ------------------------- NURSING NOTES AND VITALS REVIEWED ---------------------------  Date / Time Roomed:  1/19/2020 11:33 AM  ED Bed Assignment:  13/13    The nursing notes within the ED encounter and vital signs as below have been reviewed. BP (!) 146/70   Pulse 87   Temp 98.7 °F (37.1 °C)   Resp 16   Ht 5' (1.524 m)   Wt 127 lb (57.6 kg)   SpO2 97%   BMI 24.80 kg/m²   Oxygen Saturation Interpretation: Normal      ------------------------------------------ PROGRESS NOTES ------------------------------------------  2:18 PM  I have spoken with the patient and discussed todays results, in addition to providing specific details for the plan of care and counseling regarding the diagnosis and prognosis. Their questions are answered at this time and they are agreeable with the plan. I discussed at length with them reasons for immediate return here for re evaluation. They will followup with their primary care physician by calling their office tomorrow.       --------------------------------- ADDITIONAL PROVIDER NOTES ---------------------------------  At this time the patient is without objective evidence of an acute process requiring hospitalization or inpatient management. They have remained hemodynamically stable throughout their entire ED visit and are stable for discharge with outpatient follow-up. The plan has been discussed in detail and they are aware of the specific conditions for emergent return, as well as the importance of follow-up. New Prescriptions    LIDOCAINE (LIDODERM) 5 %    Place 1 patch onto the skin daily for 10 days 12 hours on, 12 hours off. NAPROXEN (NAPROSYN) 250 MG TABLET    Take 1 tablet by mouth 2 times daily (with meals)    ORPHENADRINE (NORFLEX) 100 MG EXTENDED RELEASE TABLET    Take 1 tablet by mouth 2 times daily for 10 days       Diagnosis:  1. Fall, initial encounter    2. Dizziness        Disposition:  Patient's disposition: Discharge to home  Patient's condition is stable.        Wilfredo Reid DO  Resident  01/19/20 5516

## 2020-01-24 ENCOUNTER — APPOINTMENT (OUTPATIENT)
Dept: SPEECH THERAPY | Age: 85
End: 2020-01-24
Payer: MEDICARE

## 2020-01-31 ENCOUNTER — APPOINTMENT (OUTPATIENT)
Dept: SPEECH THERAPY | Age: 85
End: 2020-01-31
Payer: MEDICARE

## 2020-02-07 ENCOUNTER — APPOINTMENT (OUTPATIENT)
Dept: SPEECH THERAPY | Age: 85
End: 2020-02-07
Payer: MEDICARE

## 2020-02-14 ENCOUNTER — HOSPITAL ENCOUNTER (OUTPATIENT)
Dept: SPEECH THERAPY | Age: 85
Setting detail: THERAPIES SERIES
Discharge: HOME | End: 2020-02-14
Payer: MEDICARE

## 2020-02-14 PROCEDURE — 97130 THER IVNTJ EA ADDL 15 MIN: CPT

## 2020-02-14 PROCEDURE — 97129 THER IVNTJ 1ST 15 MIN: CPT

## 2020-02-14 NOTE — PROGRESS NOTES
Ms. Ruddy Lopez was seen for cognitive therapy today. Therapy targeted:    · Patient was given 6 objects to recall. She was instructed to categorize the objects or associate them with one another as a strategy to improve recall. Patient demonstrated immediate recall with 92% accuracy. After a 20 minute delay, she demonstrated 46% accuracy. · Patient studied a picture scene and used verbal rehearsal as a strategy to assist with recall. Patient demonstrated immediate recall with 80% accuracy. Homework was provided in order to aid in carryover. Continue plan of care. Treatment plan and goals can be found in the progress report.

## 2020-02-14 NOTE — PROGRESS NOTES
SPEECH-LANGUAGE PATHOLOGY  PROGRESS REPORT/UPDATED PLAN OF CARE        PATIENT NAME:  Marcel Shelton        :  3/14/1933        DATE:  20    SIGNIFICANT INFORMATION:  Marcel Shelton was referred by JOBY Grace to [facility]'s Outpatient Speech Pathology Department for cognitive evaluation and treatment due to a diagnosis of early onset Alzheimer's dementia without behavioral disturbance. Patient was accompanied to the initial session on 19 by her son, Nathaniel Caputo. Patient reported that she is having difficulty with her memory. Patient's son reported that she was prescribed a medication to help with memory, but that she quit taking it. Patient lives alone in a house. Patient has 4 children. Her son, Nathaniel Caputo and daughter, Yevgeniy Plummer assist with her care. The patient's two sisters also assist with her care. The patient's daughter manages her medications and finances. Patient no longer drives. Patient is retired from [employer]. Patient completed high school. Patient reported no difficulties with learning. Patient reported intermittent periods of confusion, forgetting to take her medications, and losing her train of thought. COGNITIVE EVALUATION:  Patient was evaluated at the time of initial evaluation on 19 using the ROXIMITY. Re-assessment of cognitive skills was completed on 19.   Results of the assessment from 19 will be indicated below along with results from 19 comparative review:     [facility] Financial Assessment-Geriatric:    Subtest Raw Score   19 Raw Score  19 Percentile  19 Percentile 19 Standard Score 19 Standard Score 19 Severity  19 Severity  19              Immediate Memory 27 29 75 95 12 15 Moderate-mild WFL   Recent Memory 26 29 84 95 13 15 mild WFL   Temporal Orientation 28 29 84 91 13 14 mild WFL   Spatial Orientation 24 26 50 63 10 11 moderate moderate   Orientation to Environment 29 30 91 95 14 15 [facility] WFL   Recall of General Information 26 24 75 50 12 10 Moderate-mild moderate   Problem Solving & Abstract Reasoning 27 30 63 95 11 15 moderate WFL   Organization of Information 29 30 98 99 16 17 [facility]   Auditory Processing & Comprehension 28 28 75 75 12 12 Moderate-mild Moderate-mild   Problem Solving and  Edna Reasoning 29 29 91 91 14 14 WFL WFL   Naming Common Objects 30 30 91 91 14 14 [facility] WFL   Functional Oral Reading 30 30 91 91 14 14 WFL WFL     Composites Sum of Standard   Scores 6/7/19 Sum of Standard  Scores 12/13/19 Quotients 6/7/19 Quotients 12/13/19 Percentile   Rank 6/7/19 Percentile   Rank 12/13/19 Severity   Rating 6/7/19 Severity  Rating 12/13/19   Information Processing 127 138 119 127 90 97 Mild-WFL WFL   Memory 37 40 115 121 84 92 mild WFL   Orientation 37 40 115 121 84 92 mild WFL   Problem Solving 41 46 124 134 95 99 WFL WFL       VARIANT OBSERVATIONS:     Present 6/7/19 Present 12/13/19 Absent  6/7/19 Present   12/13/19   Error (e) x x     Perseveration (p)   x x   Repeat Instructions/Stimulus (r) x x     Denial/Refusal (d) x  x    Delayed Response (dl) x x     Confabulation (c)   x x   Partially Correct (pc) x x     Irrelevant (i)   x x   Tangential (t)   x x   Self-corrected (sc) x x         Formalized assessment of cognition was continued and completed on 7/11/19 via the Assessment of Language-Related Functional Activities (NÉSTOR). This assessment measures the patient's ability to complete functional cognitive-language activities of daily living. An independent functioning rating of 1 indicates a high probability of independent functioning on that task. A score of 2 indicates the need for some level of assistance on that task. A score of 3 indicates a high probability that the patient is not able to function independently on that task assessed. Results of the assessment will be indicated below.       SUBTEST PERCENT CORRECT INDEPENDENT FUNCTIONING RATING   1. Telling Time 90 1   2. Counting Money 90 1*   3. Addressing an Envelope 100 1   4. Daily Math Problems 80 1*   5. Writing a Check       Balancing a Checkbook 80 1*   6. Understanding Medicine Labels 60 2   7. Understanding a Calendar 100 1   8. Reading Instructions 80 1   9. Using a Telephone DNT DNT   10.  Writing Phone Messages 65 2   * very high response times and frequent self corrections. SPEECH DIAGNOSIS:  Admission:  Mild cognitive impairment    Current:  Mild-WFL cognitive impairment    RECOMMENDATIONS/TREATMENT PLAN:  Therapy was recommended one time per week for 30-60 minute sessions for an estimated 28 visits. Patient has completed 20:28 recommended visits thus far. Progress in therapy has been recorded using the following key:  NC= no change; IMP= improved; ACH= achieved; NEI= not enough information       1. Patient will improve immediate memory for functional tasks such as recording phone messages to a supervisory assistance level with greater than 90% accuracy and the use of compensatory aids. -IMP  2. Patient will improve recent memory to a supervisory assistance level with greater than 90% accuracy with the use of a memory log/calendar aid -IMP  3. Patient will improve temporal orientation (recent/remote memory) to a supervisory assistance level with greater than 90% accuracy and the use of a calendar aid -IMP  4. Patient will improve spatial orientation to a supervisory assistance level with greater than 90% accuracy and the use of a memory log-IMP   5. Patient will improve auditory processing and retention to greater than 90% -NC  6. Patient will identify and spontaneously use compensatory techniques to assist with memory with greater than 90% accuracy-IMP  7.   Patient will improve problem solving for functional activities such as financial, medication, and schedule management to a supervisory assistance level with greater than 90% accuracy and the use of compensatory aids/strategies. -IMP    UPDATED PLAN OF CARE:  Therapy is recommended to continue one time per week for 30-60 minute sessions for an estimated 28 visits. An additional 8 visits is recommended for this quarter for a total of 28 visits. Patient has completed 20:28 recommended visits thus far. 1.  Patient will improve immediate memory for functional tasks such as recording phone messages to a supervisory assistance level with greater than 90% accuracy and the use of compensatory aids. 2.  Patient will improve recent memory to a supervisory assistance level with greater than 90% accuracy with the use of a memory log/calendar aid   3. Patient will improve temporal orientation (recent/remote memory) to a supervisory assistance level with greater than 90% accuracy and the use of a calendar aid   4. Patient will improve spatial orientation to a supervisory assistance level with greater than 90% accuracy and the use of a memory log   5. Patient will improve auditory processing and retention to greater than 90%   6. Patient will identify and spontaneously use compensatory techniques to assist with memory with greater than 90% accuracy  7. Patient will improve problem solving for functional activities such as financial, medication, and schedule management to a supervisory assistance level with greater than 90% accuracy and the use of compensatory aids/strategies. SUMMARY:  Guero Watts has demonstrated improvement in response to speech therapy intervention. Continued therapy is recommended in order to address the above mentioned needs. Prognosis for continued improvement is good. Merari Monday  T: [phone]   F: [phone]     If you have any questions or concerns, please don't hesitate to call.   Thank you for your referral.    Physician/Provider Signature:________________________________Date:__________________  By signing above, the

## 2020-02-21 ENCOUNTER — HOSPITAL ENCOUNTER (OUTPATIENT)
Dept: SPEECH THERAPY | Age: 85
Setting detail: THERAPIES SERIES
Discharge: HOME | End: 2020-02-21
Payer: MEDICARE

## 2020-02-21 PROCEDURE — 97130 THER IVNTJ EA ADDL 15 MIN: CPT

## 2020-02-21 PROCEDURE — 97129 THER IVNTJ 1ST 15 MIN: CPT

## 2020-02-21 NOTE — PROGRESS NOTES
Ms. Mare Noble was seen for cognitive therapy today. Therapy targeted:     · Patient studied a picture scene and used verbal rehearsal as a strategy to assist with recall. Patient demonstrated immediate recall with 81% accuracy. · Patient brought her pocket planner to use as a memory log. Patient added weekly appointments. She created memory pegs with minimal assistance. Homework was provided in order to aid in carryover. Continue plan of care. Treatment plan and goals can be found in the progress report.

## 2020-02-28 ENCOUNTER — HOSPITAL ENCOUNTER (OUTPATIENT)
Dept: SPEECH THERAPY | Age: 85
Setting detail: THERAPIES SERIES
Discharge: HOME | End: 2020-02-28
Payer: MEDICARE

## 2020-02-28 PROCEDURE — 97129 THER IVNTJ 1ST 15 MIN: CPT

## 2020-02-28 PROCEDURE — 97130 THER IVNTJ EA ADDL 15 MIN: CPT

## 2020-02-28 NOTE — PROGRESS NOTES
Orientation to Environment 29 30 91 95 14 15 [facility] WFL   Recall of General Information 26 24 75 50 12 10 Moderate-mild moderate   Problem Solving & Abstract Reasoning 27 30 63 95 11 15 moderate WFL   Organization of Information 29 30 98 99 16 17 [facility]   Auditory Processing & Comprehension 28 28 75 75 12 12 Moderate-mild Moderate-mild   Problem Solving and  Comptche Reasoning 29 29 91 91 14 14 WFL WFL   Naming Common Objects 30 30 91 91 14 14 [facility] WFL   Functional Oral Reading 30 30 91 91 14 14 WFL WFL     Composites Sum of Standard   Scores 6/7/19 Sum of Standard  Scores 12/13/19 Quotients 6/7/19 Quotients 12/13/19 Percentile   Rank 6/7/19 Percentile   Rank 12/13/19 Severity   Rating 6/7/19 Severity  Rating 12/13/19   Information Processing 127 138 119 127 90 97 Mild-WFL WFL   Memory 37 40 115 121 84 92 mild WFL   Orientation 37 40 115 121 84 92 mild WFL   Problem Solving 41 46 124 134 95 99 WFL WFL       VARIANT OBSERVATIONS:     Present 6/7/19 Present 12/13/19 Absent  6/7/19 Present   12/13/19   Error (e) x x     Perseveration (p)   x x   Repeat Instructions/Stimulus (r) x x     Denial/Refusal (d) x  x    Delayed Response (dl) x x     Confabulation (c)   x x   Partially Correct (pc) x x     Irrelevant (i)   x x   Tangential (t)   x x   Self-corrected (sc) x x         Formalized assessment of cognition was continued and completed on 7/11/19 via the Assessment of Language-Related Functional Activities (NÉSTOR). This assessment measures the patient's ability to complete functional cognitive-language activities of daily living. An independent functioning rating of 1 indicates a high probability of independent functioning on that task. A score of 2 indicates the need for some level of assistance on that task. A score of 3 indicates a high probability that the patient is not able to function independently on that task assessed. Results of the assessment will be indicated below.       SUBTEST PERCENT CORRECT compensatory aids/strategies. -IMP    DISCHARGE SUMMARY:  Yomi Delarosa demonstrated improvement in response to speech therapy intervention. Continued therapy is recommended in order to address the above mentioned needs. Prognosis for continued improvement is good. However, the patient has requested discharge at this time. A new prescription would be necessary to resume therapy.

## 2020-06-05 ENCOUNTER — OFFICE VISIT (OUTPATIENT)
Dept: NEUROLOGY | Age: 85
End: 2020-06-05
Payer: MEDICARE

## 2020-06-05 VITALS
WEIGHT: 125 LBS | OXYGEN SATURATION: 98 % | HEART RATE: 79 BPM | RESPIRATION RATE: 18 BRPM | DIASTOLIC BLOOD PRESSURE: 88 MMHG | BODY MASS INDEX: 24.54 KG/M2 | TEMPERATURE: 97.4 F | SYSTOLIC BLOOD PRESSURE: 149 MMHG | HEIGHT: 60 IN

## 2020-06-05 PROCEDURE — G8420 CALC BMI NORM PARAMETERS: HCPCS | Performed by: CLINICAL NURSE SPECIALIST

## 2020-06-05 PROCEDURE — 1036F TOBACCO NON-USER: CPT | Performed by: CLINICAL NURSE SPECIALIST

## 2020-06-05 PROCEDURE — 4040F PNEUMOC VAC/ADMIN/RCVD: CPT | Performed by: CLINICAL NURSE SPECIALIST

## 2020-06-05 PROCEDURE — G8427 DOCREV CUR MEDS BY ELIG CLIN: HCPCS | Performed by: CLINICAL NURSE SPECIALIST

## 2020-06-05 PROCEDURE — 1090F PRES/ABSN URINE INCON ASSESS: CPT | Performed by: CLINICAL NURSE SPECIALIST

## 2020-06-05 PROCEDURE — 99214 OFFICE O/P EST MOD 30 MIN: CPT | Performed by: CLINICAL NURSE SPECIALIST

## 2020-06-05 PROCEDURE — 1123F ACP DISCUSS/DSCN MKR DOCD: CPT | Performed by: CLINICAL NURSE SPECIALIST

## 2020-06-05 RX ORDER — MEMANTINE HYDROCHLORIDE 5 MG/1
5 TABLET ORAL DAILY
Qty: 30 TABLET | Refills: 2 | Status: SHIPPED
Start: 2020-06-05 | End: 2020-08-31

## 2020-06-05 NOTE — PROGRESS NOTES
tablet Take 1,250 Units by mouth Twice a Week Yes Historical Provider, MD   vitamin E 400 UNIT capsule Take 400 Units by mouth Twice a Week Yes Historical Provider, MD   Elastic Bandages & Supports (JOBST KNEE HIGH COMPRESSION SM) MISC Knee high with 20- 30 mmhg of compression Yes Calvin Morel MD   triamcinolone (KENALOG) 0.025 % cream Apply topically 2 times daily.  Yes Antoine Faust MD   pantoprazole (PROTONIX) 20 MG tablet take 1 tablet by mouth once daily Yes Antoine Faust MD   dextromethorphan-guaiFENesin [facility] DM)  MG per extended release tablet Take 1 tablet by mouth every 12 hours as needed Yes Historical Provider, MD   Magnesium Hydroxide (MILK OF MAGNESIA PO) Take by mouth Yes Historical Provider, MD   losartan (COZAAR) 100 MG tablet Take 100 mg by mouth daily Yes Historical Provider, MD   acetaminophen (TYLENOL) 325 MG tablet Take 2 tablets by mouth every 6 hours Yes Dyana Solorio MD   Multiple Vitamins-Minerals (PRESERVISION AREDS 2 PO) Take by mouth Yes Historical Provider, MD   Coenzyme Q10 (COQ10) 100 MG CAPS Take by mouth daily Yes Historical Provider, MD     Allergies as of 06/05/2020 - Review Complete 06/05/2020   Allergen Reaction Noted    Codeine  11/02/2011    Vicodin [hydrocodone-acetaminophen] Nausea Only 05/15/2018       Objective:     BP (!) 149/88 (Site: Left Upper Arm, Position: Sitting, Cuff Size: Medium Adult)   Pulse 79   Temp 97.4 °F (36.3 °C) (Oral)   Resp 18   Ht 5' (1.524 m)   Wt 125 lb (56.7 kg)   SpO2 98%   BMI 24.41 kg/m²   Afebrile     General: an elderly woman in no acute distress who was alert, cooperative -- pleasant  Head: normocephalic and atraumatic  Neck: limited ROM  Peripheral pulses: +1 without bruits  Extremities: no edema appreciated     Grimaces to palpation of right occipital ridge      Mental Status: alert; oriented to person, place and year     Aware of grandchildren and great grandchildren (forgot how many GG she had)     MMSE 9.3 01/19/2020    BILITOT 0.4 08/19/2018    ALKPHOS 67 08/19/2018    AST 16 08/19/2018    ALT 13 08/19/2018     Acetylcholine receptor antibody negative    B12 856    CK 33    Homocysteine normal     Labs from March and May 2019 personally reviewed today     Assessment:     Dementia - most likely of the Alzheimer's type    Unable to tolerate Namenda in the past but willing to retry    Given her c/o dizziness -- hesitant to trial Aricept or like medication     Sleep deprivation - improving with Trazodone 50mg     LS radiculopathy   Not a surgical candidate -- following with PT     Plan:     Namenda 5mg daily HS    RTO 2 months     Sanz Smack  9:23 AM  6/5/2020

## 2020-06-12 ENCOUNTER — HOSPITAL ENCOUNTER (OUTPATIENT)
Age: 85
Discharge: HOME | End: 2020-06-14

## 2020-06-12 PROCEDURE — 88305 TISSUE EXAM BY PATHOLOGIST: CPT

## 2020-06-12 PROCEDURE — 88342 IMHCHEM/IMCYTCHM 1ST ANTB: CPT

## 2020-06-17 ENCOUNTER — TELEPHONE (OUTPATIENT)
Dept: NEUROLOGY | Age: 85
End: 2020-06-17

## 2020-06-17 NOTE — TELEPHONE ENCOUNTER
Pt's daughter, Christelle Coello, called stating pt has been very tired since starting memantine 5mg qd. Pt takes Trazodone 75mg qhs so she has been taking memantine around 11am. Christelle Coello would like to know if pt should take the med at night or what else they should do. Forwarding to Estefani Acevedo DNP, for advisement.   Electronically signed by Kena Lynn on 6/17/20 at 12:03 PM EDT

## 2020-08-21 ENCOUNTER — TELEPHONE (OUTPATIENT)
Dept: NEUROLOGY | Age: 85
End: 2020-08-21

## 2020-08-24 NOTE — TELEPHONE ENCOUNTER
Patient's daughter told to hold the [address] per [facility].   Electronically signed by Yogi Arirola MA on 8/24/20 at 9:15 AM EDT

## 2020-08-31 ENCOUNTER — OFFICE VISIT (OUTPATIENT)
Dept: NEUROLOGY | Age: 85
End: 2020-08-31
Payer: MEDICARE

## 2020-08-31 VITALS
TEMPERATURE: 99 F | RESPIRATION RATE: 18 BRPM | OXYGEN SATURATION: 92 % | BODY MASS INDEX: 24.54 KG/M2 | SYSTOLIC BLOOD PRESSURE: 158 MMHG | WEIGHT: 125 LBS | DIASTOLIC BLOOD PRESSURE: 79 MMHG | HEART RATE: 84 BPM | HEIGHT: 60 IN

## 2020-08-31 PROCEDURE — 1036F TOBACCO NON-USER: CPT | Performed by: CLINICAL NURSE SPECIALIST

## 2020-08-31 PROCEDURE — G8427 DOCREV CUR MEDS BY ELIG CLIN: HCPCS | Performed by: CLINICAL NURSE SPECIALIST

## 2020-08-31 PROCEDURE — 99214 OFFICE O/P EST MOD 30 MIN: CPT | Performed by: CLINICAL NURSE SPECIALIST

## 2020-08-31 PROCEDURE — G8420 CALC BMI NORM PARAMETERS: HCPCS | Performed by: CLINICAL NURSE SPECIALIST

## 2020-08-31 PROCEDURE — 4040F PNEUMOC VAC/ADMIN/RCVD: CPT | Performed by: CLINICAL NURSE SPECIALIST

## 2020-08-31 PROCEDURE — 1123F ACP DISCUSS/DSCN MKR DOCD: CPT | Performed by: CLINICAL NURSE SPECIALIST

## 2020-08-31 PROCEDURE — 1090F PRES/ABSN URINE INCON ASSESS: CPT | Performed by: CLINICAL NURSE SPECIALIST

## 2020-08-31 NOTE — PROGRESS NOTES
B-12 (CYANOCOBALAMIN) 1000 MCG tablet Take 1,000 mcg by mouth Twice a Week Yes Historical Provider, MD   vitamin D (CHOLECALCIFEROL) 1000 UNIT TABS tablet Take 1,250 Units by mouth Twice a Week Yes Historical Provider, MD   vitamin E 400 UNIT capsule Take 400 Units by mouth Twice a Week Yes Historical Provider, MD   Elastic Bandages & Supports (JOBST KNEE HIGH COMPRESSION SM) MISC Knee high with 20- 30 mmhg of compression Yes Renetta Temple MD   triamcinolone (KENALOG) 0.025 % cream Apply topically 2 times daily.  Yes Belvin Schaumann, MD   pantoprazole (PROTONIX) 20 MG tablet take 1 tablet by mouth once daily Yes Belvin Schaumann, MD   dextromethorphan-guaiFENesin [facility] DM)  MG per extended release tablet Take 1 tablet by mouth every 12 hours as needed Yes Historical Provider, MD   Magnesium Hydroxide (MILK OF MAGNESIA PO) Take by mouth Yes Historical Provider, MD   losartan (COZAAR) 100 MG tablet Take 100 mg by mouth daily Yes Historical Provider, MD   acetaminophen (TYLENOL) 325 MG tablet Take 2 tablets by mouth every 6 hours Yes Vivi Holbrook MD   Multiple Vitamins-Minerals (PRESERVISION AREDS 2 PO) Take by mouth Yes Historical Provider, MD   Coenzyme Q10 (COQ10) 100 MG CAPS Take by mouth daily Yes Historical Provider, MD     Allergies as of 08/31/2020 - Review Complete 08/31/2020   Allergen Reaction Noted    Codeine  11/02/2011    Vicodin [hydrocodone-acetaminophen] Nausea Only 05/15/2018       Objective:     BP (!) 158/79 (Site: Right Upper Arm, Position: Sitting, Cuff Size: Medium Adult)   Pulse 84   Temp 99 °F (37.2 °C)   Resp 18   Ht 5' (1.524 m)   Wt 125 lb (56.7 kg)   SpO2 92%   BMI 24.41 kg/m²   Afebrile     General: an elderly woman in no acute distress who was alert, cooperative -- pleasant  Head: normocephalic and atraumatic  Neck: limited ROM  Peripheral pulses: +1 without bruits  Extremities: no edema appreciated     Grimaces to palpation of right occipital ridge      Mental Status: alert; oriented to person, place and year     Aware of grandchildren and great grandchildren today     MMSE 29/30 today - last appt 27/30    Speech: clear  Language: appropriate    No mask-like facies - no Myerson's sign     Cranial Nerves:  I: smell    II: visual acuity     II: visual fields Full   II: pupils MARCELLE   III,VII: ptosis None   III,IV,VI: extraocular muscles  Full ROM - smooth pursuits    V: mastication Normal   V: facial light touch sensation  Normal   V,VII: corneal reflex  Present   VII: facial muscle function - upper     VII: facial muscle function - lower Normal   VIII: hearing Normal   IX: soft palate elevation  Normal   IX,X: gag reflex Present   XI: trapezius strength  5/5   XI: sternocleidomastoid strength 5/5   XI: neck extension strength  5/5   XII: tongue strength  Normal     Motor:  5/5 throughout  Normal tone and bulk    No tremor  No cogwheel rigidity     Sensory:  LT  normal  Vibration moderately decreased in ankles     Coordination:   FN, FFM and STANISLAW symmetrical     Gait:  Slightly slow-- with minimal shuffling  Cane in right hand     DTR:   BE throughout  No grasp or suck reflexes    Laboratory/Radiology:     CBC with Differential:    Lab Results   Component Value Date    WBC 4.0 01/19/2020    RBC 3.95 01/19/2020    HGB 12.4 01/19/2020    HCT 39.6 01/19/2020     01/19/2020    .3 01/19/2020    MCH 31.4 01/19/2020    MCHC 31.3 01/19/2020    RDW 13.8 01/19/2020    SEGSPCT 70 07/18/2013    LYMPHOPCT 18.0 01/19/2020    MONOPCT 10.8 01/19/2020    EOSPCT 2 07/12/2017    BASOPCT 0.8 01/19/2020    MONOSABS 0.43 01/19/2020    LYMPHSABS 0.72 01/19/2020    EOSABS 0.06 01/19/2020    BASOSABS 0.03 01/19/2020     CMP:    Lab Results   Component Value Date     01/19/2020    K 3.7 01/19/2020     01/19/2020    CO2 30 01/19/2020    BUN 16 01/19/2020    CREATININE 0.8 01/19/2020    GFRAA >60 01/19/2020    LABGLOM >60 01/19/2020    GLUCOSE 130 01/19/2020    GLUCOSE 124 11/04/2011    PROT 5.0 08/19/2018    LABALBU 3.0 08/19/2018    LABALBU 3.2 11/04/2011    CALCIUM 9.3 01/19/2020    BILITOT 0.4 08/19/2018    ALKPHOS 67 08/19/2018    AST 16 08/19/2018    ALT 13 08/19/2018     Acetylcholine receptor antibody negative    B12 856    CK 33    Homocysteine normal     Labs from March and May 2019 personally reviewed today     Assessment:     Dementia - most likely of the Alzheimer's type    Unable to tolerate Namenda in the past but I doubt her 'dizzy\" complaints are related to this    Given her c/o dizziness -- hesitant to trial Aricept or like medication but will consider Exelon patches because she is currently not being treated outside of memory exercises      Sleep deprivation - improving with Trazodone    Now fragmented with bladder issues     LS radiculopathy   Not a surgical candidate -- following with PT     Plan:     Recommend following with urology for increased frequency     RTO 2 months -- tele visit to discuss Exelon patch     Danica Caldwell  10:42 AM  8/31/2020

## 2020-10-05 NOTE — TELEPHONE ENCOUNTER
Patient's daughter notified of Cedrick's response.   Electronically signed by Elio Browning MA on 10/5/20 at 2:44 PM EDT

## 2020-11-19 NOTE — ED PROVIDER NOTES
ED Attending  CC: Yes       Department of Emergency Medicine   ED  Provider Note  Admit Date/RoomTime: 11/19/2020  5:56 PM  ED Room: 19/19    Chief Complaint   Fall (back pain into legs after fall, denies head injury or LOC   no thinners); Back Pain; and Dizziness    History of Present Illness   Source of history provided by:  patient. History/Exam Limitations: none. Miya Choudhary is a 80 y.o. old female who presents to the emergency department by private vehicle, for a fall which occured several hour(s) prior to arrival. She was reportedly standing in kitchen and felt dizzy and fell while at home prior to incident with complaints of mid to low back pain. She has a hx of dizziness and has medication for it. She sts her pcp is aware and that she gets dizzy and lightheaded when she overdose it at home. She is denying any chest pain, headache, head injury, neck pain, abdominal pain, shortness of breath or feeling as though she is going to pass out. Her only complaint is that she has pain to her mid lower back. ROS    Pertinent positives and negatives are stated within HPI, all other systems reviewed and are negative.   Past Medical History:   Diagnosis Date    Anxiety     Arthritis     Back pain     Bilateral carotid bruits 11/28/2018    Blood circulation, collateral     Cancer of esophagus (HCC)     Carotid artery stenosis     Carotid bruit 9/20/2012    Carotid stenosis, right 9/20/2012    GERD (gastroesophageal reflux disease)     GERD (gastroesophageal reflux disease)     H/O burning pain in leg 11/28/2018    Hypertension     Leg swelling 11/28/2018    Lymphedema of lower extremity 9/20/2012    Mixed hyperlipidemia 8/13/2018    Peripheral vascular disease ([address]     Spider veins 11/28/2018    Varicose veins of right lower extremity with pain 11/28/2018     Past Surgical History:   Procedure Laterality Date    CARDIAC SURGERY      heart catherization in early 1970's    COLONOSCOPY  ECHO COMPL W DOP COLOR FLOW  11/6/2011         ESOPHAGUS SURGERY      HYSTERECTOMY      UPPER GASTROINTESTINAL ENDOSCOPY  11-2-2011    UPPER GASTROINTESTINAL ENDOSCOPY  05/2017   Social History:  reports that she has never smoked. She has never used smokeless tobacco. She reports that she does not drink alcohol or use drugs. Family History: family history includes Cancer in her brother and brother; Early Death in her brother; Heart Disease in her sister. Allergies: Codeine and Vicodin [hydrocodone-acetaminophen]    Physical Exam   Oxygen Saturation Interpretation: Normal.  ED Triage Vitals [11/19/20 1554]   BP Temp Temp Source Pulse Resp SpO2 Height Weight   -- 98.7 °F (37.1 °C) Skin 94 18 96 % 5' (1.524 m) 114 lb (51.7 kg)        Physical Exam  · Constitutional:  Alert, development consistent with age. · HEENT:  NC/NT. Airway patent. · Neck:  No midline or paravertebral tenderness. Normal ROM. Supple. · Chest:  Symmetrical without visible rash or tenderness. · Respiratory:  Lungs Clear to auscultation and breath sounds equal.  · CV:  Regular rate and rhythm, normal heart sounds, without pathological murmurs, ectopy, gallops, or rubs. · GI:  Abdomen Soft, nontender, good bowel sounds. No firm or pulsatile mass. · Pelvis:  Stable, nontender to palpation. · Back: Mild paraspinal tenderness to the lower thoracic and lumbar spine without crepitus or wounds. Range of motion is full with painful. No step off on exam of the thoracic or lumnar spine. She does have tenderness to the left hip and left pelvic area  · Extremities: No tenderness or edema noted. · Integument:  Normal turgor. Warm, dry, without visible rash, unless noted elsewhere. · Lymphatic: no lymphadenopathy noted  · Neurological:  Oriented x3, GCS 15. Motor functions intact.      Lab / Imaging Results   (All laboratory and radiology results have been personally reviewed by myself)  Labs:  Results for orders placed or performed during the hospital encounter of 11/19/20   CBC auto differential   Result Value Ref Range    WBC 7.6 4.5 - 11.5 E9/L    RBC 4.26 3.50 - 5.50 E12/L    Hemoglobin 13.5 11.5 - 15.5 g/dL    Hematocrit 42.0 34.0 - 48.0 %    MCV 98.6 80.0 - 99.9 fL    MCH 31.7 26.0 - 35.0 pg    MCHC 32.1 32.0 - 34.5 %    RDW 14.5 11.5 - 15.0 fL    Platelets 548 697 - 576 E9/L    MPV 10.5 7.0 - 12.0 fL    Neutrophils % 81.6 (H) 43.0 - 80.0 %    Immature Granulocytes % 0.1 0.0 - 5.0 %    Lymphocytes % 10.5 (L) 20.0 - 42.0 %    Monocytes % 6.5 2.0 - 12.0 %    Eosinophils % 0.9 0.0 - 6.0 %    Basophils % 0.4 0.0 - 2.0 %    Neutrophils Absolute 6.16 1.80 - 7.30 E9/L    Immature Granulocytes # 0.01 E9/L    Lymphocytes Absolute 0.79 (L) 1.50 - 4.00 E9/L    Monocytes Absolute 0.49 0.10 - 0.95 E9/L    Eosinophils Absolute 0.07 0.05 - 0.50 E9/L    Basophils Absolute 0.03 0.00 - 0.20 E9/L   EKG 12 Lead   Result Value Ref Range    Ventricular Rate 80 BPM    Atrial Rate 80 BPM    P-R Interval 190 ms    QRS Duration 86 ms    Q-T Interval 364 ms    QTc Calculation (Bazett) 419 ms    P Axis 67 degrees    R Axis 7 degrees    T Axis 33 degrees     EKG #1:  Interpreted by emergency department physician unless otherwise noted. Time:  1621    Rate: 80  Rhythm: Sinus. Interpretation: normal EKG, normal sinus rhythm. No change from 1/2020    Imaging: All Radiology results interpreted by Radiologist unless otherwise noted. CT THORACIC SPINE WO CONTRAST   Final Result   1. Acute appearing comminuted fracture involving T11 vertebral body with   mild retropulsion. MRI may be helpful for further evaluation. 2.  Mild loss of height involving superior endplate of T5 related to fracture   of uncertain chronicity. 3.  Chronic fracture involving superior endplate of T2.      XR THORACIC SPINE (3 VIEWS)   Final Result   1. Upper thoracic segments are not optimally assessed. CT may be helpful   for further evaluation.    2.  No evidence of compression fracture involving mid or lower thoracic   segments. 3.  Generalized osteopenia. XR LUMBAR SPINE (MIN 4 VIEWS)   Final Result   No evidence of lumbar spine compression fracture. XR CHEST (2 VW)    (Results Pending)   CT HEAD WO CONTRAST    (Results Pending)   CT CERVICAL SPINE WO CONTRAST    (Results Pending)   CT LUMBAR SPINE WO CONTRAST    (Results Pending)   XR HIP 2-3 VW W PELVIS LEFT    (Results Pending)     ED Course / Medical Decision Making     Medications   HYDROcodone-acetaminophen (NORCO) 5-325 MG per tablet 1 tablet (1 tablet Oral Given 11/19/20 2022)        Re-examination:  11/19/20     Time: 2020  Patients symptoms show no change. She continues to have pain to the thoracic and lumbar area. She also has tenderness on palpation to the left hip and pelvic area. Abnormal CT findings were discussed with Dr. Julissa Virgen he did examined the patient and recommend patient be transferred to [facility] in [facility]. Call was placed through the access center case was discussed with Dr. Jacqueline Mireles made aware patient will be coming for trauma consult. Additional imaging was added. 2050- son Alida South was called at [phone] made aware of abnormal findings and the plan for transfer to [facility] for trauma consult    Consult(s):   None    MDM:   Patient has a comminuted fracture of T11 with retropulsion. She is neurologically intact. She will be transferred to the [place] [id number] for evaluation by trauma consult. Counseling:  Emergency Attending Physician, Physician Assistant, Nurse Practitioner and I reviewed today's visit with the patient and family member patient and son in addition to providing specific details for the plan of care and counseling regarding the diagnosis and prognosis. Questions are answered at this time and are agreeable with the plan. Assessment      1. Fall, initial encounter    2.  Other closed fracture of eleventh thoracic vertebra, initial encounter ([facility] [address]
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
## 2020-11-20 PROBLEM — S22.009A FRACTURE OF THORACIC SPINE (HCC): Status: ACTIVE | Noted: 2020-01-01

## 2020-11-20 PROBLEM — S12.9XXA CLOSED FRACTURE OF CERVICAL SPINE (HCC): Status: ACTIVE | Noted: 2020-01-01

## 2020-11-20 NOTE — CONSULTS
[address]                  [address] [facility],  [facility]                                  CONSULTATION    PATIENT NAME: Jorge Credit                 :        1933  MED REC NO:   [id number]                            ROOM:       14A  ACCOUNT NO:   [de-identified]                           ADMIT DATE: 2020  PROVIDER:     Magaly Schumacher MD    CONSULT DATE:  2020    REASON FOR CONSULT:  1. Cervical compression fracture. 2.  Thoracic compression fracture. HISTORY OF PRESENT ILLNESS:  The patient is an 43-year-old lady who  apparently lost her footing, felt some weakness in her legs and  apparently fell down to the ground yesterday. She does have a history  of chronic neck and back pain. She did state that she might have had  some worsening of her neck and back pain. She was brought to the  emergency room by her son, seen at an outside facility and was diagnosed  with cervical and thoracic compression fracture of indeterminate age. She was ultimately brought to [facility] for further  evaluation and management. At this time, she tells me that her neck and  back pain are about the same as they have always been. She has seen Dr. Sari Hilliard in Pain Management for cervical and lumbar epidural steroid  injections. She describes the pain as an aching pain, it is in between  her shoulder blades and in her neck and also in her low back. She  states that the pain is about 4-5/10. She denies any numbness,  tingling, weakness or loss of control of bowel or bladder function. PAST MEDICAL HISTORY:  Positive for anxiety, arthritis, back pain,  carotid bruit, gastroesophageal reflux, and peripheral vascular disease. PAST SURGICAL HISTORY:  Positive for hysterectomy. SOCIAL HISTORY:  Negative for tobacco or alcohol use. FAMILY HISTORY:  Positive for cancer in her brother.     ALLERGIES:  Include CODEINE and VICODIN. REVIEW OF SYSTEMS:  HEENT:  Negative for headache, double vision, blurry  vision, but positive for chronic neck pain. CARDIOVASCULAR:  Negative  for chest pain, arrhythmia, or palpitations. RESPIRATORY:  Negative for  shortness of breath, asthma, bronchitis or pneumonia. GASTROINTESTINAL:  Positive for constipation. GENITOURINARY:  Negative for hematuria or  dysuria. HEMATOLOGIC:  Positive for easy bruising. INFECTIOUS:   Negative for any recent infection. MUSCULOSKELETAL:  Positive for joint  stiffness and swelling. PSYCHIATRIC:  Negative for anxiety or  depression. NEUROLOGIC:  Negative for seizure or stroke. ENDOCRINE:   Negative for thyroid disorder or diabetes. PHYSICAL EXAMINATION:  VITAL SIGNS:  She is currently afebrile with a T-current of 36.7 degrees  Celsius, pulse is 97, blood pressure is 155/70. GENERAL:  She is resting in bed. Does not appear to be in any acute  distress. Appears her stated age. HEENT:  Her head is normocephalic and atraumatic. Pupils are 3 to 2 mm  and reactive. She has no drainage out of her eyes, ears, nose or  throat. SKIN:  Her skin is warm and dry. MUSCULOSKELETAL:  She has got good range of motion of bilateral upper  and lower extremities. ABDOMEN:  Soft, nontender, nondistended. RESPIRATORY:  She is not using any accessory muscles of respiration. NEUROLOGIC:  On rest of her neurologic exam, she is awake, alert and  oriented x3. Cranial nerves II through XII are intact bilaterally. Motor exam reveals 4+/5 strength in her bilateral upper and lower  extremities. Sensation is grossly intact to light touch. Reflexes are  1+ and symmetric. Toes are going down. REVIEW OF IMAGING:  She has CT scan of her thoracic spine that shows  that she has T11 compression fracture of indeterminate age. She also  has a chronic T2 compression fracture.   She also has evidence of T5  compression fracture of indeterminate age and on her cervical spine she  has C6 and C7 compression fractures of indeterminate age. ASSESSMENT AND PLAN:  The patient is an 26-year-old lady with chronic  neck and back pain. The intensity of her pain has not changed. She  does not have any midline tenderness to palpation on her cervical spine. In light of the fact that her pain has not changed, it is unlikely that  her fractures are new, at this point in time I would treat her  expectantly, clear her cervical spine and we will manage her pain with  medications. We will get her evaluated by Physical Therapy and  Occupational Therapy.         Melecio Wilson MD    D: 11/20/2020 6:35:25       T: 11/20/2020 8:10:38     [email or web address]  Job#: [id number]     Doc#: [id number]    CC:

## 2020-11-20 NOTE — PROGRESS NOTES
Trauma Tertiary Survey    Admit Date: 11/19/2020  Hospital day 1    CC:  Fall, C6, C7, T11 fractures       Past Medical History:   Diagnosis Date    Anxiety     Arthritis     Back pain     Bilateral carotid bruits 11/28/2018    Blood circulation, collateral     Cancer of esophagus (HCC)     Carotid artery stenosis     Carotid bruit 9/20/2012    Carotid stenosis, right 9/20/2012    GERD (gastroesophageal reflux disease)     GERD (gastroesophageal reflux disease)     H/O burning pain in leg 11/28/2018    Hypertension     Leg swelling 11/28/2018    Lymphedema of lower extremity 9/20/2012    Mixed hyperlipidemia 8/13/2018    Peripheral vascular disease ([address]     Spider veins 11/28/2018    Varicose veins of right lower extremity with pain 11/28/2018       Alcohol pre-screening:    Women: How many times in the past year have you had 4 or more drinks in a day? none    Scheduled Meds:  Continuous Infusions:  PRN Meds:ketorolac    Subjective:     Pt states she is feeling okay this morning but still with some back pain. She has some right shoulder pain noticed on tertiary exam.    Objective:     Patient Vitals for the past 8 hrs:   BP Temp Pulse Resp SpO2 Height Weight   11/19/20 2330 (!) 168/148 -- 101 25 96 % -- --   11/19/20 2300 (!) 158/76 -- 96 21 91 % -- --   11/19/20 2229 (!) 155/70 98 °F (36.7 °C) 97 16 95 % 5' (1.524 m) 114 lb (51.7 kg)       No intake/output data recorded. No intake/output data recorded.     Past Medical History:   Diagnosis Date    Anxiety     Arthritis     Back pain     Bilateral carotid bruits 11/28/2018    Blood circulation, collateral     Cancer of esophagus (HCC)     Carotid artery stenosis     Carotid bruit 9/20/2012    Carotid stenosis, right 9/20/2012    GERD (gastroesophageal reflux disease)     GERD (gastroesophageal reflux disease)     H/O burning pain in leg 11/28/2018    Hypertension     Leg swelling 11/28/2018    Lymphedema of lower extremity 9/20/2012    Mixed hyperlipidemia 8/13/2018    Peripheral vascular disease [address] [address]     Spider veins 11/28/2018    Varicose veins of right lower extremity with pain 11/28/2018       Radiology:  XR SHOULDER RIGHT (MIN 2 VIEWS)    (Results Pending)       PHYSICAL EXAM:   GCS:  4 - Opens eyes on own   6 - Follows simple motor commands  5 - Alert and oriented    Pupil size:  Left 2 mm Right 2 mm  Pupil reaction: Yes  Wiggles fingers: Left Yes Right Yes  Hand grasp:   Left normal   Right normal  Wiggles toes: Left Yes    Right Yes  Plantar flexion: Left normal  Right normal    PHYSICAL EXAM  General: No apparent distress, comfortable   HEENT: Trachea midline, no masses, Pupils equal round   Chest: Respiratory effort was normal with no retractions or use of accessory muscles. Cardiovascular: Extremities warm, well perfused,   Abdomen:  Soft and non distended. No tenderness, guarding, rebound, or rigidity   Extremities: Moves all 4 extremities,mild bilateral lower extremity edema    Spine:     Spine Tenderness ROM   Cervical 2 /10 Not Indicated   Thoracic 6 /10 Not Indicated   Lumbar 0 /10 Not Indicated     Musculoskeletal    Joint Tenderness Swelling ROM   Right shoulder present absent abnormal - pain   Left shoulder absent absent normal   Right elbow absent absent normal   Left elbow absent absent normal   Right wrist absent absent normal   Left wrist absent absent normal   Right hand grasp absent absent normal   Left hand grasp absent absent normal   Right hip absent absent normal   Left hip absent absent normal   Right knee absent absent normal   Left knee absent absent normal   Right ankle absent absent normal   Left ankle absent absent normal   Right foot absent absent normal   Left foot absent absent normal       CONSULTS: Neurosurgery.      PROCEDURES: none    INJURIES:        Active Problems:    Fall from standing    Fracture of thoracic spine ([address]    Closed fracture of cervical spine (HCC)  Resolved Problems:    * No resolved hospital problems. *        Assessment/Plan:       · Neuro:  C6 and C7 compression fractures, T11 comminuted fracture. Aspen collar, spinal neutrality.  Awaiting neurosurgery recommendations  · CV: no acute isssues  · Pulm: no acute issues   · GI: no acute issues  · Renal: no acute issues  · ID: no acute issues    · Endocrine: no acute issues  · MSK: some right shoulder pain on exam   · Heme: no acute issues     Bowel regimen: glycolax  Pain control/Sedation: toradol  DVT prophylaxis: SCDs    Code status:    Prior    Patient/Family update:  As able    Disposition:  Monitored floor      Electronically signed by Francesca George MD on 11/20/20 at 6:05 AM EST

## 2020-11-20 NOTE — PROGRESS NOTES
TRAUMA SURGERY  ATTENDING PROGRESS NOTE  Patient seen and examined, agree with resident note, for remaining HP/Consult details please see resident HP/Consult note. CC:  Fall     S:   got dizzy and fell this morning, no LOC but c/o back pain     O:   @BP (!) 184/85   Pulse 91   Temp 98 °F (36.7 °C)   Resp 16   Ht 5' (1.524 m)   Wt 114 lb (51.7 kg)   SpO2 95%   BMI 22.26 kg/m² @    Gen - no apparent distress   Neuro - Awake, alert, attentive    HEENT - PERRL 3mm conj pink   Lungs - non labored, BS clear to auscultation b/l   Heart - RR, no obvious extra heart sounds    Abdomen - SNT no organomegaly   Spine -   Tender C/T spine   Ext- ROM WNL NVI all extremities     A/P:   S/p fall, with spine pain     Active Problems:    Fall from standing    Fracture of thoracic spine (HCC)    Closed fracture of cervical spine (HCC)  Resolved Problems:    * No resolved hospital problems. *      - C/T spine fx, NS to see, MRI ordered   - ?  Syncope, tele, trop neg, EKG, Echo   - Pain control SMI deep breathing   - home meds  - PTOT when able  - ok for diet       DVT prophylaxis: SCDs, Lovenox    Veronica Rendon MD FACS

## 2020-11-20 NOTE — ED PROVIDER NOTES
HPI:  11/19/20, Time: 10:23 PM KELTON Mcghee is a 80 y.o. female presenting to the ED for history of fall, beginning hours ago. The complaint has been persistent, moderate in severity, and worsened by movement of neck and back. Patient per report was seen at outlying facility she was diagnosis back fracture as well as cervical spine fracture. Patient reports she was sorting out her pills and became lightheaded and fell. Patient does not believe she passed out she does not believe she hit her head. Patient reporting no chest pain or shortness of breath she reports some mild lower abdominal pain. Patient reporting no fever chills or cough. ROS:   Pertinent positives and negatives are stated within HPI, all other systems reviewed and are negative.  --------------------------------------------- PAST HISTORY ---------------------------------------------  Past Medical History:  has a past medical history of Anxiety, Arthritis, Back pain, Bilateral carotid bruits, Blood circulation, collateral, Cancer of esophagus (HCC), Carotid artery stenosis, Carotid bruit, Carotid stenosis, right, GERD (gastroesophageal reflux disease), GERD (gastroesophageal reflux disease), H/O burning pain in leg, Hypertension, Leg swelling, Lymphedema of lower extremity, Mixed hyperlipidemia, Peripheral vascular disease ([address] Spider veins, and Varicose veins of right lower extremity with pain. Past Surgical History:  has a past surgical history that includes Hysterectomy; ECHO Compl W Dop Color Flow (11/6/2011); Colonoscopy; Cardiac surgery; Esophagus surgery; Upper gastrointestinal endoscopy (11-2-2011); and Upper gastrointestinal endoscopy (05/2017). Social History:  reports that she has never smoked. She has never used smokeless tobacco. She reports that she does not drink alcohol or use drugs.     Family History: family history includes Cancer in her brother and brother; Early Death in her brother; Heart Disease in her sister. The patients home medications have been reviewed. Allergies: Codeine and Vicodin [hydrocodone-acetaminophen]    ---------------------------------------------------PHYSICAL EXAM--------------------------------------    Constitutional/General: Alert and oriented x3,   Head: Normocephalic and atraumatic  Eyes: PERRL, EOMI  Mouth: Oropharynx clear, handling secretions, no trismus  Neck: C-collar in place  Pulmonary: Lungs clear to auscultation bilaterally, no wheezes, rales, or rhonchi. Not in respiratory distress  Cardiovascular:  Regular rate. Regular rhythm. No murmurs, gallops, or rubs. 2+ distal pulses  Chest: no chest wall tenderness  Abdomen: Soft. Non tender. Non distended. +BS. No rebound, guarding, or rigidity. No pulsatile masses appreciated. Musculoskeletal: Moves all extremities x 4. Back was not examined patient already had CTs done and shows fracture. Warm and well perfused, no clubbing, cyanosis, or edema. Capillary refill <3 seconds  Skin: warm and dry. No rashes. Neurologic: GCS 15, CN 2-12 grossly intact, no focal deficits, symmetric strength 5/5 in the upper and lower extremities bilaterally  Psych: Normal Affect    -------------------------------------------------- RESULTS -------------------------------------------------  I have personally reviewed all laboratory and imaging results for this patient. Results are listed below. LABS:  No results found for this visit on 11/19/20. RADIOLOGY:  Interpreted by Radiologist.  No orders to display               ------------------------- NURSING NOTES AND VITALS REVIEWED ---------------------------   The nursing notes within the ED encounter and vital signs as below have been reviewed by myself. There were no vitals taken for this visit. Oxygen Saturation Interpretation: Normal    The patients available past medical records and past encounters were reviewed.         ------------------------------ ED COURSE/MEDICAL DECISION MAKING----------------------  Medications - No data to display          Medical Decision Making:    Labs and CTs reviewed from outlying facility. Trauma service was consulted on arrival.  Plan will be to admit. Re-Evaluations:             Re-evaluation. Patients symptoms show no change      Consultations:             trauma    Critical Care: This patient's ED course included: a personal history and physicial eaxmination    This patient has been closely monitored during their ED course. Counseling: The emergency provider has spoken with the patient and discussed todays results, in addition to providing specific details for the plan of care and counseling regarding the diagnosis and prognosis. Questions are answered at this time and they are agreeable with the plan.       --------------------------------- IMPRESSION AND DISPOSITION ---------------------------------    IMPRESSION  1. Closed fracture of eleventh thoracic vertebra, unspecified fracture morphology, initial encounter ([facility] [address]        DISPOSITION  Disposition: Admit to trauma  Patient condition is fair        NOTE: This report was transcribed using voice recognition software.  Every effort was made to ensure accuracy; however, inadvertent computerized transcription errors may be present          Benson Easley MD  11/19/20 2067

## 2020-11-20 NOTE — PROCEDURES
11/20/20 2:07 pm - Pt is very confused, will check with floor nurse once pt is there to see if pt will be able to follow instructions to hold still or if she will need medication. ER RN said pt is currently not able to follow instructions/ very confused and unlikely to be a cooperative pt for an hour long MRI scan.

## 2020-11-20 NOTE — H&P
TRAUMA HISTORY & PHYSICAL  Surgical Resident/Advance Practice Nurse  11/19/2020  11:34 PM    PRIMARY SURVEY    CHIEF COMPLAINT:  Trauma consult. Injury occurred earlier today    Patient states that while she was organizing her pills at the sink today she became lightheaded fell down landed on her butt and back. She states that she did not blackout, did not lose consciousness. did not hit her head. She had some difficulty getting up, took approximately 15 minutes. Her son came and got her took her to the [facility].     CT of the head was without any acute intracranial process  CT of the cervical spine demonstrated loss of height of C6 vertebral body concerning for possible fracture. And stable compression fracture of C7.   CT of the chest demonstrating comminuted fracture involving T11 vertebral body with mild retropulsion. CT of the lumbar spine without acute fracture  Hip x-ray negative for acute fracture    Patient has a history of gastric lymphoma status post radiation currently in remission, aortic and mitral regurgitation. She is not on any anticoagulation. AIRWAY:   Airway Normal  EMS ETT Absent  Noisy respirations Absent  Retractions: Absent  Vomiting/bleeding: Absent      BREATHING:    Midaxillary breath sound left:  Normal  Midaxillary breath sound right:  Normal    Cough sound intensity:  good   FiO2: 1 L nasal cannula   mL.       CIRCULATION:   Femerol pulse intensity: Strong  Palpebral conjunctiva: Pink       Vitals:    11/19/20 2300   BP: (!) 158/76   Pulse: 96   Resp: 21   Temp:    SpO2: 91%       Vitals:    11/19/20 2229 11/19/20 2300   BP: (!) 155/70 (!) 158/76   Pulse: 97 96   Resp: 16 21   Temp: 98 °F (36.7 °C)    SpO2: 95% 91%   Weight: 114 lb (51.7 kg)    Height: 5' (1.524 m)         FAST EXAM: Not performed    Central Nervous System    GCS Initial 15 minutes   Eye  Motor  Verbal 4 - Opens eyes on own  6 - Follows simple motor commands  5 - Alert and oriented 4 - Opens eyes on own  6 - Follows simple motor commands  5 - Alert and oriented     Neuromuscular blockade: No  Pupil size:  Left 4 mm    Right 4 mm  Pupil reaction: Yes    Wiggles fingers: Left Yes Right Yes  Wiggles toes: Left Yes   Right Yes    Hand grasp:   Left  Present      Right  Present  Plantar flexion: Left  Present      Right   Present    Loss of consciousness:  No  History Obtained From:  Patient & EMS  Private Medical Doctor: Dr. Salinas Proffer History:  yes    Conditions: Mitral and aortic regurgitation, gastric lymphoma    Medications: Protonix losartan naproxen and trazodone    Allergies: Codeine, Vicodin becomes nauseous    Social History:   Tobacco use:  none  Alcohol use:  none  Illicit drug use:  no history of illicit drug use    Past Surgical History: No abdominal surgeries    Anticoagulant use:  No   Antiplatelet use:    No     NSAID use in last 72 hours: yes  Taken PCN in past:  yes  Last food/drink: Earlier today  Last tetanus: unknown    Family History:   Not pertinent to presenting problem. Complaints:   Head:  None  Neck:   Mild  Chest:   None  Back:   Moderate  Abdomen:   None  Extremities:   None  Comments:     Review of systems:  All negative unless otherwise noted. SECONDARY SURVEY  Head/scalp: Atraumatic    Face: Atraumatic    Eyes/ears/nose: Atraumatic    Pharynx/mouth: Atraumatic    Neck: Atraumatic     Cervical spine tenderness:   Cervical collar in place at time of arrival  Pain:  mild  ROM:  Not indicated     Chest wall:  Atraumatic    Heart: Tachycardia and regular rhythm    Abdomen: Atraumatic. Soft ND. Right sided abdominal mass palpated  Tenderness:  none    Pelvis: Atraumatic  Tenderness: none    Thoracolumbar spine: Step-offs or deformities appreciated  Tenderness: Tenderness to palpation in the mid and lower spine    Genitourinary:  Atraumatic. No blood or urine noted    Rectum: Atraumatic. No blood noted. Perineum: Atraumatic.   No blood or

## 2020-11-20 NOTE — PROGRESS NOTES
PCP is Alin Bonilla MD  Office notified of admission.       Electronically signed by Lakisha Jiménez RN MSN APRN-NP [facility] NP  [facility] 11/20/2020 12:51 PM

## 2020-11-20 NOTE — PROGRESS NOTES
2017 Called access center at this time for patient transfer to [address] [place] [address] for Tll fracture  2037 Dr. Lexi Evans called and has accepted the patient to the ED at this time  2109 PAS ETA 2139

## 2020-11-20 NOTE — DISCHARGE SUMMARY
Physician Discharge Summary     Patient ID:  Clemencia Durant  [id number]  89 y.o.  3/14/1933    Admit date: 11/19/2020    Discharge date and time: 11/23/2020  9:44 PM     Admitting Physician: Los Broussard MD     Admission Diagnoses: Fall from standing, initial encounter [W19. XXXA]    Discharge Diagnoses: Active Problems:    Fall from standing    Fracture of thoracic spine ([address]    Closed fracture of cervical spine ([address]  Resolved Problems:    * No resolved hospital problems. *      Admission Condition: fair    Discharged Condition: stable    Indication for Admission: Fall, C6 C7 compression fractures, T11 comminuted fracture    Hospital Course/Procedures/Operation/treatments:   11/19: Pt presented as a transfer from an outside hospital after a fall from standing height at home. She is found to have C6 and C7 compression fractures and a T11 comminuted fracture. Neurosurgery consulted  11/20: Pt feeling okay this morning, still with some back pain and now complaining of some mild right shoulder pain. Will obtain xrays. Awaiting neurosurgery recommendations  11/21 Per RN some confusion yesterday, however is oriented this morning. She complains of her chronic pain, refused MRIs. NSG recommended clearing collar clinically and treating her pain. Awaiting PTOT  11/22:  No new issues. Oriented. Echo shoed mild aortic stenosis. [facility] 14/24 11/23: Pt states she has been constipated, wanted something for it. Pt appears slightly confused, states she had not yet seen any doctors throughout her hospital stay so far. States she gets lightheaded when trying to get up. Denies any further issues.  Stable for discharge      Consults:   IP CONSULT TO TRAUMA SURGERY  IP CONSULT TO NEUROSURGERY    Significant Diagnostic Studies:   Xr Chest (2 Vw)    Result Date: 11/19/2020  EXAMINATION: TWO XRAY VIEWS OF THE CHEST 11/19/2020 9:26 pm COMPARISON: January 19, 2020 HISTORY: ORDERING SYSTEM PROVIDED HISTORY: cp TECHNOLOGIST PROVIDED HISTORY: Reason for exam:->cp FINDINGS: The lungs are clear. The heart remains enlarged. The costophrenic angles are clear. There is a stable calcified nodule at the right lung base. No active pulmonary process. Stable cardiomegaly. Stable calcified granuloma, right lower lobe. Xr Thoracic Spine (3 Views)    Result Date: 11/19/2020  EXAMINATION: THREE XRAY VIEWS OF THE THORACIC SPINE 11/19/2020 4:56 pm COMPARISON: Correlation made with view of the thoracic spine from CT chest performed August 17, 2018 HISTORY: ORDERING SYSTEM PROVIDED HISTORY: fall TECHNOLOGIST PROVIDED HISTORY: Reason for exam:->fall FINDINGS: No evidence of thoracic spine compression fracture. No lytic or blastic bone lesion. There is generalized osteopenia. Moderate degenerative endplate changes are seen involving mid and lower thoracic segments. 1.  Upper thoracic segments are not optimally assessed. CT may be helpful for further evaluation. 2.  No evidence of compression fracture involving mid or lower thoracic segments. 3.  Generalized osteopenia. Xr Lumbar Spine (min 4 Views)    Result Date: 11/19/2020  EXAMINATION: XRAY VIEWS OF THE LUMBAR SPINE 11/19/2020 4:56 pm COMPARISON: None. HISTORY: ORDERING SYSTEM PROVIDED HISTORY: trauma/pain TECHNOLOGIST PROVIDED HISTORY: Reason for exam:->trauma/pain FINDINGS: No evidence of lumbar spine compression fracture. There is generalized osteopenia. 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 No lytic or blastic bone lesion. No evidence of lumbar spine compression fracture.     Ct Head Wo Contrast    Result Date: 11/19/2020  EXAMINATION: CT OF THE HEAD WITHOUT CONTRAST  11/19/2020 9:05 pm TECHNIQUE: CT of the head was performed without the administration of intravenous contrast. Dose modulation, iterative reconstruction, and/or weight based adjustment of the mA/kV was utilized to reduce the radiation dose to as low as reasonably achievable. COMPARISON: January 19, 2020 HISTORY: ORDERING SYSTEM PROVIDED HISTORY: Trauma, fall TECHNOLOGIST PROVIDED HISTORY: Has a \"code stroke\" or \"stroke alert\" been called? ->No Reason for exam:->Trauma, fall FINDINGS: BRAIN/VENTRICLES: There are stable cortical atrophy and periventricular white matter ischemic changes. There is no acute intracranial hemorrhage, mass effect or midline shift. No abnormal extra-axial fluid collection. The gray-white differentiation is maintained without evidence of an acute infarct. There is no evidence of hydrocephalus. ORBITS: The visualized portion of the orbits demonstrate no acute abnormality. SINUSES: Small retention cyst versus polyp noted in the right maxillary sinus. The remaining paranasal sinuses and mastoids are well aerated. SOFT TISSUES/SKULL:  No acute abnormality of the visualized skull or soft tissues. No acute intracranial abnormality. Chronic right maxillary sinusitis. Ct Cervical Spine Wo Contrast    Result Date: 11/19/2020  EXAMINATION: CT OF THE CERVICAL SPINE WITHOUT CONTRAST 11/19/2020 9:05 pm TECHNIQUE: CT of the cervical spine was performed without the administration of intravenous contrast. Multiplanar reformatted images are provided for review. Dose modulation, iterative reconstruction, and/or weight based adjustment of the mA/kV was utilized to reduce the radiation dose to as low as reasonably achievable. COMPARISON: January 19, 2020 HISTORY: ORDERING SYSTEM PROVIDED HISTORY: Trauma TECHNOLOGIST PROVIDED HISTORY: Reason for exam:->Trauma FINDINGS: There is increased loss of height involving C6 vertebral body compared to prior. There is stable compression fracture involving C7 of approximately 15%. There is a chronic fracture involving superior endplate of T2. No osseous central canal stenosis. There is generalized osteopenia. 1.  Loss of height involving the C6 vertebral body could suggest new fracture.   MRI could be helpful for further evaluation. 2.  Stable compression fracture of C7. Ct Thoracic Spine Wo Contrast    Result Date: 11/19/2020  EXAMINATION: CT OF THE THORACIC SPINE WITHOUT CONTRAST  11/19/2020 6:36 pm: TECHNIQUE: CT of the thoracic spine was performed without the administration of intravenous contrast. Multiplanar reformatted images are provided for review. Dose modulation, iterative reconstruction, and/or weight based adjustment of the mA/kV was utilized to reduce the radiation dose to as low as reasonably achievable. COMPARISON: November 4, 2011. HISTORY: ORDERING SYSTEM PROVIDED HISTORY: trauma,  suboptimal xrays, request CT by rad TECHNOLOGIST PROVIDED HISTORY: Reason for exam:->trauma,  suboptimal xrays, request CT by rad FINDINGS: There is a comminuted fracture involving T11 vertebral body without significant loss of height. There is minimal retropulsion. There is mild loss of height involving superior endplate of T5. There is a chronic fracture involving superior endplate of T2. There is generalized osteopenia. 1.  Acute appearing comminuted fracture involving T11 vertebral body with mild retropulsion. MRI may be helpful for further evaluation. 2.  Mild loss of height involving superior endplate of T5 related to fracture of uncertain chronicity. 3.  Chronic fracture involving superior endplate of T2. Ct Lumbar Spine Wo Contrast    Result Date: 11/19/2020  EXAMINATION: CT OF THE LUMBAR SPINE WITHOUT CONTRAST  11/19/2020 TECHNIQUE: CT of the lumbar spine was performed without the administration of intravenous contrast. Multiplanar reformatted images are provided for review. Dose modulation, iterative reconstruction, and/or weight based adjustment of the mA/kV was utilized to reduce the radiation dose to as low as reasonably achievable.  COMPARISON: 03/27/2019 HISTORY: ORDERING SYSTEM PROVIDED HISTORY: Pain TECHNOLOGIST PROVIDED HISTORY: Reason for exam:->Pain FINDINGS: BONES/ALIGNMENT: There is minimal pantoprazole (PROTONIX) 40 MG tablet Take 40 mg by mouth dailyHistorical Med      carboxymethylcellulose 1 % ophthalmic solution Place 1 drop into the right eye 3 times dailyHistorical Med      Menthol, Topical Analgesic, (BIOFREEZE) 4 % GEL Apply 1 each topically 3 times dailyHistorical Med      traZODone (DESYREL) 50 MG tablet Take 1.5 tablets by mouth nightly, Disp-30 tablet,R-1Normal      vitamin B-12 (CYANOCOBALAMIN) 1000 MCG tablet Take 1,000 mcg by mouth Twice a Week Given Tuesday, ThursdayHistorical Med      vitamin D (CHOLECALCIFEROL) 1000 UNIT TABS tablet Take 1,000 Units by mouth Twice a Week Given Monday,WednesdayHistorical Med      vitamin E 400 UNIT capsule Take 400 Units by mouth Twice a Week Given Saturday,SundayHistorical Med      Multiple Vitamins-Minerals (PRESERVISION AREDS 2) CAPS Take 1 capsule by mouth 2 times daily Historical Med      Coenzyme Q10 (COQ10) 100 MG CAPS Take 100 mg by mouth daily Historical Med             SPECIAL CONSIDERATIONS FOR OUR PATIENTS OVER THE AGE OF 65Y   Getting around your home safely can be a challenge if you have injuries or health problems that make it easy for you to fall. Loose rugs and furniture in walkways are among the dangers for many older people who have problems walking or who have poor eyesight. People who have conditions such as arthritis, osteoporosis, or dementia also must be careful not to fall. You can make your home safer with a few simple measures. Follow-up care is a key part of your treatment and safety. Be sure to make and go to all appointments, and call your doctor or nurse call line if you are having problems. It's also a good idea to know your test results and keep a list of the medicines you take. How can you care for yourself at home? Taking care of yourself   You may get dizzy if you do not drink enough water. To prevent dehydration, drink plenty of fluids, enough so that your urine is light yellow or clear like water.  Choose water and other caffeine-free clear liquids. If you have kidney, heart, or liver disease and have to limit fluids, talk with your doctor before you increase the amount of fluids you drink. Exercise regularly to improve your strength, muscle tone, and balance. Walk if you can. Swimming may be a good choice if you cannot walk easily. Have your vision and hearing checked each year or any time you notice a change. If you have trouble seeing and hearing, you might not be able to avoid objects and could lose your balance. Know the side effects of the medicines you take. Ask your doctor or pharmacist whether the medicines you take can affect your balance. Sleeping pills or sedatives can affect your balance. Limit the amount of alcohol you drink. Alcohol can impair your balance and other senses. Ask your doctor whether calluses or corns on your feet need to be removed. If you wear loose-fitting shoes because of calluses or corns, you can lose your balance and fall. Talk to your doctor if you have numbness in your feet. Preventing falls at home   Remove raised doorway thresholds, throw rugs, and clutter. Repair loose carpet or raised areas in the floor. Move furniture and electrical cords to keep them out of walking paths. Use non-skid floor wax, and wipe up spills right away, especially on ceramic tile floors. If you use a walker or cane, put rubber tips on it. If you use crutches, clean the bottoms of them regularly with an abrasive pad, such as steel wool. Keep your house well lit, especially stairways, porches, and outside walkways. Use night-lights in areas such as hallways and washrooms. Add extra light switches or use remote switches (such as switches that go on or off when you clap your hands) to make it easier to turn lights on if you have to get up during the night. Install sturdy handrails on stairways.    Move items in your cabinets so that the things you use a lot are on the lower shelves (about waist level). Keep a cordless phone and a flashlight with new batteries by your bed. If possible, put a phone in each of the main rooms of your house, or carry a cell phone in case you fall and cannot reach a phone. Or, you can wear a device around your neck or wrist. You push a button that sends a signal for help. Wear low-heeled shoes that fit well and give your feet good support. Use footwear with non-skid soles. Check the heels and soles of your shoes for wear. Repair or replace worn heels or soles. Do not wear socks without shoes on wood floors. Walk on the grass when the sidewalks are slippery. If you live in an area that gets snow and ice in the winter, sprinkle salt on slippery steps and sidewalks. Preventing falls in the bath   Install grab bars and non-skid mats inside and outside your shower or tub and near the toilet and sinks. Use shower chairs and bath benches. Use a hand-held shower head that will allow you to sit while showering. Get into a tub or shower by putting the weaker leg in first. Get out of a tub or shower with your strong side first.   Repair loose toilet seats and consider installing a raised toilet seat to make getting on and off the toilet easier. Keep your washroom door unlocked while you are in the shower. Follow-up: Trauma Clinic: [phone] option 2   [facility] [address], [address] [facility]   MILD TRAUMATIC BRAIN INJURY OR CONCUSSION   A mild traumatic brain injury (TBI) is one that causes some degree of injury to the brain causing symptoms ranging from a brief period of confusion to loss of consciousness (being knocked out). There is no major bruising or bleeding in the brain but symptoms can last from hours to months depending on the severity of the injury. Family or friends need to observe any change in behavior for the next 48 hours.  Delayed effects from head injury do occur occasionally and can be due to slow bleeding or swelling around the surface of the brain. These effects may occur even if the x-rays/CT scans were normal. Please observe the following symptoms during the next 24-48 hours. CALL 911 if:   Pupils (black part in the center of the eye) are unequal in size, and this is new. Seizure (convulsion). Not responding to others/won't wake up or very hard to wake up   Faints (passes out)   Vomiting more than 3 times  Notify the TRAUMA CLINIC if any of the following symptoms occur:   Severe headache -- Mild headache may last for days. Report worsening pain or uncontrolled pain with prescribed medicine. Numbness, tingling or weakness -- Present in arms or legs; unsteady walking. Eye Changes/light sensation -- Vision problems; blurred or double-vision; unequal sized pupils. Nausea/Vomiting -- Episodes of vomiting may occur initially after a head injury. Persistent vomiting or difficulty taking medication by mouth. Increased Sleepiness -- Difficulty waking from sleep with increased confusion. Dizziness -- Does not go away or occurs repeatedly. Vomiting may accompany dizziness. Drainage -- Clear fluid or blood from the nose and ears. Fever -- Temperature over 101 degrees. Neck Pain. The First Four Weeks   The symptoms below are common after a mild brain injury. They usually get better on their own within a few weeks:   feeling tired or ?low   problems falling or staying asleep   feeling confused, poor concentration, or slow to answer questions   feeling dizzy, poor balance, or poor coordination   being sensitive to light   being sensitive to sounds   ringing in the ears   a mild headache, sometimes with nausea and/or vomiting   being irritable, having mood swings, or feeling somewhat sad or down  Contact the [address] if your symptoms are affecting your everyday activities. Remember that letting yourself get too tired can make your symptoms worse.  Listen to your body: if doing a certain activity increases your symptoms, take a break from that activity. Build up the amount of time you do an activity and stay under the threshold of symptoms. Long term Effects (Post-Concussive Syndrome)   Notify physician if any of the following persists longer than 2-4 weeks. Difficulty with concentration or attention (easily distracted)   Frequent headaches  Memory problems   Sensitivity to light   Sleeping difficulties  There is a higher risk of having a more serious head injury if:   Previous history of head injury or concussion   Taking medicine that thins your blood, or have a bleeding disorder   Have other neurologic (brain) problems   Have difficulty walking or frequent falls   Active in high impact contact sports, like soccer or football. Activities   Stay away from activities that could cause another head injury (like sports), until the doctor says it's okay. A second blow to the head can cause more damage to the brain   Limit reading, television, video games, etc. the first 48 hours. Your brain needs to rest so that it can recover. You may find that it helps to take time off school or work. Limit exposure to bright lights, loud noises, and crowds for the first 48 hours, as these can make your symptoms worse   Limit use of screens, such as an IPad, computer, cellphone, TV, etc, as these can make symptoms, especially headaches, worse. Work/School   It is recommended that you wait to return to work/school until after your follow-up appointment with trauma or your family doctor. If you are having no symptoms, please call for an earlier appointment   Some people find it hard to concentrate well so return to your normal activities slowly. Go back to work or school for half days at first, and increase as tolerated. Trauma services can help you with a graduated schedule. If you feel comfortable doing so, tell work or school about your concussion.  You may have to adjust your activities, depending on your job or school demands. Rest and Sleep   Rest for the first 24 hours; it's one of the best things to help your brain recover. It's okay to sleep if you want   You don't have to be woken up every few hours. If someone does wake you up, you should awaken easily. Do some light physical activity (housework) or light exercise (walking, stationary bike) as soon as you can tolerate the movement. Strenuous exercise (such as jogging or weight lifting) can make your concussion symptoms worse or last longer. Diet   Return to a normal diet as tolerated, you may want to start with liquids first   Eat healthy meals (including breakfast) and snacks throughout the day as your brain heals. Managing Pain   Tylenol or Ibuprofen are the best meds to take for headaches. Your doctor may have prescribed you medications if your headaches were severe or you have other injuries. Please take as directed. Driving   Your ability to concentrate and react quickly might be affected by the concussion. Please contact trauma services for advice on when to resume driving. Do not drive if you're concerned about vision problems, slowed thinking, slowed reaction time, reduced attention or poor judgment. Wear sunglasses even during winter if damaris while driving. The bright light may induce a headache. Alcohol use/Drug use   Don't drink alcohol or use recreational drugs as they may make you feel worse and/or hide the warning signs.    Follow-up: Trauma Clinic: [phone] -- press option 1442 [facility], [address] [place] [address] Manisha S       Follow up:   [address]  [phone]    As needed    Mel Rodas MD  [address] [place] [address] [phone]    Schedule an appointment as soon as possible for a visit in 2 weeks  Hospital Follow up    [address] [id number] [address] [facility]    [address] [facility]  [phone]           Signed:  Indra Street MD  11/24/2020  7:09 AM

## 2020-11-20 NOTE — ED NOTES
Bed: 14A-14  Expected date:   Expected time:   Means of arrival:   Comments:  transfer     Helder Chaparro RN  11/19/20 [id number]

## 2020-11-21 NOTE — PROGRESS NOTES
TRAUMA SURGERY  ATTENDING PROGRESS NOTE    CC:  Fall     S:  Just having back pain this morning, concerned her meds are not restarted. O:   @BP (!) 163/69   Pulse 99   Temp 98.9 °F (37.2 °C) (Temporal)   Resp 18   Ht 5' (1.524 m)   Wt 114 lb (51.7 kg)   SpO2 94%   BMI 22.26 kg/m² @    Gen - no apparent distress   Neuro - Awake, alert, attentive    HEENT - PERRL 3mm conj pink   Lungs - non labored, BS clear to auscultation b/l   Heart - RR, no obvious extra heart sounds    Abdomen - SNT no organomegaly   Spine -   Tender C/T spine   Ext- ROM WNL NVI all extremities     A/P:   S/p fall, with spine pain     Active Problems:    Fall from standing    Fracture of thoracic spine (HCC)    Closed fracture of cervical spine (HCC)  Resolved Problems:    * No resolved hospital problems. *      - C/T spine fx, NS to see they don't feel these are acute, appreciate input    - ?  Syncope, tele, trop neg, EKG, Echo   - Pain control SMI deep breathing   - home meds  - ok for diet   - PTOT placement     DVT prophylaxis: SCDs, Lovenox    Eugenie Brandt MD FACS

## 2020-11-21 NOTE — PROGRESS NOTES
Physical Therapy  Physical Therapy Initial Assessment     Name: Allie Tubbs  : 3/14/1933  MRN: [id number]    Referring Provider:  Fang Fox DO     Date of Service: 2020    Evaluating PT:  Shoshana Officer, PT, DPT. [id number]    Room #:  0140/0349-T  Diagnosis:  Fall from standing  Reason for admission:  Dizziness, fall   Precautions:  Falls, chronic spinal compression fxs no neuro intervention, spinal neutrality   Procedures: none  Equipment Recommendations:  FWW    SUBJECTIVE:  Pt lives alone in a 1 story home with 2 stair(s) to enter and 1 rail(s). Bed is on 1st floor and bath is on 1st floor. Pt ambulated with [employer] or cane depending on situation PTA. Pt independent for ADL performance. OBJECTIVE:   Initial Evaluation  Date:  Treatment   Short Term/ Long Term   Goals   AM-PAC 6 Clicks      Was pt agreeable to Eval/treatment? Yes      Does pt have pain? Back pain 8/10     Bed Mobility  Rolling: ModA  Supine to sit: ModA  Sit to supine: ModA  Scooting: ModA  Mod I    Transfers Sit to stand: Krzysztof  Stand to sit: Krzysztof  Stand pivot: Krzysztof  Independent    Ambulation    45 feet with [employer] Krzysztof    >150 feet with FWW Mod I    Stair negotiation: ascended and descended  NT  >2 steps with 1 rail Mod I   ROM BUE:  See OT eval   BLE:  WFL     Strength BUE:  See OT eval   BLE:  knee ext 5/5  Ankle DF 5/5  Increase by 1/3 MMT grade    Balance Sitting EOB:  SBA  Dynamic Standing:  Krzysztof  Sitting EOB:  indep  Dynamic Standing: Mod I [employer]     -Pt is A & O x 3.  Confusion/short term memory deficits in conversation.   -Sensation:  unremarkable   -Edema:  unremarkable     Therapeutic Exercises:  functional activity     Patient education  Pt educated on safety, sequencing of transfers, and role of PT    Patient response to education:   Pt verbalized understanding Pt demonstrated skill Pt requires further education in this area   Yes  No  Yes      ASSESSMENT:    Comments:  Pt received supine in bed and agreeable to PT session  Pt alert and oriented but did have some notable confusion/short term memory deficits during conversation. Pt educated on spinal neutrality with questionable understanding. Heavy assist needed for bed mobility maintaining spinal neutral. Ambulation completed with use of walker. Gait slow with mild unsteadiness needing cues for body positioning and walker management. Pt having difficulty understanding hand placement cues during sit<>stand. Returned to bed to end session. Pt with all needs met and call light in reach. Pt would benefit from continued PT POC to address functional deficits described above. Treatment:  Patient practiced and was instructed in the following treatment:     Patient education provided continuously throughout session for sequencing, safety maintenance, and improving any deficits found during the evaluation.  Bed mobility training - pt given verbal and tactile cues to facilitate proper sequencing and safety during rolling and supine>sit as well as provided with physical assistance to complete task     STS and pivot transfer training - pt educated on proper hand and foot placement, safety and sequencing, and use of proper technique to safely complete sit<>stand and pivot transfers with hands on assistance to complete task safely     Gait training- pt was given verbal and tactile cues to facilitate proper positioning and use of walker during ambulation as well as provided with physical assistance to complete task. Pt's/ family goals   1. None stated     Patient and or family understand(s) diagnosis, prognosis, and plan of care.   Yes     PLAN:    Current Treatment Recommendations   [] Strengthening     [] ROM   [x] Balance Training   [x] Endurance Training   [x] Transfer Training   [x] Gait Training   [x] Stair Training   [] Positioning   [x] Safety and Education Training   [] Patient/Caregiver Education   [] HEP  [] Other     Frequency of treatments: 2-5x/week x 1-2 weeks.    Time in  0820  Time out  0840    Total Treatment Time 10 minutes     Evaluation Time includes thorough review of current medical information, gathering information on past medical history/social history and prior level of function, completion of standardized testing/informal observation of tasks, assessment of data and education on plan of care and goals.     CPT codes:  [x] Low Complexity PT evaluation 26080  [] Moderate Complexity PT evaluation 20447  [] High Complexity PT evaluation 60222  [] PT Re-evaluation 87122  [] Gait training 81154 - minutes  [] Manual therapy 17929 - minutes  [x] Therapeutic activities 50907 10 minutes  [] Therapeutic exercises 11701 - minutes  [] Neuromuscular reeducation 31667 - minutes     Jennifer Zapata, PT, DPT  [id number]

## 2020-11-21 NOTE — PROGRESS NOTES
Perfect serve message sent to [facility] for possible resumption of home medications.

## 2020-11-21 NOTE — PROGRESS NOTES
Occupational Therapy  OCCUPATIONAL THERAPY INITIAL EVALUATION      Date:2020  Patient Name: Aly Hoffman  MRN: [id number]  : 3/14/1933  Room: [address] [place] [address]    Referring Provider:  Debora Rangel DO    Evaluating OT: Neto Pedroza OTR/L #[id number]     AM-PAC Daily Activity Raw Score: 15/24    Recommended Adaptive Equipment:  TBD     Diagnosis: Fall from standing     CT of the cervical spine demonstrated loss of height of C6 vertebral body concerning for possible fracture. And stable compression fracture of C7.    CT of the chest demonstrating comminuted fracture involving T11 vertebral body with mild retropulsion. Patient presented to the hospital following fall, dizziness and back pain. Cervical collar clearance per nursing. Pertinent Medical History:    Past Medical History:   Diagnosis Date    Anxiety     Arthritis     Back pain     Bilateral carotid bruits 2018    Blood circulation, collateral     Cancer of esophagus (HCC)     Carotid artery stenosis     Carotid bruit 2012    Carotid stenosis, right 2012    GERD (gastroesophageal reflux disease)     GERD (gastroesophageal reflux disease)     H/O burning pain in leg 2018    Hypertension     Leg swelling 2018    Lymphedema of lower extremity 2012    Mixed hyperlipidemia 2018    Peripheral vascular disease ([address]     Spider veins 2018    Varicose veins of right lower extremity with pain 2018        Precautions:  Falls, spine neutrality, bed alarm     Home Living: Pt lives alone in a 1 story home with 2 GAUTAM and 1 hand rail.  (laundry in basement - family assists)  Bathroom setup: walk-in shower with shower chair   Equipment owned: none    Prior Level of Function: mod I with ADLs , assist with IADLs; ambulated with w/w vs cane  Driving: no  Occupation: na    Pain Level: Pt reports 8/10 back pain this session; nursing notified; pt educated on spine neutrality.      Cognition: A&O: x3; Follows 1 step directions   Memory:  F-   Sequencing:  F-   Problem solving:  P+   Judgement/safety:  P+   + impulsivity, impaired insight into deficits      Functional Assessment:   Initial Eval Status  Date: 11/21/20 Treatment Status  Date: Short Term Goals = Long Term Goals  Treatment frequency: 1-4x/wk; PRN   Feeding Set-up  Independent    Grooming Minimal Assist     Seated / standing grooming tasks   Modified [place]    UB Dressing Minimal Assist   Modified [place]    LB Dressing Maximal Assist   Minimal Assist    Bathing Moderate Assist  Minimal Assist    Toileting Maximal Assist   Minimal Assist    Bed Mobility  Supine to sit: Moderate Assist   Sit to supine: Moderate Assist   Supine to sit: Stand by Assist   Sit to supine: Stand by Assist    Functional Transfers Minimal Assist   Supervision    Functional Mobility Minimal Assist     Ambulated in room with w/w; including to/from bathroom; cuing on posture, w/w management and safety  Supervision    Balance Sitting:     Static:  sup    Dynamic: min A  Standing: Min A     Activity Tolerance F-    light activity; limited due to pain and weakness  F+   Visual/  Perceptual Glasses: reading  wfl                  Hand dominance: right     Strength ROM Additional Info:    RUE  Grossly WFL formal MMT deferred due to spine neutrality  WFL fair  and wfl FMC/dexterity noted during ADL tasks     LUE Grossly WFL formal MMT deferred due to spine neutrality  WFL fair  and wfl FMC/dexterity noted during ADL tasks     Hearing: wfl  Sensation: denies numbness and tingling   Tone: wfl  Edema:none noted                             Comments: Upon arrival, patient supine in bed and agreeable to OT Session. Pt demonstrating P+ understanding of education/techniques, requiring additional training / education. At end of session, patient semi-supine in bed with call light and phone within reach.   Pt would benefit from continued skilled OT to increase safety, independence and quality of life. Treatment:  OT services provided: Therapist educated pt regarding role of OT and spinal neutrality. Therapist facilitated bed mobility utilizing log roll technique, sitting balance at EOB, standing balance tasks, functional transfers (sit to stands, stand pivot; bed, toilet, chair) and functional ambulation with w/w in room including to/from bathroom - skilled cuing on sequencing, hand placement, body mechanics and safety. Therapist facilitated self-care retraining: UB/LB self-care tasks (gown, socks, brief; cuing on technique within spine neutrality), toileting task and seated / standing grooming task while educating pt on modified techniques within precautions, posture, safety and energy conservation techniques. Skilled monitoring of HR, O2 sats and pts response to treatment.           Assessment of current deficits    Functional mobility [x]  ADLs [x] Strength [x]  Cognition [x]  Functional transfers  [x] IADLs [x] Safety Awareness [x]  Endurance [x]  Fine Motor Coordination [] Balance [x] Vision/perception [] Sensation []   Gross Motor Coordination [] ROM [] Delirium []                  Motor Control []      Plan of Care:  1-4 days/wk for 1-2 weeks PRN   Instruction/training on adapted ADL techniques and AE recommendations to increase functional independence within precautions  Training on energy conservation strategies/techniques to improve independence/tolerance for self-care routine  Functional transfer/mobility training/DME recommendations for increased independence, safety, and fall prevention  Patient/Family education to increase follow through with safety techniques and functional independence  Recommendation of environmental modifications for increased safety with functional transfers/mobility and ADLs  Therapeutic exercise to improve motor endurance, ROM, and functional strength for ADLs/functional transfers  Therapeutic activities to facilitate/challenge dynamic balance, stand tolerance, fine motor dexterity/in-hand manipulation for increased independence with ADLs  Positioning to improve functional independence      Rehab Potential: Good for established goals     Patient / Family Goal: none stated    Patient and/or family were instructed on functional diagnosis, prognosis/goals and OT plan of care. Demonstrated fair understanding. AM-PAC Daily Activity Inpatient   How much help for putting on and taking off regular lower body clothing?: A Lot  How much help for Bathing?: A Lot  How much help for Toileting?: A Lot  How much help for putting on and taking off regular upper body clothing?: A Little  How much help for taking care of personal grooming?: A Little  How much help for eating meals?: A Little  AM-PAC Inpatient Daily Activity Raw Score: 15  AM-PAC Inpatient ADL T-Scale Score : 34.69  ADL Inpatient CMS 0-100% Score: 56.46  ADL Inpatient CMS G-Code Modifier : CK         Eval Complexity: mod  Profile and History- med (extensive chart review)  Assessment of Occupational Performance and Identification of Deficits- med  Clinical Decision Making- med     Time In: 755  Time Out: 825  Total Treatment Time: 23 minutes    Min Units   OT Eval Low 29814       OT Eval Medium 97166  x 1   OT Eval High 83982       OT Re-Eval [id number]       Therapeutic Ex 39887       Therapeutic Activities 57196  9  1   ADL/Self Care 98866  14  1   Orthotic Management 55201       Neuro Re-Ed 16066       Non-Billable Time          Evaluation Time includes thorough review of current medical information, gathering information on past medical history/social history and prior level of function, completion of standardized testing/informal observation of tasks, assessment of data and education on plan of care and goals.            Shy Reyes OTR/L #[id number]

## 2020-11-21 NOTE — PLAN OF CARE
Problem: Falls - Risk of:  Goal: Will remain free from falls  Description: Will remain free from falls  11/21/2020 8660 by Nicolle Koenig RN  Outcome: Met This Shift  11/20/2020 1711 by Pasquale Guerrero RN  Outcome: Met This Shift  Goal: Absence of physical injury  Description: Absence of physical injury  11/21/2020 4013 by Nicolle Koenig RN  Outcome: Met This Shift  11/20/2020 1711 by Pasquale Guerrero RN  Outcome: Met This Shift

## 2020-11-22 NOTE — PROGRESS NOTES
TRAUMA SURGERY  ATTENDING PROGRESS NOTE    CC:  Fall     S:  Resting, slightly confused this AM.     O:   @BP (!) 179/84   Pulse 98   Temp 97.6 °F (36.4 °C) (Temporal)   Resp 20   Ht 5' (1.524 m)   Wt 114 lb (51.7 kg)   SpO2 92%   BMI 22.26 kg/m² @    Gen - no apparent distress   Neuro - sleeping slightly confused,   HEENT - PERRL 3mm conj pink   Lungs - non labored, BS clear to auscultation b/l   Heart - RR, no obvious extra heart sounds    Abdomen - SNT no organomegaly   Spine -   Tender C/T spine   Ext- ROM WNL NVI all extremities     A/P:   S/p fall, with spine pain     Active Problems:    Fall from standing    Fracture of thoracic spine (HCC)    Closed fracture of cervical spine (HCC)  Resolved Problems:    * No resolved hospital problems. *      - C/T spine fx, NS to see they don't feel these are acute, appreciate input    - ?  Syncope, tele, trop neg, EKG, Echo ok,   - Pain control SMI deep breathing   - home meds  - ok for diet   - PTOT placement     DVT prophylaxis: Amrik Power MD FACS

## 2020-11-22 NOTE — PLAN OF CARE
Problem: Falls - Risk of:  Goal: Will remain free from falls  Description: Will remain free from falls  Outcome: Met This Shift  Goal: Absence of physical injury  Description: Absence of physical injury  Outcome: Met This Shift     Problem: Pain:  Goal: Pain level will decrease  Description: Pain level will decrease  Outcome: Met This Shift  Goal: Control of acute pain  Description: Control of acute pain  Outcome: Met This Shift  Goal: Control of chronic pain  Description: Control of chronic pain  Outcome: Met This Shift     Problem: Musculor/Skeletal Functional Status  Goal: Highest potential functional level  Outcome: Met This Shift  Goal: Absence of falls  Outcome: Met This Shift

## 2020-11-23 NOTE — PROGRESS NOTES
OT BEDSIDE TREATMENT NOTE      Date:2020  Patient Name: Jaci Mcghee  MRN: [id number]  : 3/14/1933  Room: [address] [place] [address]     Per OT Eval:    Referring Provider:  Sheryle Scotland, DO     Evaluating OT: Nhung Birmingham OTR/L #[id number]      AM-PAC Daily Activity Raw Score: 15/24     Recommended Adaptive Equipment:  TBD      Diagnosis: Fall from standing                CT of the cervical spine demonstrated loss of height of C6 vertebral body concerning for possible fracture.  And stable compression fracture of C7.               CT of the chest demonstrating comminuted fracture involving T11 vertebral body with mild retropulsion. Patient presented to the hospital following fall, dizziness and back pain.  Cervical collar clearance per nursing.         Pertinent Medical History:    Past Medical History        Past Medical History:   Diagnosis Date    Anxiety      Arthritis      Back pain      Bilateral carotid bruits 2018    Blood circulation, collateral      Cancer of esophagus (HCC)      Carotid artery stenosis      Carotid bruit 2012    Carotid stenosis, right 2012    GERD (gastroesophageal reflux disease)      GERD (gastroesophageal reflux disease)      H/O burning pain in leg 2018    Hypertension      Leg swelling 2018    Lymphedema of lower extremity 2012    Mixed hyperlipidemia 2018    Peripheral vascular disease ([address]      Spider veins 2018    Varicose veins of right lower extremity with pain 2018         Precautions:  Falls, spine neutrality, bed alarm     Home Living: Pt lives alone in a 1 story home with 2 GAUTAM and 1 hand rail.  (laundry in basement - family assists)  Bathroom setup: walk-in shower with shower chair   Equipment owned: none     Prior Level of Function: mod I with ADLs , assist with IADLs; ambulated with w/w vs cane  Driving: no  Occupation: na     Pain Level: Pt reports 8/10 back pain with certain movements, pain medication on board, pt educated on spine neutrality. Cognition: A&O: x3; Follows 1-2 step directions, pleasant & cooperative, talkative & easily distracted off task               Memory:  F-              Sequencing:  F-              Problem solving: Fair-              Judgement/safety: Fair- mild impulsivity, impaired insight into deficits                 Functional Assessment:    Initial Eval Status  Date: 11/21/20 Treatment Status  Date:  11/23/20 Short Term Goals = Long Term Goals  Treatment frequency: 1-4x/wk; PRN   Feeding Set-up  Indep  Seated in chair  Independent    Grooming Minimal Assist      Seated / standing grooming tasks   Min A  Grooming hair seated Modified [place]    UB Dressing Minimal Assist   Min A  Donning robe  Modified [place]    LB Dressing Maximal Assist  Mod A  Simulated pants,  SBA doffing & donning socks chair level  Minimal Assist    Bathing Moderate Assist  Mod A  simulated Minimal Assist    Toileting Maximal Assist  Max A   Minimal Assist    Bed Mobility  Supine to sit: Moderate Assist   Sit to supine:  Moderate Assist   Min A  Cues for spinal neutrality to limit pain  Supine to sit: Stand by Assist   Sit to supine: Stand by Assist    Functional Transfers Minimal Assist   Min A  Cues for hand placement & safety Supervision    Functional Mobility Minimal Assist      Ambulated in room with w/w; including to/from bathroom; cuing on posture, w/w management and safety Min A  With walker, short household distance  Supervision    Balance Sitting:     Static:  sup    Dynamic: min A  Standing: Min A  Sitting:   Static: SBA  Dynamic: Min A  Standing: Min A       Activity Tolerance F-     light activity; limited due to pain and weakness  Fair  Moderate tasks, initially reported dizziness, which diminished, BP WFL & unchanged with sitting or standing  F+   Visual/  Perceptual Glasses: reading  wfl                      Education:  Pt was educated through out treatment regarding proper technique & safety with bed mobility, functional transfers & mobility, walker management, spinal neutrality & ADL compensatory strategies to ease tasks to improve safety & prevent falls and allow pt to return home safely. Comments: Upon arrival pt was in bed & agreeable for therapy. At end of session pt was seated in chair, tray table in front, all lines and tubes intact, call light within reach. · Pt has made Fair progress towards set goals. · Continue with current plan of care    Treatment Time In: 11:45           Treatment Time Out: 12:15              Treatment Charges: Mins Units   Ther Ex  64970     Manual Therapy [address] [facility]     Thera Activities 92919 20 1   ADL/Home Mgt 66922 10 1   Neuro Re-ed 00385     Group Therapy      Orthotic manage/training  24925     Non-Billable Time     Total Timed Treatment 30 2       Silvia LONDONO  [address] [place] [address], [address] [place] [address]

## 2020-11-23 NOTE — CARE COORDINATION
Chart reviewed. Patient alert to person and place only at this time. Call placed to patient's daughter Go Mast, no answer,  left with number for return call. Call also placed to the patient's son Cyril Campbell to discuss transition of care planning. Patient lives alone in a ranch style home with 5 step entry. Patient has a [employer], Cane, and Shower chair at home as well as handrails on the stairs and in the shower. Patient's PCP is Dr Despina Siemens and she uses [address] [place] [address] on [facility] for her medications. Discussed therapy input and explained that the patient will likely need rehab prior to returning home. Cyril Campbell is in agreement and stated that the patient had been to [facility] before and was agreeable for transition to that facility for rehab. Call placed to [facility], liaison with [facility] and referral made. Return call received from patient's daughter Go Mast and she would prefer [facility] or [facility] as the patient was not fond of [facility]. Call placed to Go Mast, liaison with [facility]. Detailed  left with information for referral and number for return call. Call also placed to Shai, liaison with [facility]. They are not accepting patient's at this time. Await return call from Go Mast re: acceptance for [facility] re: acceptance to [facility]. Will continue to follow.      Juan C Meehan  P:  [phone]

## 2020-11-23 NOTE — DISCHARGE INSTR - DIET

## 2020-11-23 NOTE — DISCHARGE INSTR - COC
Intake 180 ml   Output --   Net 180 ml     I/O last 3 completed shifts: In: 5 [P.O.:420]  Out: -     Safety Concerns:     Sundowners [place] and At Risk for Falls    Impairments/Disabilities:      None    Nutrition Therapy:  Current Nutrition Therapy:   - Oral Diet:  General    Routes of Feeding: Oral  Liquids: No Restrictions  Daily Fluid Restriction: no  Last Modified Barium Swallow with Video (Video Swallowing Test): not done    Treatments at the Time of Hospital Discharge:   Respiratory Treatments: ***  Oxygen Therapy:  is not on home oxygen therapy. Ventilator:    - No ventilator support    Rehab Therapies: Physical Therapy and Occupational Therapy  Weight Bearing Status/Restrictions: No weight bearing restirctions  Other Medical Equipment (for information only, NOT a DME order):  walker  Other Treatments: ***    Patient's personal belongings (please select all that are sent with patient):  Glasses, Dentures upper and lower, Jewelry, clothes    RN SIGNATURE:  Electronically signed by Ulises Murcia RN on 11/23/20 at 7:21 PM EST    CASE MANAGEMENT/SOCIAL WORK SECTION    Inpatient Status Date: ***    Readmission Risk Assessment Score:  Readmission Risk              Risk of Unplanned Readmission:        11           Discharging to Facility/ Agency   · Name:   · Address:  · Phone:  · Fax:    Dialysis Facility (if applicable)   · Name:  · Address:  · Dialysis Schedule:  · Phone:  · Fax:    / signature: {Esignature:[id number]:::0}    PHYSICIAN SECTION    Prognosis: Fair    Condition at Discharge: Stable    Rehab Potential (if transferring to Rehab): Fair    Recommended Labs or Other Treatments After Discharge:  Follow up with Neurosurgery in 2-3 weeks and Trauma Surgery as needed    Physician Certification: I certify the above information and transfer of Comfort Montoya  is necessary for the continuing treatment of the diagnosis listed and that she requires Acute Rehab for less 30 days.     Update Admission H&P: No change in H&P    PHYSICIAN SIGNATURE:  Electronically signed by Gaby Viera MD on 11/23/20 at 2:32 PM EST

## 2020-11-23 NOTE — PROGRESS NOTES
Physical Therapy  Treatment Note    Name: Brendan Sagastume  : 3/14/1933  MRN: [id number]    Referring Provider:  Abdoul Haji DO     Date of Service: 2020    Evaluating PT:  Darin Bob PT, DPT. [id number]    Room #:  7237/6728-X  Diagnosis:  Fall from standing  Reason for admission:  Dizziness, fall   Precautions:  Falls, chronic spinal compression fxs no neuro intervention, spinal neutrality   Procedures: none  Equipment Recommendations:  FWW    SUBJECTIVE:  Pt lives alone in a 1 story home with 2 stair(s) to enter and 1 rail(s). Bed is on 1st floor and bath is on 1st floor. Pt ambulated with [employer] or cane depending on situation PTA. Pt independent for ADL performance. OBJECTIVE:   Initial Evaluation  Date:  Treatment   Short Term/ Long Term   Goals   AM-PAC 6 Clicks  61/18    Was pt agreeable to Eval/treatment? Yes  Yes    Does pt have pain? Back pain 8/10 Back pain 8-9/10    Bed Mobility  Rolling: ModA  Supine to sit: ModA  Sit to supine: ModA  Scooting: ModA Rolling: Min A  Supine to sit: Min A  Sit to supine: NT  Scooting: Min A  Mod I    Transfers Sit to stand: Krzysztof  Stand to sit: Krzysztof  Stand pivot: Krzysztof Sit to stand: Min A  Stand to sit: Min A  Stand pivot: Min A  Independent    Ambulation    45 feet with [employer] Krzysztof   5, 50 feet using [employer] with CGA<>Min A >150 feet with FWW Mod I    Stair negotiation: ascended and descended  NT NT >2 steps with 1 rail Mod I   ROM BUE:  See OT eval   BLE:  WFL BLE: WFL    Strength BUE:  See OT eval   BLE:  knee ext 5/5  Ankle DF 5/5 BLE: WFL Increase by 1/3 MMT grade    Balance Sitting EOB:  SBA  Dynamic Standing:  Krzysztof Sitting EOB:  SBA  Dynamic Standing:  Krzysztof Sitting EOB:  indep  Dynamic Standing: Mod I [employer]     -Pt is A & O x 4.  Confusion/short term memory deficits in conversation.   -Sensation:  unremarkable   -Edema:  Unremarkable    Vitals:     BP sittin/67     HR 94   BP standin/65        Therapeutic Exercises:  functional activity     Patient education  Pt educated on safety, sequencing of transfers, waiting between position changes and role of PT    Patient response to education:   Pt verbalized understanding Pt demonstrated skill Pt requires further education in this area   Yes  No  Yes      ASSESSMENT:    Comments:  Pt received supine in bed and agreeable to PT session. Pt educated on spinal precautions and log roll for bed mobility. Pt c/o increased back pain with all activity. Pt c/o lightheadedness with position changes and during amb initially. BP assessed is sitting and standing and was WNL. Pt had 1 instance of R knee buckle that required assist to correct LOB. Pt with no awareness of event. Pt returned to bedside and was left sitting up with call button within reach and all needs met. Treatment:  Patient practiced and was instructed in the following treatment:     Patient education provided continuously throughout session for sequencing, safety maintenance, and improving any deficits found during the evaluation.  Bed mobility training - pt given verbal and tactile cues to facilitate proper sequencing and safety during rolling and supine>sit as well as provided with physical assistance to complete task     STS and pivot transfer training - pt educated on proper hand and foot placement, safety and sequencing, and use of proper technique to safely complete sit<>stand and pivot transfers with hands on assistance to complete task safely     Gait training- pt was given verbal and tactile cues to facilitate proper positioning and use of walker during ambulation as well as provided with physical assistance to complete task. PLAN:  Patient is making good progress towards PT goals. Will continue with current POC.       Current Treatment Recommendations   [] Strengthening     [] ROM   [x] Balance Training   [x] Endurance Training   [x] Transfer Training   [x] Gait Training   [x] Stair Training   [] Positioning [x] Safety and Education Training   [] Patient/Caregiver Education   [] HEP  [] Other     Time in  1513  Time out  4578    Total Treatment Time 25 minutes     CPT codes:  [] Low Complexity PT evaluation 62503  [] Moderate Complexity PT evaluation 97827  [] High Complexity PT evaluation 02496  [] PT Re-evaluation 29657  [] Gait training 51630 - minutes  [] Manual therapy 11435 - minutes  [x] Therapeutic activities 24487 25 minutes  [] Therapeutic exercises 21904 - minutes  [] Neuromuscular reeducation 68907 - minutes     Kinsey Matias, PT, DPT  License [id number]

## 2020-12-17 NOTE — PROGRESS NOTES
Chief Complaint:   Chief Complaint   Patient presents with    Circulatory Problem     carotid         HPI: Patient came to the office, for 2 years, for the evaluation of multiple vascular issues including history of right carotid stenosis, varicose veins of right lower extremity, in the past recommended to wear light compression stockings, patient unable to tolerate them and wear them    Patient also recently had a history of fall, recuperating, doing well      Patient denies any focal lateralizing neurological symptoms like loss of speech, vision or loss of function of extremity    Patient can walk  short distance around the house without difficulties, sometimes uses a cane, and denies any symptoms of rest pain    Allergies   Allergen Reactions    Codeine     Vicodin [Hydrocodone-Acetaminophen] Nausea Only       Current Outpatient Medications   Medication Sig Dispense Refill    docusate sodium (COLACE) 100 MG capsule Take 100 mg by mouth 2 times daily      ferrous sulfate (IRON 325) 325 (65 Fe) MG tablet Take 325 mg by mouth daily (with breakfast)      losartan (COZAAR) 100 MG tablet Take 100 mg by mouth daily      traMADol (ULTRAM) 50 MG tablet Take 50 mg by mouth every 6 hours as needed for Pain.  lidocaine 4 % external patch Place 1 patch onto the skin daily 1 box 0    gabapentin (NEURONTIN) 100 MG capsule Take 1 capsule by mouth 3 times daily for 30 days.  90 capsule 0    acetaminophen (TYLENOL) 325 MG tablet Take 650 mg by mouth every 6 hours as needed for Pain      irbesartan (AVAPRO) 300 MG tablet Take 300 mg by mouth daily      pantoprazole (PROTONIX) 40 MG tablet Take 40 mg by mouth daily      carboxymethylcellulose 1 % ophthalmic solution Place 1 drop into the right eye 3 times daily      Menthol, Topical Analgesic, (BIOFREEZE) 4 % GEL Apply 1 each topically 3 times daily      traZODone (DESYREL) 50 MG tablet Take 1.5 tablets by mouth nightly 30 tablet 1    vitamin B-12 (CYANOCOBALAMIN) 1000 MCG tablet Take 1,000 mcg by mouth Twice a Week Given Tuesday, Thursday      vitamin D (CHOLECALCIFEROL) 1000 UNIT TABS tablet Take 1,000 Units by mouth Twice a Week Given Monday,Wednesday      vitamin E 400 UNIT capsule Take 400 Units by mouth Twice a Week Given Saturday,Sunday      Multiple Vitamins-Minerals (PRESERVISION AREDS 2) CAPS Take 1 capsule by mouth 2 times daily       Coenzyme Q10 (COQ10) 100 MG CAPS Take 100 mg by mouth daily        No current facility-administered medications for this visit. Past Medical History:   Diagnosis Date    Anxiety     Arthritis     Back pain     Bilateral carotid bruits 11/28/2018    Blood circulation, collateral     Cancer of esophagus (HCC)     Carotid artery stenosis     Carotid bruit 9/20/2012    Carotid stenosis, right 9/20/2012    GERD (gastroesophageal reflux disease)     GERD (gastroesophageal reflux disease)     H/O burning pain in leg 11/28/2018    Hypertension     Leg swelling 11/28/2018    Lymphedema of lower extremity 9/20/2012    Mixed hyperlipidemia 8/13/2018    Peripheral vascular disease ([address]     Spider veins 11/28/2018    Varicose veins of right lower extremity with pain 11/28/2018       Past Surgical History:   Procedure Laterality Date    CARDIAC SURGERY      heart catherization in early 1970's    COLONOSCOPY      ECHO COMPL W DOP COLOR FLOW  11/6/2011         ESOPHAGUS SURGERY      HYSTERECTOMY      UPPER GASTROINTESTINAL ENDOSCOPY  11-2-2011    UPPER GASTROINTESTINAL ENDOSCOPY  05/2017       Family History   Problem Relation Age of Onset    Heart Disease Sister     Cancer Brother     Cancer Brother     Early Death Brother        Social History     Socioeconomic History    Marital status:       Spouse name: Not on file    Number of children: Not on file    Years of education: Not on file    Highest education level: Not on file   Occupational History    Not on file   Social Needs    Financial resource strain: Not on file    Food insecurity     Worry: Not on file     Inability: Not on file    Transportation needs     Medical: Not on file     Non-medical: Not on file   Tobacco Use    Smoking status: Never Smoker    Smokeless tobacco: Never Used   Substance and Sexual Activity    Alcohol use: No     Alcohol/week: 0.0 standard drinks    Drug use: No    Sexual activity: Not Currently   Lifestyle    Physical activity     Days per week: Not on file     Minutes per session: Not on file    Stress: Not on file   Relationships    Social connections     Talks on phone: Not on file     Gets together: Not on file     Attends [employer]ism service: Not on file     Active member of club or organization: Not on file     Attends meetings of clubs or organizations: Not on file     Relationship status: Not on file    Intimate partner violence     Fear of current or ex partner: Not on file     Emotionally abused: Not on file     Physically abused: Not on file     Forced sexual activity: Not on file   Other Topics Concern    Not on file   Social History Narrative    Not on file       Review of Systems:    Eyes:  No blurring, diplopia or vision loss. Respiratory:  No cough, pleuritic chest pain, dyspnea, or wheezing. Cardiovascular: No angina, palpitations . Valvular heart disease noted, hypertension  Musculoskeletal:  No arthritis or weakness. Lumbar sacral arthritis  Neurologic:  No paralysis, paresis, paresthesia, seizures or headache. Physical Exam:  General appearance:  Alert, awake, oriented x 3. No distress. Eyes:  Conjunctivae appear normal; PERRL  Neck:  No jugular venous distention, lymphadenopathy or thyromegaly. Carotid bruit noted  Lungs:  Clear to ausculation bilaterally. No rhonchi, crackles, wheezes  Heart:  Regular rate and rhythm. No rub or murmur  Abdomen:  Soft, non-tender. No masses, organomegaly.   Musculoskeletal : No joint effusions, tenderness swelling    Neuro: Speech is intact. Moving all extremities. No focal motor or sensory deficits      Extremities:  Both feet are warm to touch. The color of both feet is normal.    Moderate varicose veins of the right leg noted with mild swelling without any tenderness of the calf    Pulses Right  Left    Brachial 3 3    Radial    3=normal   Femoral 2 2  2=diminished   Popliteal    1=barely palpable   Dorsalis pedis    0=absent   Posterior tibial    4=aneurysmal             Other pertinent information:1. The past medical records were reviewed. Assessment:    1. Carotid stenosis, right    2. Bilateral carotid bruits    3. Varicose veins of right lower extremity with pain    4. Spider veins              Plan:       Discussed with patient, regarding all options, for the varicose veins, recommend consider therapy, consider wearing light compression stockings possible, consider carotid ultrasound every 2 years, call immediately if any focal lateralizing neurological symptoms, explained              Patient was instructed to continue walking program and to call if any worsening of symptoms and to call if any focal lateralizing neurological symptoms like loss of speech, vision or loss of function of extremity. All the questions were answered. Orders Placed This Encounter   Procedures    US CAROTID ARTERY BILATERAL             Indicated follow-up: Return in about 2 years (around 12/17/2022), or if symptoms worsen or fail to improve.

## 2021-01-01 ENCOUNTER — TELEPHONE (OUTPATIENT)
Dept: NEUROSURGERY | Age: 86
End: 2021-01-01

## 2021-01-01 ENCOUNTER — INITIAL CONSULT (OUTPATIENT)
Dept: GERIATRIC MEDICINE | Age: 86
End: 2021-01-01
Payer: MEDICARE

## 2021-01-01 ENCOUNTER — TELEPHONE (OUTPATIENT)
Dept: NEUROLOGY | Age: 86
End: 2021-01-01

## 2021-01-01 ENCOUNTER — OFFICE VISIT (OUTPATIENT)
Dept: NEUROSURGERY | Age: 86
End: 2021-01-01
Payer: MEDICARE

## 2021-01-01 ENCOUNTER — HOSPITAL ENCOUNTER (INPATIENT)
Age: 86
LOS: 5 days | Discharge: HOSPICE | DRG: 291 | End: 2021-09-02
Attending: EMERGENCY MEDICINE | Admitting: INTERNAL MEDICINE
Payer: MEDICARE

## 2021-01-01 ENCOUNTER — HOSPITAL ENCOUNTER (OUTPATIENT)
Dept: GENERAL RADIOLOGY | Age: 86
Discharge: HOME | End: 2021-05-07
Payer: MEDICARE

## 2021-01-01 ENCOUNTER — OFFICE VISIT (OUTPATIENT)
Dept: NEUROLOGY | Age: 86
End: 2021-01-01
Payer: MEDICARE

## 2021-01-01 ENCOUNTER — APPOINTMENT (OUTPATIENT)
Dept: GENERAL RADIOLOGY | Age: 86
DRG: 291 | End: 2021-01-01
Payer: MEDICARE

## 2021-01-01 ENCOUNTER — HOSPITAL ENCOUNTER (OUTPATIENT)
Dept: GENERAL RADIOLOGY | Age: 86
Discharge: HOME | End: 2021-01-08
Payer: MEDICARE

## 2021-01-01 ENCOUNTER — HOSPITAL ENCOUNTER (OUTPATIENT)
Dept: MRI IMAGING | Age: 86
Discharge: HOME | End: 2021-01-20
Payer: MEDICARE

## 2021-01-01 ENCOUNTER — HOSPITAL ENCOUNTER (OUTPATIENT)
Dept: GENERAL RADIOLOGY | Age: 86
Discharge: HOME | End: 2021-03-05
Payer: MEDICARE

## 2021-01-01 ENCOUNTER — HOSPITAL ENCOUNTER (OUTPATIENT)
Age: 86
Discharge: HOME | End: 2021-01-08
Payer: MEDICARE

## 2021-01-01 ENCOUNTER — TELEPHONE (OUTPATIENT)
Dept: GERIATRIC MEDICINE | Age: 86
End: 2021-01-01

## 2021-01-01 ENCOUNTER — HOSPITAL ENCOUNTER (OUTPATIENT)
Age: 86
Discharge: HOME | End: 2021-05-07
Payer: MEDICARE

## 2021-01-01 ENCOUNTER — TELEPHONE (OUTPATIENT)
Dept: VASCULAR SURGERY | Age: 86
End: 2021-01-01

## 2021-01-01 ENCOUNTER — HOSPITAL ENCOUNTER (OUTPATIENT)
Age: 86
Discharge: HOME | End: 2021-03-05
Payer: MEDICARE

## 2021-01-01 ENCOUNTER — CARE COORDINATION (OUTPATIENT)
Dept: CASE MANAGEMENT | Age: 86
End: 2021-01-01

## 2021-01-01 ENCOUNTER — APPOINTMENT (OUTPATIENT)
Dept: MRI IMAGING | Age: 86
DRG: 291 | End: 2021-01-01
Payer: MEDICARE

## 2021-01-01 VITALS
DIASTOLIC BLOOD PRESSURE: 79 MMHG | HEART RATE: 78 BPM | BODY MASS INDEX: 23.18 KG/M2 | SYSTOLIC BLOOD PRESSURE: 138 MMHG | WEIGHT: 115 LBS | TEMPERATURE: 98.1 F | HEIGHT: 59 IN

## 2021-01-01 VITALS
HEIGHT: 59 IN | RESPIRATION RATE: 18 BRPM | BODY MASS INDEX: 23.18 KG/M2 | WEIGHT: 115 LBS | DIASTOLIC BLOOD PRESSURE: 71 MMHG | SYSTOLIC BLOOD PRESSURE: 139 MMHG | HEART RATE: 95 BPM | OXYGEN SATURATION: 95 %

## 2021-01-01 VITALS
TEMPERATURE: 97.1 F | WEIGHT: 110.1 LBS | DIASTOLIC BLOOD PRESSURE: 90 MMHG | SYSTOLIC BLOOD PRESSURE: 168 MMHG | HEART RATE: 84 BPM | RESPIRATION RATE: 16 BRPM | BODY MASS INDEX: 22.2 KG/M2 | HEIGHT: 59 IN

## 2021-01-01 VITALS
BODY MASS INDEX: 21.78 KG/M2 | SYSTOLIC BLOOD PRESSURE: 118 MMHG | TEMPERATURE: 99.2 F | HEIGHT: 57 IN | RESPIRATION RATE: 16 BRPM | WEIGHT: 100.97 LBS | OXYGEN SATURATION: 95 % | HEART RATE: 126 BPM | DIASTOLIC BLOOD PRESSURE: 67 MMHG

## 2021-01-01 VITALS
BODY MASS INDEX: 22.98 KG/M2 | WEIGHT: 114 LBS | DIASTOLIC BLOOD PRESSURE: 72 MMHG | HEART RATE: 62 BPM | SYSTOLIC BLOOD PRESSURE: 146 MMHG | HEIGHT: 59 IN

## 2021-01-01 VITALS
DIASTOLIC BLOOD PRESSURE: 77 MMHG | HEART RATE: 85 BPM | HEIGHT: 59 IN | TEMPERATURE: 98 F | BODY MASS INDEX: 22.98 KG/M2 | WEIGHT: 114 LBS | SYSTOLIC BLOOD PRESSURE: 144 MMHG

## 2021-01-01 VITALS
OXYGEN SATURATION: 97 % | SYSTOLIC BLOOD PRESSURE: 124 MMHG | HEART RATE: 80 BPM | WEIGHT: 114 LBS | TEMPERATURE: 97.5 F | DIASTOLIC BLOOD PRESSURE: 71 MMHG | RESPIRATION RATE: 18 BRPM | BODY MASS INDEX: 23.03 KG/M2

## 2021-01-01 DIAGNOSIS — M48.061 SPINAL STENOSIS OF LUMBAR REGION, UNSPECIFIED WHETHER NEUROGENIC CLAUDICATION PRESENT: ICD-10-CM

## 2021-01-01 DIAGNOSIS — F41.9 ANXIETY: Primary | ICD-10-CM

## 2021-01-01 DIAGNOSIS — S22.080A CLOSED WEDGE COMPRESSION FRACTURE OF T11 VERTEBRA, INITIAL ENCOUNTER (HCC): ICD-10-CM

## 2021-01-01 DIAGNOSIS — I50.9 ACUTE DECOMPENSATED HEART FAILURE (HCC): Primary | ICD-10-CM

## 2021-01-01 DIAGNOSIS — S22.080A CLOSED WEDGE COMPRESSION FRACTURE OF T11 VERTEBRA, INITIAL ENCOUNTER (HCC): Primary | ICD-10-CM

## 2021-01-01 DIAGNOSIS — G30.0 EARLY ONSET ALZHEIMER'S DEMENTIA WITHOUT BEHAVIORAL DISTURBANCE (HCC): Primary | ICD-10-CM

## 2021-01-01 DIAGNOSIS — F02.818 EARLY ONSET ALZHEIMER'S DISEASE WITH BEHAVIORAL DISTURBANCE (HCC): Primary | ICD-10-CM

## 2021-01-01 DIAGNOSIS — Z87.81 HX OF COMPRESSION FRACTURE OF SPINE: ICD-10-CM

## 2021-01-01 DIAGNOSIS — S22.009D CLOSED FRACTURE OF THORACIC VERTEBRA WITH ROUTINE HEALING, UNSPECIFIED FRACTURE MORPHOLOGY, UNSPECIFIED THORACIC VERTEBRAL LEVEL, SUBSEQUENT ENCOUNTER: ICD-10-CM

## 2021-01-01 DIAGNOSIS — F02.80 EARLY ONSET ALZHEIMER'S DEMENTIA WITHOUT BEHAVIORAL DISTURBANCE (HCC): Primary | ICD-10-CM

## 2021-01-01 DIAGNOSIS — M54.40 ACUTE RIGHT-SIDED LOW BACK PAIN WITH SCIATICA, SCIATICA LATERALITY UNSPECIFIED: ICD-10-CM

## 2021-01-01 DIAGNOSIS — Z87.81 HX OF COMPRESSION FRACTURE OF SPINE: Primary | ICD-10-CM

## 2021-01-01 DIAGNOSIS — S12.9XXD CLOSED FRACTURE OF CERVICAL VERTEBRA, UNSPECIFIED CERVICAL VERTEBRAL LEVEL, SUBSEQUENT ENCOUNTER: ICD-10-CM

## 2021-01-01 DIAGNOSIS — J96.01 ACUTE RESPIRATORY FAILURE WITH HYPOXIA (HCC): ICD-10-CM

## 2021-01-01 DIAGNOSIS — J90 PLEURAL EFFUSION, LEFT: ICD-10-CM

## 2021-01-01 DIAGNOSIS — G30.0 EARLY ONSET ALZHEIMER'S DISEASE WITH BEHAVIORAL DISTURBANCE (HCC): Primary | ICD-10-CM

## 2021-01-01 DIAGNOSIS — M54.40 ACUTE RIGHT-SIDED LOW BACK PAIN WITH SCIATICA, SCIATICA LATERALITY UNSPECIFIED: Primary | ICD-10-CM

## 2021-01-01 LAB
ADENOVIRUS BY PCR: NOT DETECTED
ALBUMIN SERPL-MCNC: 3.4 G/DL (ref 3.5–5.2)
ALP BLD-CCNC: 113 U/L (ref 35–104)
ALT SERPL-CCNC: 15 U/L (ref 0–32)
ANION GAP SERPL CALCULATED.3IONS-SCNC: 10 MMOL/L (ref 7–16)
ANION GAP SERPL CALCULATED.3IONS-SCNC: 10 MMOL/L (ref 7–16)
ANION GAP SERPL CALCULATED.3IONS-SCNC: 11 MMOL/L (ref 7–16)
ANION GAP SERPL CALCULATED.3IONS-SCNC: 11 MMOL/L (ref 7–16)
ANION GAP SERPL CALCULATED.3IONS-SCNC: 9 MMOL/L (ref 7–16)
AST SERPL-CCNC: 20 U/L (ref 0–31)
B.E.: 2.1 MMOL/L (ref -3–3)
BACTERIA: ABNORMAL /HPF
BASOPHILS ABSOLUTE: 0.03 E9/L (ref 0–0.2)
BASOPHILS RELATIVE PERCENT: 0.3 % (ref 0–2)
BILIRUB SERPL-MCNC: 0.6 MG/DL (ref 0–1.2)
BILIRUBIN URINE: NEGATIVE
BLOOD CULTURE, ROUTINE: NORMAL
BLOOD, URINE: NEGATIVE
BORDETELLA PARAPERTUSSIS BY PCR: NOT DETECTED
BORDETELLA PERTUSSIS BY PCR: NOT DETECTED
BUN BLDV-MCNC: 15 MG/DL (ref 6–23)
BUN BLDV-MCNC: 18 MG/DL (ref 6–23)
BUN BLDV-MCNC: 20 MG/DL (ref 6–23)
BUN BLDV-MCNC: 21 MG/DL (ref 6–23)
BUN BLDV-MCNC: 27 MG/DL (ref 6–23)
CALCIUM SERPL-MCNC: 8.5 MG/DL (ref 8.6–10.2)
CALCIUM SERPL-MCNC: 8.8 MG/DL (ref 8.6–10.2)
CALCIUM SERPL-MCNC: 8.9 MG/DL (ref 8.6–10.2)
CALCIUM SERPL-MCNC: 9.2 MG/DL (ref 8.6–10.2)
CALCIUM SERPL-MCNC: 9.2 MG/DL (ref 8.6–10.2)
CHLAMYDOPHILIA PNEUMONIAE BY PCR: NOT DETECTED
CHLORIDE BLD-SCNC: 101 MMOL/L (ref 98–107)
CHLORIDE BLD-SCNC: 102 MMOL/L (ref 98–107)
CHLORIDE BLD-SCNC: 93 MMOL/L (ref 98–107)
CHLORIDE BLD-SCNC: 95 MMOL/L (ref 98–107)
CHLORIDE BLD-SCNC: 97 MMOL/L (ref 98–107)
CHOLESTEROL, TOTAL: 136 MG/DL (ref 0–199)
CLARITY: CLEAR
CO2: 28 MMOL/L (ref 22–29)
CO2: 28 MMOL/L (ref 22–29)
CO2: 34 MMOL/L (ref 22–29)
CO2: 35 MMOL/L (ref 22–29)
CO2: 35 MMOL/L (ref 22–29)
COHB: 0.9 % (ref 0–1.5)
COLOR: YELLOW
CORONAVIRUS 229E BY PCR: NOT DETECTED
CORONAVIRUS HKU1 BY PCR: NOT DETECTED
CORONAVIRUS NL63 BY PCR: NOT DETECTED
CORONAVIRUS OC43 BY PCR: NOT DETECTED
CREAT SERPL-MCNC: 0.6 MG/DL (ref 0.5–1)
CREAT SERPL-MCNC: 0.7 MG/DL (ref 0.5–1)
CRITICAL: ABNORMAL
CULTURE, BLOOD 2: NORMAL
DATE ANALYZED: ABNORMAL
DATE OF COLLECTION: ABNORMAL
EKG ATRIAL RATE: 86 BPM
EKG ATRIAL RATE: 88 BPM
EKG P AXIS: 53 DEGREES
EKG P AXIS: 53 DEGREES
EKG P-R INTERVAL: 170 MS
EKG P-R INTERVAL: 180 MS
EKG Q-T INTERVAL: 376 MS
EKG Q-T INTERVAL: 384 MS
EKG QRS DURATION: 92 MS
EKG QRS DURATION: 94 MS
EKG QTC CALCULATION (BAZETT): 449 MS
EKG QTC CALCULATION (BAZETT): 464 MS
EKG R AXIS: -5 DEGREES
EKG R AXIS: 24 DEGREES
EKG T AXIS: 17 DEGREES
EKG T AXIS: 50 DEGREES
EKG VENTRICULAR RATE: 86 BPM
EKG VENTRICULAR RATE: 88 BPM
EOSINOPHILS ABSOLUTE: 0.01 E9/L (ref 0.05–0.5)
EOSINOPHILS RELATIVE PERCENT: 0.1 % (ref 0–6)
EPITHELIAL CELLS, UA: ABNORMAL /HPF
GFR AFRICAN AMERICAN: >60
GFR NON-AFRICAN AMERICAN: >60 ML/MIN/1.73
GLUCOSE BLD-MCNC: 102 MG/DL (ref 74–99)
GLUCOSE BLD-MCNC: 115 MG/DL (ref 74–99)
GLUCOSE BLD-MCNC: 133 MG/DL (ref 74–99)
GLUCOSE BLD-MCNC: 94 MG/DL (ref 74–99)
GLUCOSE BLD-MCNC: 97 MG/DL (ref 74–99)
GLUCOSE URINE: NEGATIVE MG/DL
HCO3: 29.1 MMOL/L (ref 22–26)
HCT VFR BLD CALC: 33.6 % (ref 34–48)
HDLC SERPL-MCNC: 38 MG/DL
HEMOGLOBIN: 10.6 G/DL (ref 11.5–15.5)
HHB: 2.7 % (ref 0–5)
HUMAN METAPNEUMOVIRUS BY PCR: NOT DETECTED
HUMAN RHINOVIRUS/ENTEROVIRUS BY PCR: NOT DETECTED
IMMATURE GRANULOCYTES #: 0.06 E9/L
IMMATURE GRANULOCYTES %: 0.6 % (ref 0–5)
INFLUENZA A BY PCR: NOT DETECTED
INFLUENZA B BY PCR: NOT DETECTED
IRON SATURATION: 17 % (ref 15–50)
IRON: 28 MCG/DL (ref 37–145)
KETONES, URINE: ABNORMAL MG/DL
LAB: ABNORMAL
LACTIC ACID: 1.7 MMOL/L (ref 0.5–2.2)
LDL CHOLESTEROL CALCULATED: 79 MG/DL (ref 0–99)
LEUKOCYTE ESTERASE, URINE: NEGATIVE
LV EF: 49 %
LVEF MODALITY: NORMAL
LYMPHOCYTES ABSOLUTE: 0.61 E9/L (ref 1.5–4)
LYMPHOCYTES RELATIVE PERCENT: 6.6 % (ref 20–42)
Lab: ABNORMAL
MAGNESIUM: 1.8 MG/DL (ref 1.6–2.6)
MAGNESIUM: 1.8 MG/DL (ref 1.6–2.6)
MAGNESIUM: 1.9 MG/DL (ref 1.6–2.6)
MAGNESIUM: 1.9 MG/DL (ref 1.6–2.6)
MAGNESIUM: 2.2 MG/DL (ref 1.6–2.6)
MCH RBC QN AUTO: 30.7 PG (ref 26–35)
MCHC RBC AUTO-ENTMCNC: 31.5 % (ref 32–34.5)
MCV RBC AUTO: 97.4 FL (ref 80–99.9)
METHB: 0.2 % (ref 0–1.5)
MODE: ABNORMAL
MONOCYTES ABSOLUTE: 0.84 E9/L (ref 0.1–0.95)
MONOCYTES RELATIVE PERCENT: 9.1 % (ref 2–12)
MYCOPLASMA PNEUMONIAE BY PCR: NOT DETECTED
NEUTROPHILS ABSOLUTE: 7.73 E9/L (ref 1.8–7.3)
NEUTROPHILS RELATIVE PERCENT: 83.3 % (ref 43–80)
NITRITE, URINE: NEGATIVE
O2 CONTENT: 16.1 ML/DL
O2 SATURATION: 97.3 % (ref 92–98.5)
O2HB: 96.2 % (ref 94–97)
OPERATOR ID: 1926
PARAINFLUENZA VIRUS 1 BY PCR: NOT DETECTED
PARAINFLUENZA VIRUS 2 BY PCR: NOT DETECTED
PARAINFLUENZA VIRUS 3 BY PCR: NOT DETECTED
PARAINFLUENZA VIRUS 4 BY PCR: NOT DETECTED
PATIENT TEMP: 37 C
PCO2: 56.4 MMHG (ref 35–45)
PDW BLD-RTO: 14.7 FL (ref 11.5–15)
PH BLOOD GAS: 7.33 (ref 7.35–7.45)
PH UA: 5 (ref 5–9)
PLATELET # BLD: 239 E9/L (ref 130–450)
PMV BLD AUTO: 9.8 FL (ref 7–12)
PO2: 99.9 MMHG (ref 75–100)
POTASSIUM SERPL-SCNC: 3 MMOL/L (ref 3.5–5)
POTASSIUM SERPL-SCNC: 3.4 MMOL/L (ref 3.5–5)
POTASSIUM SERPL-SCNC: 3.6 MMOL/L (ref 3.5–5)
POTASSIUM SERPL-SCNC: 4.1 MMOL/L (ref 3.5–5)
POTASSIUM SERPL-SCNC: 4.6 MMOL/L (ref 3.5–5)
PRO-BNP: 2674 PG/ML (ref 0–450)
PRO-BNP: 7220 PG/ML (ref 0–450)
PRO-BNP: ABNORMAL PG/ML (ref 0–450)
PROTEIN UA: ABNORMAL MG/DL
RBC # BLD: 3.45 E12/L (ref 3.5–5.5)
RBC UA: ABNORMAL /HPF (ref 0–2)
RESPIRATORY SYNCYTIAL VIRUS BY PCR: NOT DETECTED
SARS-COV-2, PCR: NOT DETECTED
SODIUM BLD-SCNC: 139 MMOL/L (ref 132–146)
SODIUM BLD-SCNC: 140 MMOL/L (ref 132–146)
SODIUM BLD-SCNC: 142 MMOL/L (ref 132–146)
SOURCE, BLOOD GAS: ABNORMAL
SPECIFIC GRAVITY UA: 1.02 (ref 1–1.03)
THB: 11.8 G/DL (ref 11.5–16.5)
TIME ANALYZED: 2349
TOTAL IRON BINDING CAPACITY: 163 MCG/DL (ref 250–450)
TOTAL PROTEIN: 6.7 G/DL (ref 6.4–8.3)
TRIGL SERPL-MCNC: 94 MG/DL (ref 0–149)
TROPONIN, HIGH SENSITIVITY: 30 NG/L (ref 0–9)
URINE CULTURE, ROUTINE: NORMAL
UROBILINOGEN, URINE: 2 E.U./DL
VLDLC SERPL CALC-MCNC: 19 MG/DL
WBC # BLD: 9.3 E9/L (ref 4.5–11.5)
WBC UA: ABNORMAL /HPF (ref 0–5)

## 2021-01-01 PROCEDURE — 72070 X-RAY EXAM THORAC SPINE 2VWS: CPT

## 2021-01-01 PROCEDURE — 36415 COLL VENOUS BLD VENIPUNCTURE: CPT

## 2021-01-01 PROCEDURE — G8420 CALC BMI NORM PARAMETERS: HCPCS | Performed by: INTERNAL MEDICINE

## 2021-01-01 PROCEDURE — 72100 X-RAY EXAM L-S SPINE 2/3 VWS: CPT

## 2021-01-01 PROCEDURE — 83880 ASSAY OF NATRIURETIC PEPTIDE: CPT

## 2021-01-01 PROCEDURE — G8427 DOCREV CUR MEDS BY ELIG CLIN: HCPCS | Performed by: CLINICAL NURSE SPECIALIST

## 2021-01-01 PROCEDURE — G8484 FLU IMMUNIZE NO ADMIN: HCPCS | Performed by: CLINICAL NURSE SPECIALIST

## 2021-01-01 PROCEDURE — 2500000003 HC RX 250 WO HCPCS: Performed by: INTERNAL MEDICINE

## 2021-01-01 PROCEDURE — 6370000000 HC RX 637 (ALT 250 FOR IP): Performed by: INTERNAL MEDICINE

## 2021-01-01 PROCEDURE — 97166 OT EVAL MOD COMPLEX 45 MIN: CPT

## 2021-01-01 PROCEDURE — 1123F ACP DISCUSS/DSCN MKR DOCD: CPT | Performed by: INTERNAL MEDICINE

## 2021-01-01 PROCEDURE — 72040 X-RAY EXAM NECK SPINE 2-3 VW: CPT

## 2021-01-01 PROCEDURE — G8427 DOCREV CUR MEDS BY ELIG CLIN: HCPCS | Performed by: INTERNAL MEDICINE

## 2021-01-01 PROCEDURE — 6370000000 HC RX 637 (ALT 250 FOR IP): Performed by: FAMILY MEDICINE

## 2021-01-01 PROCEDURE — 72148 MRI LUMBAR SPINE W/O DYE: CPT

## 2021-01-01 PROCEDURE — 85025 COMPLETE CBC W/AUTO DIFF WBC: CPT

## 2021-01-01 PROCEDURE — 2500000003 HC RX 250 WO HCPCS: Performed by: FAMILY MEDICINE

## 2021-01-01 PROCEDURE — G8420 CALC BMI NORM PARAMETERS: HCPCS | Performed by: PHYSICIAN ASSISTANT

## 2021-01-01 PROCEDURE — 2700000000 HC OXYGEN THERAPY PER DAY

## 2021-01-01 PROCEDURE — 71045 X-RAY EXAM CHEST 1 VIEW: CPT

## 2021-01-01 PROCEDURE — G8427 DOCREV CUR MEDS BY ELIG CLIN: HCPCS | Performed by: PHYSICIAN ASSISTANT

## 2021-01-01 PROCEDURE — 6360000002 HC RX W HCPCS: Performed by: FAMILY MEDICINE

## 2021-01-01 PROCEDURE — 1036F TOBACCO NON-USER: CPT | Performed by: CLINICAL NURSE SPECIALIST

## 2021-01-01 PROCEDURE — 1090F PRES/ABSN URINE INCON ASSESS: CPT | Performed by: INTERNAL MEDICINE

## 2021-01-01 PROCEDURE — 83550 IRON BINDING TEST: CPT

## 2021-01-01 PROCEDURE — 97535 SELF CARE MNGMENT TRAINING: CPT

## 2021-01-01 PROCEDURE — 1090F PRES/ABSN URINE INCON ASSESS: CPT | Performed by: PHYSICIAN ASSISTANT

## 2021-01-01 PROCEDURE — 4040F PNEUMOC VAC/ADMIN/RCVD: CPT | Performed by: INTERNAL MEDICINE

## 2021-01-01 PROCEDURE — 99232 SBSQ HOSP IP/OBS MODERATE 35: CPT | Performed by: NEUROLOGICAL SURGERY

## 2021-01-01 PROCEDURE — 2060000000 HC ICU INTERMEDIATE R&B

## 2021-01-01 PROCEDURE — 80053 COMPREHEN METABOLIC PANEL: CPT

## 2021-01-01 PROCEDURE — 96374 THER/PROPH/DIAG INJ IV PUSH: CPT

## 2021-01-01 PROCEDURE — 99214 OFFICE O/P EST MOD 30 MIN: CPT | Performed by: CLINICAL NURSE SPECIALIST

## 2021-01-01 PROCEDURE — 99213 OFFICE O/P EST LOW 20 MIN: CPT | Performed by: PHYSICIAN ASSISTANT

## 2021-01-01 PROCEDURE — 83605 ASSAY OF LACTIC ACID: CPT

## 2021-01-01 PROCEDURE — 1123F ACP DISCUSS/DSCN MKR DOCD: CPT | Performed by: PHYSICIAN ASSISTANT

## 2021-01-01 PROCEDURE — G8484 FLU IMMUNIZE NO ADMIN: HCPCS | Performed by: PHYSICIAN ASSISTANT

## 2021-01-01 PROCEDURE — 87040 BLOOD CULTURE FOR BACTERIA: CPT

## 2021-01-01 PROCEDURE — 0202U NFCT DS 22 TRGT SARS-COV-2: CPT

## 2021-01-01 PROCEDURE — 93306 TTE W/DOPPLER COMPLETE: CPT

## 2021-01-01 PROCEDURE — G8484 FLU IMMUNIZE NO ADMIN: HCPCS | Performed by: INTERNAL MEDICINE

## 2021-01-01 PROCEDURE — 83735 ASSAY OF MAGNESIUM: CPT

## 2021-01-01 PROCEDURE — 93010 ELECTROCARDIOGRAM REPORT: CPT | Performed by: INTERNAL MEDICINE

## 2021-01-01 PROCEDURE — 80048 BASIC METABOLIC PNL TOTAL CA: CPT

## 2021-01-01 PROCEDURE — 1036F TOBACCO NON-USER: CPT | Performed by: PHYSICIAN ASSISTANT

## 2021-01-01 PROCEDURE — 4040F PNEUMOC VAC/ADMIN/RCVD: CPT | Performed by: CLINICAL NURSE SPECIALIST

## 2021-01-01 PROCEDURE — 99212 OFFICE O/P EST SF 10 MIN: CPT | Performed by: INTERNAL MEDICINE

## 2021-01-01 PROCEDURE — 4040F PNEUMOC VAC/ADMIN/RCVD: CPT | Performed by: PHYSICIAN ASSISTANT

## 2021-01-01 PROCEDURE — 99222 1ST HOSP IP/OBS MODERATE 55: CPT | Performed by: NEUROLOGICAL SURGERY

## 2021-01-01 PROCEDURE — 72146 MRI CHEST SPINE W/O DYE: CPT

## 2021-01-01 PROCEDURE — 1090F PRES/ABSN URINE INCON ASSESS: CPT | Performed by: CLINICAL NURSE SPECIALIST

## 2021-01-01 PROCEDURE — 83540 ASSAY OF IRON: CPT

## 2021-01-01 PROCEDURE — G8420 CALC BMI NORM PARAMETERS: HCPCS | Performed by: CLINICAL NURSE SPECIALIST

## 2021-01-01 PROCEDURE — 6370000000 HC RX 637 (ALT 250 FOR IP): Performed by: EMERGENCY MEDICINE

## 2021-01-01 PROCEDURE — 93005 ELECTROCARDIOGRAM TRACING: CPT | Performed by: EMERGENCY MEDICINE

## 2021-01-01 PROCEDURE — 6360000002 HC RX W HCPCS: Performed by: EMERGENCY MEDICINE

## 2021-01-01 PROCEDURE — 2580000003 HC RX 258: Performed by: FAMILY MEDICINE

## 2021-01-01 PROCEDURE — 99284 EMERGENCY DEPT VISIT MOD MDM: CPT

## 2021-01-01 PROCEDURE — 1123F ACP DISCUSS/DSCN MKR DOCD: CPT | Performed by: CLINICAL NURSE SPECIALIST

## 2021-01-01 PROCEDURE — 97161 PT EVAL LOW COMPLEX 20 MIN: CPT

## 2021-01-01 PROCEDURE — 87088 URINE BACTERIA CULTURE: CPT

## 2021-01-01 PROCEDURE — 1036F TOBACCO NON-USER: CPT | Performed by: INTERNAL MEDICINE

## 2021-01-01 PROCEDURE — 81001 URINALYSIS AUTO W/SCOPE: CPT

## 2021-01-01 PROCEDURE — 84484 ASSAY OF TROPONIN QUANT: CPT

## 2021-01-01 PROCEDURE — 80061 LIPID PANEL: CPT

## 2021-01-01 PROCEDURE — 82805 BLOOD GASES W/O2 SATURATION: CPT

## 2021-01-01 RX ORDER — METOPROLOL SUCCINATE 25 MG/1
25 TABLET, EXTENDED RELEASE ORAL DAILY
COMMUNITY

## 2021-01-01 RX ORDER — HALOPERIDOL 5 MG/ML
2 INJECTION INTRAMUSCULAR EVERY 6 HOURS PRN
Status: DISCONTINUED | OUTPATIENT
Start: 2021-01-01 | End: 2021-01-01 | Stop reason: HOSPADM

## 2021-01-01 RX ORDER — DIVALPROEX SODIUM 125 MG/1
125 TABLET, DELAYED RELEASE ORAL 2 TIMES DAILY
Qty: 60 TABLET | Refills: 2 | Status: SHIPPED
Start: 2021-01-01 | End: 2021-01-01

## 2021-01-01 RX ORDER — ACETAMINOPHEN 325 MG/1
650 TABLET ORAL EVERY 6 HOURS PRN
Status: DISCONTINUED | OUTPATIENT
Start: 2021-01-01 | End: 2021-01-01 | Stop reason: HOSPADM

## 2021-01-01 RX ORDER — BUMETANIDE 0.25 MG/ML
1 INJECTION, SOLUTION INTRAMUSCULAR; INTRAVENOUS 2 TIMES DAILY
Status: DISCONTINUED | OUTPATIENT
Start: 2021-01-01 | End: 2021-01-01

## 2021-01-01 RX ORDER — DOCUSATE SODIUM 100 MG/1
100 CAPSULE, LIQUID FILLED ORAL DAILY
Status: DISCONTINUED | OUTPATIENT
Start: 2021-01-01 | End: 2021-01-01 | Stop reason: HOSPADM

## 2021-01-01 RX ORDER — LOSARTAN POTASSIUM 25 MG/1
100 TABLET ORAL DAILY
Status: DISCONTINUED | OUTPATIENT
Start: 2021-01-01 | End: 2021-01-01 | Stop reason: HOSPADM

## 2021-01-01 RX ORDER — LORAZEPAM 0.5 MG/1
0.5 TABLET ORAL
Qty: 2 TABLET | Refills: 0 | Status: SHIPPED | OUTPATIENT
Start: 2021-01-01 | End: 2021-01-01

## 2021-01-01 RX ORDER — PANTOPRAZOLE SODIUM 40 MG/1
40 TABLET, DELAYED RELEASE ORAL DAILY
Status: DISCONTINUED | OUTPATIENT
Start: 2021-01-01 | End: 2021-01-01 | Stop reason: HOSPADM

## 2021-01-01 RX ORDER — GABAPENTIN 100 MG/1
100 CAPSULE ORAL EVERY MORNING
Status: DISCONTINUED | OUTPATIENT
Start: 2021-01-01 | End: 2021-01-01 | Stop reason: HOSPADM

## 2021-01-01 RX ORDER — ASPIRIN 81 MG/1
81 TABLET ORAL DAILY
COMMUNITY

## 2021-01-01 RX ORDER — NITROGLYCERIN 0.4 MG/1
0.4 TABLET SUBLINGUAL ONCE
Status: COMPLETED | OUTPATIENT
Start: 2021-01-01 | End: 2021-01-01

## 2021-01-01 RX ORDER — GALANTAMINE HYDROBROMIDE 4 MG/1
4 TABLET, FILM COATED ORAL
Status: DISCONTINUED | OUTPATIENT
Start: 2021-01-01 | End: 2021-01-01 | Stop reason: HOSPADM

## 2021-01-01 RX ORDER — SODIUM CHLORIDE 9 MG/ML
25 INJECTION, SOLUTION INTRAVENOUS PRN
Status: DISCONTINUED | OUTPATIENT
Start: 2021-01-01 | End: 2021-01-01 | Stop reason: HOSPADM

## 2021-01-01 RX ORDER — GABAPENTIN 100 MG/1
200 CAPSULE ORAL EVERY EVENING
Status: DISCONTINUED | OUTPATIENT
Start: 2021-01-01 | End: 2021-01-01 | Stop reason: HOSPADM

## 2021-01-01 RX ORDER — METOPROLOL TARTRATE 5 MG/5ML
5 INJECTION INTRAVENOUS EVERY 6 HOURS PRN
Status: DISCONTINUED | OUTPATIENT
Start: 2021-01-01 | End: 2021-01-01 | Stop reason: HOSPADM

## 2021-01-01 RX ORDER — SODIUM CHLORIDE 0.9 % (FLUSH) 0.9 %
10 SYRINGE (ML) INJECTION PRN
Status: DISCONTINUED | OUTPATIENT
Start: 2021-01-01 | End: 2021-01-01 | Stop reason: HOSPADM

## 2021-01-01 RX ORDER — LIDOCAINE 4 G/G
1 PATCH TOPICAL DAILY
Status: DISCONTINUED | OUTPATIENT
Start: 2021-01-01 | End: 2021-01-01 | Stop reason: HOSPADM

## 2021-01-01 RX ORDER — SODIUM CHLORIDE 0.9 % (FLUSH) 0.9 %
5-40 SYRINGE (ML) INJECTION EVERY 12 HOURS SCHEDULED
Status: DISCONTINUED | OUTPATIENT
Start: 2021-01-01 | End: 2021-01-01 | Stop reason: HOSPADM

## 2021-01-01 RX ORDER — TRAMADOL HYDROCHLORIDE 50 MG/1
50 TABLET ORAL EVERY 4 HOURS PRN
Qty: 42 TABLET | Refills: 0 | Status: SHIPPED | OUTPATIENT
Start: 2021-01-01 | End: 2021-01-01

## 2021-01-01 RX ORDER — NADOLOL 20 MG/1
20 TABLET ORAL ONCE
Status: COMPLETED | OUTPATIENT
Start: 2021-01-01 | End: 2021-01-01

## 2021-01-01 RX ORDER — FLUOXETINE 10 MG/1
10 TABLET, FILM COATED ORAL DAILY
COMMUNITY

## 2021-01-01 RX ORDER — ASPIRIN 81 MG/1
81 TABLET ORAL DAILY
Status: DISCONTINUED | OUTPATIENT
Start: 2021-01-01 | End: 2021-01-01 | Stop reason: HOSPADM

## 2021-01-01 RX ORDER — FUROSEMIDE 10 MG/ML
40 INJECTION INTRAMUSCULAR; INTRAVENOUS ONCE
Status: COMPLETED | OUTPATIENT
Start: 2021-01-01 | End: 2021-01-01

## 2021-01-01 RX ORDER — GABAPENTIN 100 MG/1
CAPSULE ORAL
COMMUNITY

## 2021-01-01 RX ORDER — NITROGLYCERIN 40 MG/1
1 PATCH TRANSDERMAL EVERY EVENING
Status: DISCONTINUED | OUTPATIENT
Start: 2021-01-01 | End: 2021-01-01 | Stop reason: HOSPADM

## 2021-01-01 RX ORDER — TRAMADOL HYDROCHLORIDE 50 MG/1
50 TABLET ORAL EVERY 4 HOURS PRN
Qty: 42 TABLET | Refills: 0 | Status: SHIPPED | OUTPATIENT
Start: 2021-01-01 | End: 2021-01-01 | Stop reason: SDUPTHER

## 2021-01-01 RX ORDER — HALOPERIDOL 0.5 MG/1
0.5 TABLET ORAL NIGHTLY
COMMUNITY

## 2021-01-01 RX ORDER — BUMETANIDE 1 MG/1
1 TABLET ORAL 2 TIMES DAILY
Status: DISCONTINUED | OUTPATIENT
Start: 2021-01-01 | End: 2021-01-01 | Stop reason: HOSPADM

## 2021-01-01 RX ORDER — RIVASTIGMINE 4.6 MG/24H
1 PATCH, EXTENDED RELEASE TRANSDERMAL DAILY
Qty: 30 PATCH | Refills: 2 | Status: SHIPPED
Start: 2021-01-01 | End: 2021-01-01

## 2021-01-01 RX ORDER — GALANTAMINE HYDROBROMIDE 4 MG/1
4 TABLET, FILM COATED ORAL
Qty: 7 TABLET | Refills: 3 | Status: SHIPPED | OUTPATIENT
Start: 2021-01-01

## 2021-01-01 RX ORDER — TRAMADOL HYDROCHLORIDE 50 MG/1
50 TABLET ORAL EVERY 4 HOURS PRN
Qty: 42 TABLET | Refills: 0 | Status: SHIPPED
Start: 2021-01-01 | End: 2021-01-01 | Stop reason: SDUPTHER

## 2021-01-01 RX ORDER — POTASSIUM CHLORIDE 20 MEQ/1
40 TABLET, EXTENDED RELEASE ORAL 2 TIMES DAILY WITH MEALS
Status: COMPLETED | OUTPATIENT
Start: 2021-01-01 | End: 2021-01-01

## 2021-01-01 RX ORDER — ONDANSETRON 2 MG/ML
4 INJECTION INTRAMUSCULAR; INTRAVENOUS EVERY 6 HOURS PRN
Status: DISCONTINUED | OUTPATIENT
Start: 2021-01-01 | End: 2021-01-01 | Stop reason: HOSPADM

## 2021-01-01 RX ORDER — LIDOCAINE 4 G/G
1 PATCH TOPICAL
COMMUNITY

## 2021-01-01 RX ORDER — RIVASTIGMINE 4.6 MG/24H
1 PATCH, EXTENDED RELEASE TRANSDERMAL DAILY
Qty: 7 PATCH | Refills: 0 | Status: SHIPPED
Start: 2021-01-01 | End: 2021-01-01

## 2021-01-01 RX ORDER — ONDANSETRON 4 MG/1
4 TABLET, ORALLY DISINTEGRATING ORAL EVERY 8 HOURS PRN
Status: DISCONTINUED | OUTPATIENT
Start: 2021-01-01 | End: 2021-01-01 | Stop reason: HOSPADM

## 2021-01-01 RX ORDER — ACETAMINOPHEN 650 MG/1
650 SUPPOSITORY RECTAL EVERY 6 HOURS PRN
Status: DISCONTINUED | OUTPATIENT
Start: 2021-01-01 | End: 2021-01-01 | Stop reason: HOSPADM

## 2021-01-01 RX ORDER — TRAMADOL HYDROCHLORIDE 50 MG/1
50 TABLET ORAL EVERY 6 HOURS PRN
Status: DISCONTINUED | OUTPATIENT
Start: 2021-01-01 | End: 2021-01-01 | Stop reason: HOSPADM

## 2021-01-01 RX ORDER — BUMETANIDE 1 MG/1
1 TABLET ORAL DAILY
Qty: 30 TABLET | Refills: 3 | Status: SHIPPED | OUTPATIENT
Start: 2021-01-01

## 2021-01-01 RX ORDER — TRAZODONE HYDROCHLORIDE 50 MG/1
75 TABLET ORAL NIGHTLY
Status: DISCONTINUED | OUTPATIENT
Start: 2021-01-01 | End: 2021-01-01 | Stop reason: HOSPADM

## 2021-01-01 RX ORDER — BUMETANIDE 0.25 MG/ML
0.5 INJECTION, SOLUTION INTRAMUSCULAR; INTRAVENOUS DAILY
Status: DISCONTINUED | OUTPATIENT
Start: 2021-01-01 | End: 2021-01-01

## 2021-01-01 RX ADMIN — Medication 10 ML: at 08:11

## 2021-01-01 RX ADMIN — BUMETANIDE 1 MG: 0.25 INJECTION INTRAMUSCULAR; INTRAVENOUS at 08:11

## 2021-01-01 RX ADMIN — DOCUSATE SODIUM 100 MG: 100 CAPSULE, LIQUID FILLED ORAL at 10:31

## 2021-01-01 RX ADMIN — BUMETANIDE 1 MG: 0.25 INJECTION INTRAMUSCULAR; INTRAVENOUS at 20:03

## 2021-01-01 RX ADMIN — METOPROLOL TARTRATE 5 MG: 1 INJECTION, SOLUTION INTRAVENOUS at 11:40

## 2021-01-01 RX ADMIN — LOSARTAN POTASSIUM 100 MG: 25 TABLET, FILM COATED ORAL at 08:11

## 2021-01-01 RX ADMIN — BUMETANIDE 1 MG: 0.25 INJECTION INTRAMUSCULAR; INTRAVENOUS at 21:47

## 2021-01-01 RX ADMIN — Medication 10 ML: at 21:46

## 2021-01-01 RX ADMIN — Medication 10 ML: at 21:11

## 2021-01-01 RX ADMIN — GABAPENTIN 100 MG: 100 CAPSULE ORAL at 08:10

## 2021-01-01 RX ADMIN — LOSARTAN POTASSIUM 100 MG: 25 TABLET, FILM COATED ORAL at 10:30

## 2021-01-01 RX ADMIN — LOSARTAN POTASSIUM 100 MG: 25 TABLET, FILM COATED ORAL at 09:06

## 2021-01-01 RX ADMIN — ASPIRIN 81 MG: 81 TABLET, COATED ORAL at 08:10

## 2021-01-01 RX ADMIN — DOCUSATE SODIUM 100 MG: 100 CAPSULE, LIQUID FILLED ORAL at 08:10

## 2021-01-01 RX ADMIN — TRAZODONE HYDROCHLORIDE 75 MG: 50 TABLET ORAL at 20:58

## 2021-01-01 RX ADMIN — TRAMADOL HYDROCHLORIDE 50 MG: 50 TABLET, FILM COATED ORAL at 04:33

## 2021-01-01 RX ADMIN — GABAPENTIN 200 MG: 100 CAPSULE ORAL at 18:50

## 2021-01-01 RX ADMIN — ENOXAPARIN SODIUM 40 MG: 40 INJECTION SUBCUTANEOUS at 09:48

## 2021-01-01 RX ADMIN — NITROGLYCERIN 0.4 MG: 0.4 TABLET, ORALLY DISINTEGRATING SUBLINGUAL at 06:31

## 2021-01-01 RX ADMIN — ASPIRIN 81 MG: 81 TABLET, COATED ORAL at 09:07

## 2021-01-01 RX ADMIN — TRAMADOL HYDROCHLORIDE 50 MG: 50 TABLET, FILM COATED ORAL at 08:11

## 2021-01-01 RX ADMIN — ENOXAPARIN SODIUM 40 MG: 40 INJECTION SUBCUTANEOUS at 09:07

## 2021-01-01 RX ADMIN — ACETAMINOPHEN 650 MG: 325 TABLET ORAL at 11:19

## 2021-01-01 RX ADMIN — ASPIRIN 81 MG: 81 TABLET, COATED ORAL at 09:37

## 2021-01-01 RX ADMIN — PANTOPRAZOLE SODIUM 40 MG: 40 TABLET, DELAYED RELEASE ORAL at 08:10

## 2021-01-01 RX ADMIN — Medication 10 ML: at 09:54

## 2021-01-01 RX ADMIN — POTASSIUM CHLORIDE 40 MEQ: 1500 TABLET, EXTENDED RELEASE ORAL at 10:42

## 2021-01-01 RX ADMIN — NADOLOL 20 MG: 20 TABLET ORAL at 13:55

## 2021-01-01 RX ADMIN — ENOXAPARIN SODIUM 40 MG: 40 INJECTION SUBCUTANEOUS at 08:09

## 2021-01-01 RX ADMIN — GABAPENTIN 100 MG: 100 CAPSULE ORAL at 10:31

## 2021-01-01 RX ADMIN — GALANTAMINE 4 MG: 4 TABLET, FILM COATED ORAL at 09:47

## 2021-01-01 RX ADMIN — Medication 400 MG: at 16:57

## 2021-01-01 RX ADMIN — ACETAMINOPHEN 650 MG: 325 TABLET ORAL at 09:37

## 2021-01-01 RX ADMIN — Medication 10 ML: at 21:49

## 2021-01-01 RX ADMIN — GALANTAMINE 4 MG: 4 TABLET, FILM COATED ORAL at 09:06

## 2021-01-01 RX ADMIN — DOCUSATE SODIUM 100 MG: 100 CAPSULE, LIQUID FILLED ORAL at 09:48

## 2021-01-01 RX ADMIN — TRAZODONE HYDROCHLORIDE 75 MG: 50 TABLET ORAL at 21:17

## 2021-01-01 RX ADMIN — Medication 10 ML: at 10:31

## 2021-01-01 RX ADMIN — TRAZODONE HYDROCHLORIDE 75 MG: 50 TABLET ORAL at 21:49

## 2021-01-01 RX ADMIN — PANTOPRAZOLE SODIUM 40 MG: 40 TABLET, DELAYED RELEASE ORAL at 08:11

## 2021-01-01 RX ADMIN — GABAPENTIN 200 MG: 100 CAPSULE ORAL at 17:10

## 2021-01-01 RX ADMIN — TRAZODONE HYDROCHLORIDE 75 MG: 50 TABLET ORAL at 22:56

## 2021-01-01 RX ADMIN — TRAMADOL HYDROCHLORIDE 50 MG: 50 TABLET, FILM COATED ORAL at 22:57

## 2021-01-01 RX ADMIN — METOPROLOL TARTRATE 5 MG: 1 INJECTION, SOLUTION INTRAVENOUS at 20:03

## 2021-01-01 RX ADMIN — PANTOPRAZOLE SODIUM 40 MG: 40 TABLET, DELAYED RELEASE ORAL at 10:31

## 2021-01-01 RX ADMIN — ENOXAPARIN SODIUM 40 MG: 40 INJECTION SUBCUTANEOUS at 08:11

## 2021-01-01 RX ADMIN — BUMETANIDE 0.5 MG: 0.25 INJECTION INTRAMUSCULAR; INTRAVENOUS at 10:31

## 2021-01-01 RX ADMIN — PANTOPRAZOLE SODIUM 40 MG: 40 TABLET, DELAYED RELEASE ORAL at 09:53

## 2021-01-01 RX ADMIN — GABAPENTIN 100 MG: 100 CAPSULE ORAL at 09:48

## 2021-01-01 RX ADMIN — GALANTAMINE 4 MG: 4 TABLET, FILM COATED ORAL at 10:31

## 2021-01-01 RX ADMIN — BUMETANIDE 1 MG: 1 TABLET ORAL at 09:39

## 2021-01-01 RX ADMIN — Medication 10 ML: at 20:57

## 2021-01-01 RX ADMIN — ACETAMINOPHEN 650 MG: 325 TABLET ORAL at 16:01

## 2021-01-01 RX ADMIN — BUMETANIDE 1 MG: 0.25 INJECTION INTRAMUSCULAR; INTRAVENOUS at 09:07

## 2021-01-01 RX ADMIN — PANTOPRAZOLE SODIUM 40 MG: 40 TABLET, DELAYED RELEASE ORAL at 09:06

## 2021-01-01 RX ADMIN — METOPROLOL TARTRATE 5 MG: 1 INJECTION, SOLUTION INTRAVENOUS at 22:53

## 2021-01-01 RX ADMIN — TRAMADOL HYDROCHLORIDE 50 MG: 50 TABLET, FILM COATED ORAL at 18:50

## 2021-01-01 RX ADMIN — GABAPENTIN 100 MG: 100 CAPSULE ORAL at 09:37

## 2021-01-01 RX ADMIN — BUMETANIDE 1 MG: 0.25 INJECTION INTRAMUSCULAR; INTRAVENOUS at 08:09

## 2021-01-01 RX ADMIN — GALANTAMINE 4 MG: 4 TABLET, FILM COATED ORAL at 09:37

## 2021-01-01 RX ADMIN — BUMETANIDE 1 MG: 1 TABLET ORAL at 22:56

## 2021-01-01 RX ADMIN — Medication 10 ML: at 09:07

## 2021-01-01 RX ADMIN — METOPROLOL TARTRATE 5 MG: 1 INJECTION, SOLUTION INTRAVENOUS at 03:37

## 2021-01-01 RX ADMIN — POTASSIUM CHLORIDE 40 MEQ: 1500 TABLET, EXTENDED RELEASE ORAL at 17:04

## 2021-01-01 RX ADMIN — FUROSEMIDE 40 MG: 10 INJECTION INTRAMUSCULAR; INTRAVENOUS at 00:29

## 2021-01-01 RX ADMIN — Medication 400 MG: at 09:48

## 2021-01-01 RX ADMIN — Medication 10 ML: at 08:14

## 2021-01-01 RX ADMIN — GABAPENTIN 200 MG: 100 CAPSULE ORAL at 17:36

## 2021-01-01 RX ADMIN — LOSARTAN POTASSIUM 100 MG: 25 TABLET, FILM COATED ORAL at 09:47

## 2021-01-01 RX ADMIN — TRAZODONE HYDROCHLORIDE 75 MG: 50 TABLET ORAL at 20:03

## 2021-01-01 RX ADMIN — BUMETANIDE 1 MG: 1 TABLET ORAL at 09:48

## 2021-01-01 RX ADMIN — Medication 10 ML: at 22:56

## 2021-01-01 RX ADMIN — LOSARTAN POTASSIUM 100 MG: 25 TABLET, FILM COATED ORAL at 08:10

## 2021-01-01 RX ADMIN — METOPROLOL TARTRATE 5 MG: 1 INJECTION, SOLUTION INTRAVENOUS at 21:08

## 2021-01-01 RX ADMIN — DOCUSATE SODIUM 100 MG: 100 CAPSULE, LIQUID FILLED ORAL at 09:06

## 2021-01-01 RX ADMIN — ASPIRIN 81 MG: 81 TABLET, COATED ORAL at 10:31

## 2021-01-01 RX ADMIN — GALANTAMINE 4 MG: 4 TABLET, FILM COATED ORAL at 08:11

## 2021-01-01 RX ADMIN — Medication 400 MG: at 09:40

## 2021-01-01 RX ADMIN — NITROGLYCERIN 0.5 INCH: 20 OINTMENT TOPICAL at 23:03

## 2021-01-01 RX ADMIN — TRAMADOL HYDROCHLORIDE 50 MG: 50 TABLET, FILM COATED ORAL at 14:43

## 2021-01-01 RX ADMIN — GALANTAMINE 4 MG: 4 TABLET, FILM COATED ORAL at 08:10

## 2021-01-01 RX ADMIN — ASPIRIN 81 MG: 81 TABLET, COATED ORAL at 09:48

## 2021-01-01 RX ADMIN — METOPROLOL TARTRATE 5 MG: 1 INJECTION, SOLUTION INTRAVENOUS at 00:08

## 2021-01-01 RX ADMIN — LOSARTAN POTASSIUM 100 MG: 25 TABLET, FILM COATED ORAL at 09:39

## 2021-01-01 RX ADMIN — ENOXAPARIN SODIUM 40 MG: 40 INJECTION SUBCUTANEOUS at 10:31

## 2021-01-01 RX ADMIN — GABAPENTIN 100 MG: 100 CAPSULE ORAL at 09:07

## 2021-01-01 RX ADMIN — BUMETANIDE 1 MG: 1 TABLET ORAL at 21:17

## 2021-01-01 RX ADMIN — SODIUM CHLORIDE, PRESERVATIVE FREE 10 ML: 5 INJECTION INTRAVENOUS at 03:36

## 2021-01-01 RX ADMIN — METOPROLOL TARTRATE 5 MG: 1 INJECTION, SOLUTION INTRAVENOUS at 08:11

## 2021-01-01 ASSESSMENT — PAIN SCALES - GENERAL
PAINLEVEL_OUTOF10: 6
PAINLEVEL_OUTOF10: 0
PAINLEVEL_OUTOF10: 3
PAINLEVEL_OUTOF10: 0
PAINLEVEL_OUTOF10: 5
PAINLEVEL_OUTOF10: 0
PAINLEVEL_OUTOF10: 6
PAINLEVEL_OUTOF10: 10
PAINLEVEL_OUTOF10: 3
PAINLEVEL_OUTOF10: 0
PAINLEVEL_OUTOF10: 5
PAINLEVEL_OUTOF10: 3
PAINLEVEL_OUTOF10: 0
PAINLEVEL_OUTOF10: 5
PAINLEVEL_OUTOF10: 5
PAINLEVEL_OUTOF10: 10
PAINLEVEL_OUTOF10: 0
PAINLEVEL_OUTOF10: 4
PAINLEVEL_OUTOF10: 0
PAINLEVEL_OUTOF10: 0

## 2021-01-01 ASSESSMENT — PAIN DESCRIPTION - PROGRESSION
CLINICAL_PROGRESSION: NOT CHANGED

## 2021-01-01 ASSESSMENT — PAIN DESCRIPTION - FREQUENCY
FREQUENCY: INTERMITTENT
FREQUENCY: INTERMITTENT
FREQUENCY: CONTINUOUS

## 2021-01-01 ASSESSMENT — PAIN DESCRIPTION - ORIENTATION
ORIENTATION: LOWER
ORIENTATION: LOWER
ORIENTATION: LOWER;MID
ORIENTATION: POSTERIOR

## 2021-01-01 ASSESSMENT — PAIN DESCRIPTION - DESCRIPTORS
DESCRIPTORS: ACHING;DISCOMFORT;DULL
DESCRIPTORS: ACHING
DESCRIPTORS: CONSTANT;ACHING
DESCRIPTORS: ACHING;DULL

## 2021-01-01 ASSESSMENT — PAIN DESCRIPTION - PAIN TYPE
TYPE: ACUTE PAIN
TYPE: ACUTE PAIN;CHRONIC PAIN
TYPE: CHRONIC PAIN
TYPE: CHRONIC PAIN

## 2021-01-01 ASSESSMENT — PAIN DESCRIPTION - ONSET
ONSET: GRADUAL
ONSET: GRADUAL
ONSET: ON-GOING

## 2021-01-01 ASSESSMENT — PAIN DESCRIPTION - LOCATION
LOCATION: BACK
LOCATION: NECK
LOCATION: BACK
LOCATION: BACK

## 2021-01-01 ASSESSMENT — PATIENT HEALTH QUESTIONNAIRE - PHQ9: SUM OF ALL RESPONSES TO PHQ QUESTIONS 1-9: 1

## 2021-01-01 ASSESSMENT — PAIN - FUNCTIONAL ASSESSMENT: PAIN_FUNCTIONAL_ASSESSMENT: PREVENTS OR INTERFERES SOME ACTIVE ACTIVITIES AND ADLS

## 2021-01-07 NOTE — PROGRESS NOTES
Patient is here for follow up from a hospital consult for: thoracic and cervical compression fractures. She has no mid back pain or neck pain. She continues to have chronic severe low back and left leg pain despite MISTI, PT, gabapentin and ultram    Physical exam  Alert and Oriented X3  PERRLA, EOMI  HERNANDEZ 5/5  Sensation intact to LT and PP  Reflexes are 2+ and symmetric    A/P: patient is here for follow up for: severe low back and left leg pain. She has exhausted all conservative measures. Lumbar MRI will be ordered.

## 2021-01-08 NOTE — TELEPHONE ENCOUNTER
Pt's son Shakir Mcmullen called states that mother was seen here yesterday by Tammy Ruggiero and that he was going to order pain meds for her but they did not have any at pharmacy. Her [facility] is [facility] in [place].   Called pt with mri appt at [facility] on 01/18/21 arrival time 10:00am. Informed pt to not ear metals to bring id, insurance and med list.

## 2021-01-08 NOTE — TELEPHONE ENCOUNTER
Spoke with patient's daughter, Wes Rubio, regarding scheduling carotid ultrasound. She states patient recently discharged from rehab and  has back pain which is being worked up. She will call back to schedule the carotid ultrasound.

## 2021-01-13 NOTE — TELEPHONE ENCOUNTER
Pt's daughter called hedy asking for a xanax rx for her mother to go into mri. Pharmacy [facility] [place].  Mri ON 01/18/21
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
## 2021-01-25 NOTE — TELEPHONE ENCOUNTER
Son called in patient started Depakote on 1/22, that night had the shakes so bad that patient could not even walk/stand, she also had the Vaccine that morning so they were unsure if the shakes came from the Vaccine. Patient given another dose of Depakote on Saturday same reaction, so they stopped the medication.    Please Advise  Electronically signed by Krystal Cintron on 1/25/21 at 12:57 PM EST

## 2021-02-01 NOTE — TELEPHONE ENCOUNTER
Patient requesting MRI results and a refill on Tramadol to [facility] on [address].   Pt#  [phone]

## 2021-02-01 NOTE — TELEPHONE ENCOUNTER
Called Darrin back, no answer. Left a detailed message. New fractures at T11 and T12. Custom fit (soft) TLSO brace ordered and faxed to [facility].

## 2021-02-02 NOTE — TELEPHONE ENCOUNTER
Brook Verdin her son called states that Ralph Sanchez has a thoracic fracture and wants to know why she just receiving a back brace now when it has been 2 months since her fall injury. Pt is still in pain.  Please call Brook Verdin at [phone]

## 2021-02-02 NOTE — TELEPHONE ENCOUNTER
Kyra. New fractures noted at T11 and T12, these were not seen at CT scan 11/19. She is now having low back pain which she was not previously. Custom fit TLSO brace ordered and faxed to [facility].

## 2021-02-03 NOTE — TELEPHONE ENCOUNTER
Patient's son called in today per Cedrick's request. Rian Bell mention that there is a patch for memory loss, he could call in. Please advise.   Electronically signed by Chintan Renae MA on 2/3/21 at 10:49 AM EST

## 2021-02-17 NOTE — TELEPHONE ENCOUNTER
[employer] Approval of Exelon Patch 4.6 mg Valid thru 12/31/2021.   In Media    ID# [id number]  Bin# [id number]  PCN # Part [id number]  [id number]  Electronically signed by Ellen Evans on 2/17/21 at 2:44 PM EST

## 2021-02-17 NOTE — TELEPHONE ENCOUNTER
Son called and is requesting a 7 day supply of Exelon Patch (Approval in Media) states since his mom is so sensitive to medication he justs wants to try a few days before paying the co pay.   Radames Panchal and Jocelin signed by Will Agrawal on 2/17/21 at 2:25 PM EST

## 2021-03-02 NOTE — TELEPHONE ENCOUNTER
Called and left voice message for Stefan Mars to stop Ilda's patch and to call back with any questions.

## 2021-03-03 NOTE — PROGRESS NOTES
Office Follow-up     This is an 80year old female who presents to the office for a one month follow-up s/p acute T11 and T12 compression fractures      Subjective: Patient presents to the office with her son. States she continues to have low back pain. She is not tolerating soft TLSO brace and started to wear an LSO brace. Did not have x-rays prior to appointment.      Physical Exam:              WDWN, no apparent distress   Presents in a wheelchair              Non-labored breathing               Vitals Stable              Alert and oriented x3              CN 3-12 intact              PERRL              EOMI              Motor strength symmetric 4/5              Sensation to LT intact bilaterally                  Imaging: Sent for x-rays     Assessment: This is a 80 y.o.  female presenting for a one month follow-up s/p T11 and T12 compression fractures     Plan:  -Sent for x-rays. Will call with results.   -Discussed importance of wearing soft TLSO brace. Pt understands and will try to wear it.   -Refill of Tramadol sent to pharmacy  -Follow-up in neurosurgery clinic in 2 months with repeat thoracic and lumbar x-rays  -Call or return to neurosurgery office sooner if symptoms worsen or if new issues arise in the interim.     Electronically signed by Nayan Andres PA-C on 3/3/2021 at 2:22 PM
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
## 2021-03-23 NOTE — PROGRESS NOTES
Cc DEMENTIA EVALUATION    Living on [place] in [facility]  Here with son Lázaro Burroughs  And kids provide 24/7 Care  Poor sleep at times to urinate   3 to 4 times   ADL's independent , showers with help of daughter  IADL's per family  Walker dependent   Doc Gins in November  in back Fx, was in kitchen and fell  Now with back brace  Met  at [address] he was [place] and  Dwana Jewel up [employer] in [place] and graduated 1951 in [place]  And brother head of [facility] town to get  in [place]   Son here and noticed changes for 4 years a  And no Surgery or head injuries  Taking 650 mg Tyllenol   6 a day  And told him to give only 3 a day   MMSE 19 Clock 1/4  May hallucinate , seeing cat and squirrel in house  Dangers reviewed/ Dangers reviewed   BB OK  Impression; SDAT moderate                       Adverse effects with Aricept , Exelon, Namenda   Plan: Trial with short acting Razadyne 4 mg a day

## 2021-03-23 NOTE — PATIENT INSTRUCTIONS
Patient Education        Preventing Falls: Care Instructions  Your Care Instructions     Getting around your home safely can be a challenge if you have injuries or health problems that make it easy for you to fall. Loose rugs and furniture in walkways are among the dangers for many older people who have problems walking or who have poor eyesight. People who have conditions such as arthritis, osteoporosis, or dementia also have to be careful not to fall. You can make your home safer with a few simple measures. Follow-up care is a key part of your treatment and safety. Be sure to make and go to all appointments, and call your doctor if you are having problems. It's also a good idea to know your test results and keep a list of the medicines you take. How can you care for yourself at home? Taking care of yourself  · You may get dizzy if you do not drink enough water. To prevent dehydration, drink plenty of fluids. Choose water and other caffeine-free clear liquids. If you have kidney, heart, or liver disease and have to limit fluids, talk with your doctor before you increase the amount of fluids you drink. · Exercise regularly to improve your strength, muscle tone, and balance. Walk if you can. Swimming may be a good choice if you cannot walk easily. · Have your vision and hearing checked each year or any time you notice a change. If you have trouble seeing and hearing, you might not be able to avoid objects and could lose your balance. · Know the side effects of the medicines you take. Ask your doctor or pharmacist whether the medicines you take can affect your balance. Sleeping pills or sedatives can affect your balance. · Limit the amount of alcohol you drink. Alcohol can impair your balance and other senses. · Ask your doctor whether calluses or corns on your feet need to be removed. If you wear loose-fitting shoes because of calluses or corns, you can lose your balance and fall.   · Talk to your doctor if you have numbness in your feet. Preventing falls at home  · Remove raised doorway thresholds, throw rugs, and clutter. Repair loose carpet or raised areas in the floor. · Move furniture and electrical cords to keep them out of walking paths. · Use nonskid floor wax, and wipe up spills right away, especially on ceramic tile floors. · If you use a walker or cane, put rubber tips on it. If you use crutches, clean the bottoms of them regularly with an abrasive pad, such as steel wool. · Keep your house well lit, especially Tennie Barb, and outside walkways. Use night-lights in areas such as hallways and bathrooms. Add extra light switches or use remote switches (such as switches that go on or off when you clap your hands) to make it easier to turn lights on if you have to get up during the night. · Install sturdy handrails on stairways. · Move items in your cabinets so that the things you use a lot are on the lower shelves (about waist level). · Keep a cordless phone and a flashlight with new batteries by your bed. If possible, put a phone in each of the main rooms of your house, or carry a cell phone in case you fall and cannot reach a phone. Or, you can wear a device around your neck or wrist. You push a button that sends a signal for help. · Wear low-heeled shoes that fit well and give your feet good support. Use footwear with nonskid soles. Check the heels and soles of your shoes for wear. Repair or replace worn heels or soles. · Do not wear socks without shoes on wood floors. · Walk on the grass when the sidewalks are slippery. If you live in an area that gets snow and ice in the winter, sprinkle salt on slippery steps and sidewalks. Preventing falls in the bath  · Install grab bars and nonskid mats inside and outside your shower or tub and near the toilet and sinks. · Use shower chairs and bath benches. · Use a hand-held shower head that will allow you to sit while showering.   · Get into a tub or shower by putting the weaker leg in first. Get out of a tub or shower with your strong side first.  · Repair loose toilet seats and consider installing a raised toilet seat to make getting on and off the toilet easier. · Keep your bathroom door unlocked while you are in the shower. Where can you learn more? Go to https://GCWpepiceweb.Scanbuy. org and sign in to your [employer] account. Enter [phone] in the [facility] box to learn more about \"Preventing Falls: Care Instructions. \"     If you do not have an account, please click on the \"Sign Up Now\" link. Current as of: December 7, 2020               Content Version: 12.8  © 7606-9992 Healthwise, Incorporated. Care instructions adapted under license by [facility] If you have questions about a medical condition or this instruction, always ask your healthcare professional. [place] [address] any warranty or liability for your use of this information.

## 2021-05-05 NOTE — PROGRESS NOTES
Office Follow-up     This is an 80year old female who presents to the office for a three month follow-up s/p acute T11 and T12 compression fractures      Subjective: Patient presents to the office with her son. States her back pain has significantly improved. Admits she does still have low back pain and leg pain. She is following up with pain management for MISTI and Tramadol which helps. X-rays reviewed.       Physical Exam:              WDWN, no apparent distress   Presents in a wheelchair              Non-labored breathing               Vitals Stable              Alert and oriented x3              CN 3-12 intact              PERRL              EOMI              Motor strength symmetric 4/5              Sensation to LT intact bilaterally                  Imaging: X-rays:   Impression   Stable vertebra plana compression injury at T11.  Degenerative spondylotic   changes which are stable at the T-spine and L-spine.          Assessment: This is a 80 y.o.  female presenting for a three month follow-up s/p T11 and T12 compression fractures     Plan:  -Okay to d/c TLSO  -Continue follow up with pain management   -Follow-up in neurosurgery clinic PRN  -Call or return to neurosurgery office sooner if symptoms worsen or if new issues arise in the interim.     Electronically signed by Izabel Shaffer PA-C on 5/5/2021 at 11:16 AM

## 2021-08-27 NOTE — LETTER
[place] Department Medicaid  CERTIFICATION OF NECESSITY  FOR NON-EMERGENCY TRANSPORTATION   BY GROUND AMBULANCE      Individual Information   1. Name: Elmer Chirinos 2. [place] Medicaid Billing Number:    3. Address: [address] [place] [id number]      Transportation Provider Information   4. Provider Name: [facility]   5. [place] Medicaid Provider Number:  National Provider Identifier (NPI):      Certification  7. Criteria:  During transport, this individual requires:  [x] Medical treatment or continuous     supervision by an EMT. [] The administration or regulation of oxygen by another person. [] Supervised protective restraint. 8. Period Beginning Date: 09/02/2021   9. Length  [x] Not more than 1 day(s)  [] One Year     Additional Information Relevant to Certification   10. Comments or Explanations, If Necessary or Appropriate     Hospice, End Stage CHF, Alert to Person Only, bilateral pleural effusions, Lymphedema of lower extremity     Certifying Practitioner Information   11. Name of Practitioner:  Dr Joy Benson MD   12. [place] Medicaid Provider Number, If Applicable:  [address] Provider Identifier (NPI):      Signature Information   14. Date of Signature:  09/02/2021 15. Name of Person Signing: Balbina Shaw RN, BSN   16. Signature and Professional Designation:  Electronically signed by Balbina Shaw RN on 9/2/21 at 10:52 AM EDT       [facility] [address]  Rev. 7/2015      [address] [place] [address] Encounter Date/Time: 8/27/2021 [address] [facility] Account: [de-identified]    MRN: [id number]    Patient: Elmer Chirinos    Contact Serial #: [id number]      ENCOUNTER          Patient Class: I Private Enc? No Unit RM BD: SEYZ 8SE [facility] 8509/8509-B   Hospital Service:  INM   Encounter DX: Pleural effusion, left [*   ADM Provider: Joy Benson MD   Procedure:     ATT Provider: Joy Benson MD   REF Provider:        Admission DX: Pleural effusion, left, Acute respiratory failure with hypoxia ([facility] [address] Acute decompensated heart failure ([facility] [address] and DX codes: J90, J96.01, I50.9      PATIENT                 Name: Mable Ortiz : 3/14/1933 (88 yrs)   Address: [address] Sex: Female   City: [address] [place] [address]         Marital Status:    Employer: RETIRED         Religious: [employer] [[email or web address]]   Primary Care Provider: Lisa Winkler MD         Primary Phone: [phone]   EMERGENCY CONTACT   Contact Name Legal Guardian? Relationship to Patient Home Phone Work Phone   1. RuizDamaris  2. Darrin Dia      Child  Child [phone]              GUARANTOR            Guarantor: Mable Ortiz     : 3/14/1933   Address: [address] [place] [address] Sex: Female     [address] [place] [address]     Relation to Patient: Self       Home Phone: [phone]   Guarantor ID: [id number]       Work Phone:     Guarantor Employer: RETIRED         Status: RETIRED      COVERAGE        PRIMARY INSURANCE   Payor: MEDICARE Plan: MEDICARE PART A AND B   Payor Address: [facility] [address],  [facility] [address], [facility] [address]       Group Number:   Insurance Type: INDEMNITY   Subscriber Name: Brittany Lawson : 1933   Subscriber ID: [id number] [facility]. Rel. to Sub: Self   SECONDARY INSURANCE   Payor: [employer] Plan: Katy Baez 61*   Payor Address:  [facility] [id number], [place], [place] [address]          Group Number: Plan N Insurance Type: INDEMNITY   Subscriber Name: Brittany Lawson : 3/14/1933   Subscriber ID: [id number] Pat.  Rel. to Sub: SELF

## 2021-08-28 PROBLEM — I50.9 ACUTE DECOMPENSATED HEART FAILURE (HCC): Status: ACTIVE | Noted: 2021-01-01

## 2021-08-28 NOTE — PROGRESS NOTES
Patient pulled out IV, wont keep oxygen tubing in nose, and is trying to get out of bed. Called for tele-sitter.  They said that we are first on waiting list.

## 2021-08-28 NOTE — ED PROVIDER NOTES
ED PROVIDER NOTE    Chief Complaint   Patient presents with    Shortness of Breath     from home, came in for SOB/CP  x 2 days was seen at doctors office for same. 68% on RA per EMS. Patient 98% on 6L NC during triage.  Chest Pain       HPI:  8/27/21,   Time: 10:34 PM EDT       Maryhelen Bosworth is a 80 y.o. female presenting to the ED for shortness of breath and chest pain. Patient unable to provide history due to dementia. Per EMS they were called for chest pain and shortness of breath. Daughter reports that patient has been having chest pain and shortness of breath. Yesterday patient wasn't feeling well, went to PCP, HR was 136bpm, started on metoprolol and HR has been better since then. Tonight patient became confused and more short of breath. Room air sats 68% for EMS, improved w/ NRB. At neuro baseline per family. No recent fever, chills, cough, nausea, vomiting, abdominal pain, diarrhea.     Chart review: hx of HFpEF, HTN, HLD, PVD, anxiety, esophageal ca, LORI, moderate MS/AS  Lab Results   Component Value Date    LVEF 65 11/20/2020       Review of Systems:     Review of Systems   Unable to perform ROS: Dementia         --------------------------------------------- PAST HISTORY ---------------------------------------------  Past Medical History:   Past Medical History:   Diagnosis Date    Anxiety     Arthritis     Back pain     Bilateral carotid bruits 11/28/2018    Blood circulation, collateral     Cancer of esophagus (HCC)     Carotid artery stenosis     Carotid bruit 9/20/2012    Carotid stenosis, right 9/20/2012    GERD (gastroesophageal reflux disease)     GERD (gastroesophageal reflux disease)     H/O burning pain in leg 11/28/2018    Hypertension     Leg swelling 11/28/2018    Lymphedema of lower extremity 9/20/2012    Mixed hyperlipidemia 8/13/2018    Peripheral vascular disease ([facility] [address]     Spider veins 11/28/2018    Varicose veins of right lower extremity with pain 11/28/2018 Past Surgical History:   Past Surgical History:   Procedure Laterality Date    CARDIAC SURGERY      heart catherization in early 1970's    COLONOSCOPY      ECHO COMPL W DOP COLOR FLOW  11/6/2011         ESOPHAGUS SURGERY      HYSTERECTOMY      UPPER GASTROINTESTINAL ENDOSCOPY  11-2-2011    UPPER GASTROINTESTINAL ENDOSCOPY  05/2017       Social History:   Social History     Socioeconomic History    Marital status:      Spouse name: Not on file    Number of children: Not on file    Years of education: Not on file    Highest education level: Not on file   Occupational History    Not on file   Tobacco Use    Smoking status: Never Smoker    Smokeless tobacco: Never Used   Vaping Use    Vaping Use: Never used   Substance and Sexual Activity    Alcohol use: No     Alcohol/week: 0.0 standard drinks    Drug use: No    Sexual activity: Not Currently   Other Topics Concern    Not on file   Social History Narrative    Not on file     Social Determinants of Health     Financial Resource Strain:     Difficulty of Paying Living Expenses:    Food Insecurity:     Worried About Running Out of Food in the Last Year:     [address] in the Last Year:    Transportation Needs:     Lack of Transportation (Medical):      Lack of Transportation (Non-Medical):    Physical Activity:     Days of Exercise per Week:     Minutes of Exercise per Session:    Stress:     Feeling of Stress :    Social Connections:     Frequency of Communication with Friends and Family:     Frequency of Social Gatherings with Friends and Family:     Attends [employer] Services:     Active Member of Clubs or Organizations:     Attends Club or Organization Meetings:     Marital Status:    Intimate Partner Violence:     Fear of Current or Ex-Partner:     Emotionally Abused:     Physically Abused:     Sexually Abused:        Family History:   Family History   Problem Relation Age of Onset    Heart Disease Sister    [facility] RBC 3.45 (*)     Hemoglobin 10.6 (*)     Hematocrit 33.6 (*)     MCHC 31.5 (*)     Neutrophils % 83.3 (*)     Lymphocytes % 6.6 (*)     Neutrophils Absolute 7.73 (*)     Lymphocytes Absolute 0.61 (*)     Eosinophils Absolute 0.01 (*)     All other components within normal limits   COMPREHENSIVE METABOLIC PANEL - Abnormal; Notable for the following components:    Glucose 133 (*)     Albumin 3.4 (*)     Alkaline Phosphatase 113 (*)     All other components within normal limits   TROPONIN - Abnormal; Notable for the following components:    Troponin, High Sensitivity 30 (*)     All other components within normal limits   BRAIN NATRIURETIC PEPTIDE - Abnormal; Notable for the following components:    Pro-BNP 2,674 (*)     All other components within normal limits   URINALYSIS WITH MICROSCOPIC - Abnormal; Notable for the following components:    Ketones, Urine TRACE (*)     Urobilinogen, Urine 2.0 (*)     Bacteria, UA RARE (*)     All other components within normal limits   BLOOD GAS, ARTERIAL - Abnormal; Notable for the following components:    pH, Blood Gas 7.330 (*)     PCO2 56.4 (*)     HCO3 29.1 (*)     All other components within normal limits   RESPIRATORY PANEL, MOLECULAR, WITH COVID-19   CULTURE, BLOOD 1   CULTURE, BLOOD 2   CULTURE, URINE   MAGNESIUM   LACTIC ACID, PLASMA   ARTERIAL BLOOD GAS, RESPIRATORY ONLY       RADIOLOGY:  Interpreted personally and by Radiologist.  XR CHEST PORTABLE   Final Result   Cardiomegaly with pulmonary vascular congestive changes in moderate-sized   left pleural effusion. EKG:  This EKG is signed and interpreted by the EP. Normal sinus rhythm, vent rate 86bpm, normal axis and intervals, no acute injury pattern, no clinically significant change compared w/ prior EKG       ------------------------- NURSING NOTES AND VITALS REVIEWED ---------------------------   The nursing notes within the ED encounter and vital signs as below have been reviewed by myself.   There were no vitals taken for this visit. Oxygen Saturation Interpretation: Abnormal    The patients available past medical records and past encounters were reviewed. ------------------------------ ED COURSE/MEDICAL DECISION MAKING----------------------  Medications   nitroglycerin (NITRO-BID) 2 % ointment 0.5 inch (0.5 inches Topical Given 21 0054)   furosemide (LASIX) injection 40 mg (40 mg IntraVENous Given 21 0029)     Critical Care: Please note that the withdrawal or failure to initiate urgent interventions for this patient would likely result in a life threatening deterioration or permanent disability. Accordingly this patient received 30 minutes of critical care time, excluding separately billable procedures. Counseling: The emergency provider has spoken with the patient and daughter and discussed todays results, in addition to providing specific details for the plan of care and counseling regarding the diagnosis and prognosis. Questions are answered at this time and they are agreeable with the plan. ED Course/Medical Decision Makin y.o. female here with respiratory distress. Hypoxic to 60s on room air on scene for EMS. Transitioned to nasal cannula on arrival, hypoxic on 4L, O2 sats stabilized on 6L nc. Normal work of breathing. Clinically volume overloaded w/ JVD, pulmonary edema and pleural effusion on CXR. BP improving w/ nitro paste. Started diuresis w/ IV lasix. COVID negative. No fever or leukocytosis, blood cxs pending. Admitted in stable condition for further management.       --------------------------------- IMPRESSION AND DISPOSITION ---------------------------------    IMPRESSION  1. Acute decompensated heart failure ([address]    2. Pleural effusion, left    3. Acute respiratory failure with hypoxia (HCC)        DISPOSITION  Disposition: Admit to telemetry  Patient condition is stable    NOTE: This report was transcribed using voice recognition software.  Every effort was made to ensure accuracy; however, inadvertent computerized transcription errors may be present    Franchesca Moore MD  Attending Emergency Physician         Franchesca Moore MD  08/28/21 8616

## 2021-08-28 NOTE — ED NOTES
Bed: 15  Expected date:   Expected time:   Means of arrival: [facility] / [facility]  Comments:  Buck Hernandez RN  08/27/21 2028

## 2021-08-29 NOTE — PLAN OF CARE
Problem: Injury - Risk of, Physical Injury:  Goal: Absence of physical injury  Description: Absence of physical injury  Outcome: Met This Shift     Problem: Injury - Risk of, Physical Injury:  Goal: Will remain free from falls  Description: Will remain free from falls  Outcome: Met This Shift     Problem: Mood - Altered:  Goal: Mood stable  Description: Mood stable  Outcome: Met This Shift

## 2021-08-29 NOTE — CONSULTS
Noted consult for CHF diet guidelines. Pt w/ hx dementia, currently disoriented, inappropriate for education at this time.  Will follow  Electronically signed by Qamar Baird MS, RD, LD on 8/29/2021 at 1:09 PM

## 2021-08-29 NOTE — PROGRESS NOTES
Patient's daughter Dez Young  Is concerned the patient is not receiving her Tramadol that she normally takes at home and the patient is having a lot of lower back pain. She is also asking if we could get a lumbar xray since the patient had a recent fall and has been unable to ambulate due to back pain. This RN left a voicemail for Dr. Mikey Smith regarding the above information. Patient's sister updated at the bedside of above.

## 2021-08-29 NOTE — PROGRESS NOTES
(Temporal)   Resp 16   Ht 4' 9\" (1.448 m)   Wt 102 lb 1.2 oz (46.3 kg)   SpO2 97%   BMI 22.09 kg/m²   Patient Vitals for the past 96 hrs (Last 3 readings):   Weight   08/29/21 0545 102 lb 1.2 oz (46.3 kg)     OBJECTIVE:    HEENT: PERRL, EOM  Intact; sclera non-icteric, conjunctiva pink. Carotids are brisk in upstroke with normal contour. No carotid bruits. Normal jugular venous pulsation at 45°. No palpable cervical nor supraclavicular nodes. Thyroid not palpable. Trachea midline. Chest: Even excursion  Lungs: CTA B, no expiratory wheezes or rhonchi, no decreased tactile fremitus without inspiratory rales. Heart: Regular  rhythm; S1 > S2, no gallop or murmur. No clicks, rub, palpable thrills   or heaves. PMI nondisplaced, 5th intercostal space MCL. Abdomen: Soft, nontender, nondistended,  mildly protuberant, no masses or organomegaly. Bowel sounds active. Extremities: Without clubbing, cyanosis or edema. Pulses present 3+ upper extermities bilaterally; present 1+ DP and present 1+ PT bilaterally. Data:   Scheduled Meds: Reviewed  Continuous Infusions:    sodium chloride         Intake/Output Summary (Last 24 hours) at 8/29/2021 1957  Last data filed at 8/29/2021 1302  Gross per 24 hour   Intake 50 ml   Output 825 ml   Net -775 ml     CBC:   Recent Labs     08/27/21 2243   WBC 9.3   HGB 10.6*   HCT 33.6*        BMP:  Recent Labs     08/27/21  2243 08/27/21  2243 08/28/21  0624 08/28/21  0624 08/29/21  0611     --  140  --  139   K 4.6  --  4.1  --  3.6     --  102  --  95*   CO2 28  --  28  --  35*   BUN 18  --  15  --  21   CREATININE 0.7  --  0.6  --  0.7   LABGLOM >60   < > >60   < > >60    < > = values in this interval not displayed. ABGs:   Lab Results   Component Value Date    PH 7.330 08/27/2021    PO2 99.9 08/27/2021    PCO2 56.4 08/27/2021     INR: No results for input(s): INR in the last 72 hours.   PRO-BNP:   Lab Results   Component Value Date    PROBNP 2,674 (H) 08/27/2021    PROBNP 575 (H) 08/16/2018      TSH:   Lab Results   Component Value Date    TSH 1.220 11/20/2020      Cardiac Injury Profile: No results for input(s): CKTOTAL, CKMB, TROPONINI in the last 72 hours. Lipid Profile:   Lab Results   Component Value Date    TRIG 94 08/29/2021    HDL 38 08/29/2021    LDLCALC 79 08/29/2021    CHOL 136 08/29/2021      Hemoglobin A1C: No components found for: HGBA1C     RAD:   XR CHEST PORTABLE    Result Date: 8/27/2021  EXAMINATION: ONE XRAY VIEW OF THE CHEST 8/27/2021 11:02 pm COMPARISON: 11/19/2020. HISTORY: ORDERING SYSTEM PROVIDED HISTORY: hypoxic resp failure TECHNOLOGIST PROVIDED HISTORY: Reason for exam:->hypoxic resp failure What reading provider will be dictating this exam?->CRC FINDINGS: Cardiomegaly with pulmonary vascular congestive changes. Moderate-sized left pleural effusion. No pneumothorax. .  The osseous structures are without acute process. Cardiomegaly with pulmonary vascular congestive changes in moderate-sized left pleural effusion. EKG: See Report  Echo: See Report      IMPRESSIONS:  Active Problems:    Acute decompensated heart failure ([address]  Resolved Problems:    * No resolved hospital problems. *      RECOMMENDATIONS:  Await completion of two-dimensional echocardiogram and will adjust cardiac medications as needed. Will titrate afterload reduction. Continue to encourage her to eat as family member helped her this evening and she is much more alert and spontaneous. She denies any further chest discomfort or shortness of breath presently. Obviously she will need to be in a controlled environment. I have spent more than 25 minutes face to face with Maryhelen Bosworth and reviewing notes and laboratory data, with greater than 50% of this time instructing and counseling the patient and family member face to face regarding my findings and recommendations and I have answered all questions as posed to me by Ms. Dia.     Shannon Stout, DO FACP,FACC,FSCAI      NOTE:  This report was transcribed using voice recognition software.   Every effort was made to ensure accuracy; however, inadvertent computerized transcription errors may be present

## 2021-08-29 NOTE — PLAN OF CARE
Problem: Confusion - Acute:  Goal: Absence of continued neurological deterioration signs and symptoms  Description: Absence of continued neurological deterioration signs and symptoms  8/28/2021 2143 by Stef Hudson RN  Outcome: Met This Shift  8/28/2021 1227 by Tatianna Nunn RN  Outcome: Not Met This Shift  Goal: Mental status will be restored to baseline  Description: Mental status will be restored to baseline  Outcome: Met This Shift     Problem: Discharge Planning:  Goal: Ability to perform activities of daily living will improve  Description: Ability to perform activities of daily living will improve  Outcome: Met This Shift  Goal: Participates in care planning  Description: Participates in care planning  Outcome: Met This Shift     Problem: Injury - Risk of, Physical Injury:  Goal: Absence of physical injury  Description: Absence of physical injury  8/28/2021 2143 by Stef Hudson RN  Outcome: Met This Shift  8/28/2021 1227 by Tatianna Nunn RN  Outcome: Met This Shift  Goal: Will remain free from falls  Description: Will remain free from falls  Outcome: Met This Shift     Problem: Mood - Altered:  Goal: Mood stable  Description: Mood stable  Outcome: Met This Shift  Goal: Absence of abusive behavior  Description: Absence of abusive behavior  Outcome: Met This Shift  Goal: Verbalizations of feeling emotionally comfortable while being cared for will increase  Description: Verbalizations of feeling emotionally comfortable while being cared for will increase  Outcome: Met This Shift     Problem: Psychomotor Activity - Altered:  Goal: Absence of psychomotor disturbance signs and symptoms  Description: Absence of psychomotor disturbance signs and symptoms  Outcome: Met This Shift     Problem: Sensory Perception - Impaired:  Goal: Demonstrations of improved sensory functioning will increase  Description: Demonstrations of improved sensory functioning will increase  Outcome: Met This Shift  Goal: Decrease in sensory misperception frequency  Description: Decrease in sensory misperception frequency  Outcome: Met This Shift  Goal: Able to refrain from responding to false sensory perceptions  Description: Able to refrain from responding to false sensory perceptions  Outcome: Met This Shift  Goal: Demonstrates accurate environmental perceptions  Description: Demonstrates accurate environmental perceptions  Outcome: Met This Shift  Goal: Able to distinguish between reality-based and nonreality-based thinking  Description: Able to distinguish between reality-based and nonreality-based thinking  Outcome: Met This Shift  Goal: Able to interrupt nonreality-based thinking  Description: Able to interrupt nonreality-based thinking  Outcome: Met This Shift     Problem: Sleep Pattern Disturbance:  Goal: Appears well-rested  Description: Appears well-rested  Outcome: Met This Shift     Problem: Pain:  Goal: Pain level will decrease  Description: Pain level will decrease  Outcome: Met This Shift  Goal: Control of acute pain  Description: Control of acute pain  Outcome: Met This Shift  Goal: Control of chronic pain  Description: Control of chronic pain  Outcome: Met This Shift     Problem: Skin Integrity:  Goal: Will show no infection signs and symptoms  Description: Will show no infection signs and symptoms  Outcome: Met This Shift  Goal: Absence of new skin breakdown  Description: Absence of new skin breakdown  Outcome: Met This Shift     Problem: Falls - Risk of:  Goal: Absence of physical injury  Description: Absence of physical injury  8/28/2021 2143 by Haley Turner RN  Outcome: Met This Shift  8/28/2021 1227 by David Blackman RN  Outcome: Met This Shift  Goal: Will remain free from falls  Description: Will remain free from falls  Outcome: Met This Shift

## 2021-08-29 NOTE — CONSULTS
CARDIOLOGY CONSULTATION    Patient Name:  Jaci Mcghee    :  3/14/1933    Reason for Consultation:   Apparent chest discomfort, shortness of breath and rapid heart rate    History of Present Illness:   Jaci Mcghee presents to [facility], on a few week history of increasing shortness of breath and occasional chest discomfort. She has no previous documented history of significant coronary artery disease. She came to the emergency room for further evaluation and treatment as warranted. she is presently not well oriented to time place or person. Past Medical History:   has a past medical history of Anxiety, Arthritis, Back pain, Bilateral carotid bruits, Blood circulation, collateral, Cancer of esophagus (HCC), Carotid artery stenosis, Carotid bruit, Carotid stenosis, right, GERD (gastroesophageal reflux disease), GERD (gastroesophageal reflux disease), H/O burning pain in leg, Hypertension, Leg swelling, Lymphedema of lower extremity, Mixed hyperlipidemia, Peripheral vascular disease ([address] Spider veins, and Varicose veins of right lower extremity with pain. Surgical History:   has a past surgical history that includes Hysterectomy; ECHO Compl W Dop Color Flow (2011); Colonoscopy; Cardiac surgery; Esophagus surgery; Upper gastrointestinal endoscopy (2011); and Upper gastrointestinal endoscopy (2017). Social History:   reports that she has never smoked. She has never used smokeless tobacco. She reports that she does not drink alcohol and does not use drugs. Family History:  family history includes Cancer in her brother and brother; Early Death in her brother; Heart Disease in her sister. Mother  secondary to congestive heart failure. Father infirmities of old age. Medications:  Prior to Admission medications    Medication Sig Start Date End Date Taking?  Authorizing Provider   metoprolol succinate (TOPROL XL) 25 MG extended release tablet Take 25 mg by mouth daily   Yes Historical Provider, MD   FLUoxetine (PROZAC) 10 MG tablet Take 10 mg by mouth daily   Yes Historical Provider, MD   haloperidol (HALDOL) 0.5 MG tablet Take 0.5 mg by mouth nightly   Yes Historical Provider, MD   aspirin 81 MG EC tablet Take 81 mg by mouth daily   Yes Historical Provider, MD   Multiple Vitamin (MULTIVITAMIN PO) Take 1 tablet by mouth daily   Yes Historical Provider, MD   lidocaine 4 % external patch Place 1 patch onto the skin Per son, pt wears a patch up to 20 hours per day. Yes Historical Provider, MD   Acetaminophen (TYLENOL ARTHRITIS PAIN PO) Take 650 mg by mouth every 4 hours   Yes Historical Provider, MD   gabapentin (NEURONTIN) 100 MG capsule Take by mouth. Takes 1 Capsule every morning, and 2 capsules every evening   Yes Historical Provider, MD   galantamine (RAZADYNE) 4 MG tablet Take 1 tablet by mouth daily (with breakfast) 3/23/21  Yes Brittani Ro MD   docusate sodium (COLACE) 100 MG capsule Take 100 mg by mouth daily as needed    Yes Historical Provider, MD   traMADol (ULTRAM) 50 MG tablet Take 50 mg by mouth daily. Gives a second dose if needed   Yes Historical Provider, MD   irbesartan (AVAPRO) 300 MG tablet Take 300 mg by mouth daily   Yes Historical Provider, MD   pantoprazole (PROTONIX) 40 MG tablet Take 40 mg by mouth daily   Yes Historical Provider, MD   traZODone (DESYREL) 50 MG tablet Take 1.5 tablets by mouth nightly 8/2/19  Yes Ruth Bridges APRN - CNS   Multiple Vitamins-Minerals (PRESERVISION AREDS 2) CAPS Take 1 capsule by mouth 2 times daily    Yes Historical Provider, MD   Coenzyme Q10 (COQ10) 100 MG CAPS Take 100 mg by mouth daily    Yes Historical Provider, MD       Allergies:  Codeine and Vicodin [hydrocodone-acetaminophen]     Review of Systems:   · Constitutional: there has been no unanticipated weight loss. There's been no significant change in energy level, sleep pattern or activity level. No fever chills or rigors. · Eyes: No visual changes or diplopia. No scleral icterus. · ENT: No Headaches, hearing loss or vertigo. No mouth sores or sore throat. No change in taste or smell. · Cardiovascular: No chest discomfort, dyspnea on exertion, palpitations, loss of consciousness, no phlebitis, no claudication. · Respiratory: No cough or wheezing, no sputum production. No hemoptysis, pleuritic pain. · Gastrointestinal: No abdominal pain, appetite loss, blood in stools. No change in bowel habits. No hematemesis  · Genitourinary: No dysuria, trouble voiding or hematuria. No nocturia or increased frequency. · Musculoskeletal:  No gait disturbance, weakness or joint complaints. · Integumentary: No rash or pruritis. · Neurological: No headache, diplopia, change in muscle strength, numbness or tingling. No change in gait, balance, coordination, mood, affect, memory, mentation, behavior. · Psychiatric: No anxiety or depression. · Endocrine: No temperature intolerance. No excessive thirst, fluid intake, or urination. No tremor. · Hematologic/Lymphatic: No abnormal bruising or bleeding, blood clots or swollen lymph nodes. · Allergic/Immunologic: No nasal congestion or hives. Physical Examination:    Vital Signs: BP (!) 166/76   Pulse 78   Temp 98.4 °F (36.9 °C) (Temporal)   Resp 22   Ht 4' 9\" (1.448 m)   SpO2 93%   BMI 24.67 kg/m²   General appearance: Well preserved, mesomorphic body habitus, alert, no distress. Skin: Skin color, texture, turgor normal. No rashes or lesions. No induration or tightening palpated. Head: Normocephalic. No masses, lesions, tenderness or abnormalities  Eyes: Conjunctivae/corneas clear. PERRL, EOMs intact. Sclera non icteric. Ears: External ears normal. Canals clear. TM's clear bilaterally. Hearing normal to finger rub. Nose/Sinuses: Nares normal. Septum midline. Mucosa normal. No drainage or sinus tenderness.   Oropharynx: Lips, mucosa, and tongue normal. Oropharynx clear with no exudate seen.  Neck: Neck supple and symmetric. No adenopathy. Thyroid symmetric, normal size, without nodules. Trachea is midline. Carotids brisk in upstroke without bruits, no abnormal JVP noted at 45°. Chest: Even excursion  Lungs: Lungs clear to auscultation bilaterally. No retractions or use of accessory muscles. No tactile vocal fremitus. No rhonchi, crackles or rales. Heart:  S1 > S2. Regular rhythm. No gallop or murmur. No rub, palpable thrill or heave noted. PMI 5th intercostal space midclavicular line. Abdomen: Abdomen soft, mildly protuberant, non-tender. BS normal. No masses, organomegaly. No hernia noted. Extremities: Extremities normal. No deformities, edema, or skin discoloration. No cyanosis or clubbing noted to the nails. Peripheral pulses present 2+ upper extremities and barely palpable  lower extremities. Musculoskeletal: Spine ROM normal. Muscular strength intact. Neuro: Cranial nerves intact. Motor: Strength 5/5 in all extremities. Reflexes 2+ in all extremities. No focal weakness. Sensory: grossly normal to touch. Coordination intact. Pertinent Labs:  CBC:   Recent Labs     08/27/21 2243   WBC 9.3   HGB 10.6*        BMP:  Recent Labs     08/27/21  2243 08/27/21  2243 08/28/21  0624     --  140   K 4.6  --  4.1     --  102   CO2 28  --  28   BUN 18  --  15   CREATININE 0.7  --  0.6   GLUCOSE 133*  --  115*   LABGLOM >60   < > >60    < > = values in this interval not displayed. ABGs:   Lab Results   Component Value Date    PH 7.330 08/27/2021    PO2 99.9 08/27/2021    PCO2 56.4 08/27/2021     INR: No results for input(s): INR in the last 72 hours. PRO-BNP:   Lab Results   Component Value Date    PROBNP 2,674 (H) 08/27/2021    PROBNP 575 (H) 08/16/2018      Cardiac Injury Profile: No results for input(s): CKTOTAL, CKMB, CKMBINDEX, TROPONINI in the last 72 hours.    Lipid Profile:   Lab Results   Component Value Date    TRIG 97 08/07/2018    HDL 75 08/07/2018    [address] [employer] 132 08/07/2018    CHOL 226 08/07/2018      Thyroid:   Lab Results   Component Value Date    TSH 1.220 11/20/2020      Hemoglobin A1C: No components found for: HGBA1C   ECG:  See report    Radiology:  XR CHEST PORTABLE    Result Date: 8/27/2021  EXAMINATION: ONE XRAY VIEW OF THE CHEST 8/27/2021 11:02 pm COMPARISON: 11/19/2020. HISTORY: ORDERING SYSTEM PROVIDED HISTORY: hypoxic resp failure TECHNOLOGIST PROVIDED HISTORY: Reason for exam:->hypoxic resp failure What reading provider will be dictating this exam?->CRC FINDINGS: Cardiomegaly with pulmonary vascular congestive changes. Moderate-sized left pleural effusion. No pneumothorax. .  The osseous structures are without acute process. Cardiomegaly with pulmonary vascular congestive changes in moderate-sized left pleural effusion. Assessment:    Active Problems:    Acute decompensated heart failure ([address]  Resolved Problems:    * No resolved hospital problems. *      Plan: We will obtain two-dimensional echocardiogram and place her on nitrates and beta-blockers. as well as potential for neprilysin inhibitor and diuretic. I have spent more than 45minutes face to face with Don Beltran reviewing notes and laboratory data with greater than 50% of this time instructing and counseling the patient  regarding my findings and recommendations and I have answered all questions as posed to me by Ms. Dia. Thank you, Nelida Castillo MD for allowing me to consult in the care of this patient. Edinson Stephens DO DO, FACP, [facility] [place], [facility]    NOTE:  This report was transcribed using voice recognition software. Every effort was made to ensure accuracy; however, inadvertent computerized transcription errors may be present.

## 2021-08-29 NOTE — H&P
Agusto Rogers M.D. History and Physical      CHIEF COMPLAINT: Shortness of breath, chest discomfort    Reason for Admission: As above, CHF exacerbation    History Obtained From: Patient son/EMR    HISTORY OF PRESENT ILLNESS:      The patient is a 80 y.o. female of Lina Ho MD with significant past medical history of hypertension, hyperlipidemia, dementia who presents with complaints of generalized weakness and fatigue and complaining of not being able to breathe and some chest pressure according to the son. Patient herself has dementia and unable to answer any questions for me in a meaningful way. On my evaluation she indicates she has to have a bowel movement and is therefore preoccupied with that and not really able to tell me if she is short of breath or having any chest discomfort. She is resting comfortably and does not appear to be in distress. Evaluation in the emergency department with normal blood work with the exception of elevated proBNP close to 3000. Patient has followed with Dr. Renetta Broussard in the past.  Most recent echocardiogram on the chart is from about 1 year ago November 2020 which shows an ejection fraction of 55%.             All labs personally reviewed   All imaging personally reviewed     Past Medical History:        Diagnosis Date    Anxiety     Arthritis     Back pain     Bilateral carotid bruits 11/28/2018    Blood circulation, collateral     Cancer of esophagus (HCC)     Carotid artery stenosis     Carotid bruit 9/20/2012    Carotid stenosis, right 9/20/2012    GERD (gastroesophageal reflux disease)     GERD (gastroesophageal reflux disease)     H/O burning pain in leg 11/28/2018    Hypertension     Leg swelling 11/28/2018    Lymphedema of lower extremity 9/20/2012    Mixed hyperlipidemia 8/13/2018    Peripheral vascular disease ([address]     Spider veins 11/28/2018    Varicose veins of right lower extremity with pain 11/28/2018 Past Surgical History:        Procedure Laterality Date    CARDIAC SURGERY      heart catherization in early 1970's    COLONOSCOPY      ECHO COMPL W DOP COLOR FLOW  11/6/2011         ESOPHAGUS SURGERY      HYSTERECTOMY      UPPER GASTROINTESTINAL ENDOSCOPY  11-2-2011    UPPER GASTROINTESTINAL ENDOSCOPY  05/2017         Medications Prior to Admission:    Medications Prior to Admission: metoprolol succinate (TOPROL XL) 25 MG extended release tablet, Take 25 mg by mouth daily  FLUoxetine (PROZAC) 10 MG tablet, Take 10 mg by mouth daily  haloperidol (HALDOL) 0.5 MG tablet, Take 0.5 mg by mouth nightly  aspirin 81 MG EC tablet, Take 81 mg by mouth daily  Multiple Vitamin (MULTIVITAMIN PO), Take 1 tablet by mouth daily  lidocaine 4 % external patch, Place 1 patch onto the skin Per son, pt wears a patch up to 20 hours per day. Acetaminophen (TYLENOL ARTHRITIS PAIN PO), Take 650 mg by mouth every 4 hours  gabapentin (NEURONTIN) 100 MG capsule, Take by mouth. Takes 1 Capsule every morning, and 2 capsules every evening  galantamine (RAZADYNE) 4 MG tablet, Take 1 tablet by mouth daily (with breakfast)  docusate sodium (COLACE) 100 MG capsule, Take 100 mg by mouth daily as needed   traMADol (ULTRAM) 50 MG tablet, Take 50 mg by mouth daily. Gives a second dose if needed  irbesartan (AVAPRO) 300 MG tablet, Take 300 mg by mouth daily  pantoprazole (PROTONIX) 40 MG tablet, Take 40 mg by mouth daily  traZODone (DESYREL) 50 MG tablet, Take 1.5 tablets by mouth nightly  Multiple Vitamins-Minerals (PRESERVISION AREDS 2) CAPS, Take 1 capsule by mouth 2 times daily   Coenzyme Q10 (COQ10) 100 MG CAPS, Take 100 mg by mouth daily     Allergies:  Codeine and Vicodin [hydrocodone-acetaminophen]    Social History:   TOBACCO:   reports that she has never smoked. She has never used smokeless tobacco.  ETOH:   reports no history of alcohol use.   MARITAL STATUS:    OCCUPATION:      Family History:       Problem Relation Age of Onset in the last 72 hours. ASSESSMENT:      Active Problems:    Acute decompensated heart failure ([address]  Resolved Problems:    * No resolved hospital problems.  *        PLAN:    Admitted for evaluation of volume overload / CHF / pl effusion   H/o dementia  IV diuresis , if no resolution of fluid on repat xray , then pulm eval for tap  Echo, last ECHO EF 55% November 2020  CArds evaluation - known to dr. Jose Tejada and lipids   PRN haldol for agitation / sundowning     Discussed with son in ER  On admission     DVT prophylaxis  PT OT  Discharge plan    Solis Arango MD  8/28/2021  9:19 PM

## 2021-08-30 NOTE — CARE COORDINATION
Care Coordination  The patient was admitted due to sob and chf. The patient has a pmh of dementia. She had a bnp of 3000 on admission. The patient had a visiter who helped me with her information. She lives at home and she has a t [facility] that over sees her care at home. She lives in a 1 story 2 steps to get in. She has a ww,bsc and a wc at home. Her PCP is [facility]. Her pharmacy is [address] [place] [address]. She may need some skilled care at discharge and pt ot is ordered.  I will follow

## 2021-08-30 NOTE — CONSULTS
Pt seen and examined  Dictated  History of T11/12 compression fractures with continued pain  -await thoracic MRI results    Agree with above    Thelma Blair MD

## 2021-08-30 NOTE — PROGRESS NOTES
OT SESSION ATTEMPT     Date:2021  Patient Name: Franklin Petersen  MRN: [id number]  : 3/14/1933  [facility]     Attempted OT session this date:    [] unavailable due to other medical staff currently with pt   [x] on hold, await MRI/ neurosurgical recommendations. [] on hold per nursing staff secondary to lab / radiology results    [] declined Occupational Therapy  this date due to ___. Benefits of participation in therapy reviewed with pt.    [] off unit   [] Other:     Will reattempt OT eval at a later time.     [place], [place] [address]

## 2021-08-30 NOTE — CONSULTS
[address]                  [address] [facility],  [facility]                                  CONSULTATION    PATIENT NAME: Jorge Lund                 :        1933  MED REC NO:   [id number]                            ROOM:       8509  ACCOUNT NO:   [de-identified]                           ADMIT DATE: 2021  PROVIDER:     NORI Ashraf    CONSULT DATE:  2021    NEUROSURGICAL CONSULTATION    REASON FOR CONSULTATION:  Back pain. HISTORY OF PRESENT ILLNESS:  This is an 42-year-old female who presented  to the ED for shortness of breath and chest pain. It was noted that she  was also having back pain. She has had back pain for months. We have  seen her multiple times for back pain in the past and she has had acute  T11 and T12 compression fractures in , and this was treated  conservatively with a brace and she is going on to do fairly well. Really no new pain, the back pain continues to be mild-to-moderate and  chronic in nature, she is having in the middle of the back and it does  not radiate. No leg pain, numbness or weakness. No new bowel or  bladder changes. PAST MEDICAL HISTORY:  Significant for hypertension, fatigue, gastric  lymphoma, dysphasia, osteoporosis, depression. MEDICATIONS:  Please see electronic medical record. SOCIAL HISTORY:  No tobacco or alcohol use reported. ALLERGIES:  CODEINE and VICODIN.     Past Surgical History:   Procedure Laterality Date    CARDIAC SURGERY      heart catherization in early     COLONOSCOPY      ECHO COMPL W DOP COLOR FLOW  2011         ESOPHAGUS SURGERY      HYSTERECTOMY      UPPER GASTROINTESTINAL ENDOSCOPY  2011    UPPER GASTROINTESTINAL ENDOSCOPY  2017     Family History   Problem Relation Age of Onset    Heart Disease Sister     Cancer Brother     Cancer Brother     Early Death Brother      Scheduled Meds:  Continuous Infusions:  PRN Meds:.      REVIEW OF SYSTEMS:    HEENT: negative for headache or double vision  CVS: negative for chest pain  Resp: negative for SOB  GI: negative for nausea or vomiting  : negative for hematuria  Heme: positive for easy bruising  ID: negative for recent infection  Musculoskeletal: positive for back pain  Psych: negative for anxiety but positive for depression  Neuro: negative for seizure    PHYSICAL EXAMINATION:    GENERAL:  Well-developed, well-nourished female,  appearing her stated age, in no acute distress. She is alert and Oriented to herself. NEUROLOGIC:  Cranial nerves II through XII are  grossly intact. SKIN:  Warm and dry. RESPIRATORY:  No respiratory  stress. ABDOMEN:  No significant abdominal distention. EXTREMITIES: She is moving all her extremities well with no focal motor or sensory  deficit appreciated. IMPRESSION:  An 77-year-old female with chronic back pain and history of  T11 and T12 compression fractures in the past.  X-rays done on this  admission of the lumbar spine again demonstrated T11 and T12 compression  fractures. PLAN:  MRI of the thoracic spine is already ordered. We will await the  results to evaluate for any acute findings. NORI MARSH    I have interviewed and examined the patient and agree with above.   Await MRI results to determine acuity of fracture    Sp Martin MD      D: 08/30/2021 12:30:11       T: 08/30/2021 12:33:56     [email or web address]  Job#: [id number]     Doc#: [id number]    CC:

## 2021-08-30 NOTE — PROGRESS NOTES
Subjective:    No acute events or issues overnight  Ongoing pain in back  Objective:    BP (!) 163/74   Pulse 60   Temp 99.5 °F (37.5 °C) (Temporal)   Resp 16   Ht 4' 9\" (1.448 m)   Wt 101 lb 3.1 oz (45.9 kg)   SpO2 97%   BMI 21.90 kg/m²     In: -   Out: 300   In: -   Out: 300 [Urine:300]    General appearance: NAD, conversant  HEENT: AT/NC, MMM  Neck: FROM, supple  Lungs: Clear to auscultation  CV: RRR, no MRGs  Vasc: Radial pulses 2+  Abdomen: Soft, non-tender; no masses or HSM  Extremities: No peripheral edema or digital cyanosis  Skin: no rash, lesions or ulcers  Psych: Alert and oriented to person, place and time  Neuro: Alert and interactive     Recent Labs     08/27/21  2243   WBC 9.3   HGB 10.6*   HCT 33.6*          Recent Labs     08/28/21  0624 08/29/21  0611 08/30/21  0614    139 142   K 4.1 3.6 3.0*    95* 97*   CO2 28 35* 34*   BUN 15 21 27*   CREATININE 0.6 0.7 0.7   CALCIUM 8.5* 9.2 8.9       Assessment:    Active Problems:    Acute decompensated heart failure (HCC)  Resolved Problems:    * No resolved hospital problems.  *      Plan:    Patient admitted to telemetry for evaluation of CHF  Continue IV diuresis-Bumex 1 mg IV twice daily, transition to p.o. twice daily if okay with cardiology  Cardiology input appreciated  Await echocardiogram-still pending   cycle troponins to ensure no acute event leading to flash pulmonary edema-unremarkable  Monitor pleural effusion, may need to be tapped if no improvement with diuretic  Supplement electrolytes  Daily labs  Toradol every 6 hours as needed as at home  Check lumbar x-ray secondary to patient sustained a fall a few days ago and complaining of pain since then per family  Compression deformity at T11 not significantly changed, appears to be old patient complaining of pain -neurosurgery evaluation    DVT Prophylaxis   PT/OT  Discharge planning           Samir Ovalle MD  9:44 AM  8/30/2021

## 2021-08-31 NOTE — PROGRESS NOTES
PROGRESS NOTE       PATIENT PROBLEM LIST:  Active Problems:    Acute decompensated heart failure ([address]  Resolved Problems:    * No resolved hospital problems. *      SUBJECTIVE:  Ravi Laurent seems so much more withdrawn this evening as compared to yesterday. She denies shortness of breath no chest discomfort but appears somewhat confused to time place and person. REVIEW OF SYSTEMS:  General ROS: negative for - fatigue, malaise,  weight gain or weight loss  Psychological ROS: negative for - anxiety , depression  Ophthalmic ROS: negative for - decreased vision or visual distortion. ENT ROS: negative  Allergy and Immunology ROS: negative  Hematological and Lymphatic ROS: negative  Endocrine: no heat or cold intolerance and no polyphagia, polydipsia, or polyuria  Respiratory ROS: positive for - shortness of breath-improved   Cardiovascular ROS: positive for - dyspnea on exertion and shortness of breath-improved. Gastrointestinal ROS: no abdominal pain, change in bowel habits, or black or bloody stools  Genito-Urinary ROS: no nocturia, dysuria, trouble voiding, frequency or hematuria  Musculoskeletal ROS: negative for- myalgias, arthralgias, or claudication  Neurological ROS: no TIA or stroke symptoms otherwise no significant change in symptoms or problems since yesterday as documented in previous progress notes.     SCHEDULED MEDICATIONS:   nitroGLYCERIN  1 patch Transdermal QPM    aspirin  81 mg Oral Daily    docusate sodium  100 mg Oral Daily    gabapentin  100 mg Oral QAM    galantamine  4 mg Oral Daily with breakfast    losartan  100 mg Oral Daily    pantoprazole  40 mg Oral Daily    traZODone  75 mg Oral Nightly    lidocaine  1 patch Transdermal Daily    sodium chloride flush  5-40 mL IntraVENous 2 times per day    enoxaparin  40 mg SubCUTAneous Daily    gabapentin  200 mg Oral QPM    bumetanide  1 mg IntraVENous BID       VITAL SIGNS: BP (!) 151/72   Pulse 64   Temp 99.4 °F (37.4 °C) (Temporal)   Resp 16   Ht 4' 9\" (1.448 m)   Wt 101 lb 3.1 oz (45.9 kg)   SpO2 97%   BMI 21.90 kg/m²   Patient Vitals for the past 96 hrs (Last 3 readings):   Weight   08/30/21 0600 101 lb 3.1 oz (45.9 kg)   08/29/21 0545 102 lb 1.2 oz (46.3 kg)     OBJECTIVE:    HEENT: PERRL, EOM  Intact; sclera non-icteric, conjunctiva pink. Carotids are brisk in upstroke with normal contour. No carotid bruits. Normal jugular venous pulsation at 45°. No palpable cervical nor supraclavicular nodes. Thyroid not palpable. Trachea midline. Chest: Even excursion  Lungs: CTA B, no expiratory wheezes or rhonchi, no decreased tactile fremitus without inspiratory rales. Heart: Regular  rhythm; S1 > S2, no gallop or murmur. No clicks, rub, palpable thrills   or heaves. PMI nondisplaced, 5th intercostal space MCL. Abdomen: Soft, nontender, nondistended,  mildly protuberant, no masses or organomegaly. Bowel sounds active. Extremities: Without clubbing, cyanosis or edema. Pulses present 3+ upper extermities bilaterally; present 1+ DP and present 1+ PT bilaterally. Data:   Scheduled Meds: Reviewed  Continuous Infusions:    sodium chloride         Intake/Output Summary (Last 24 hours) at 8/30/2021 2350  Last data filed at 8/30/2021 1519  Gross per 24 hour   Intake 120 ml   Output 1100 ml   Net -980 ml     CBC:   No results for input(s): WBC, HGB, HCT, PLT in the last 72 hours. BMP:  Recent Labs     08/28/21  0624 08/28/21  0624 08/29/21  0611 08/29/21  0611 08/30/21  0614     --  139  --  142   K 4.1  --  3.6  --  3.0*     --  95*  --  97*   CO2 28  --  35*  --  34*   BUN 15  --  21  --  27*   CREATININE 0.6  --  0.7  --  0.7   LABGLOM >60   < > >60   < > >60    < > = values in this interval not displayed.      ABGs:   Lab Results   Component Value Date    PH 7.330 08/27/2021    PO2 99.9 08/27/2021    PCO2 recommendations and I have answered all questions as posed to me by Ms. Dia. Maureen Duarte, DO FACP,FACC,FSCAI      NOTE:  This report was transcribed using voice recognition software.   Every effort was made to ensure accuracy; however, inadvertent computerized transcription errors may be present

## 2021-08-31 NOTE — PROGRESS NOTES
Subjective: The patient is sleeping on my evaluation  Family at bedside  No acute complaints otherwise    Objective:    BP (!) 156/65   Pulse 71   Temp 97.4 °F (36.3 °C) (Temporal)   Resp 16   Ht 4' 9\" (1.448 m)   Wt 100 lb 15.5 oz (45.8 kg)   SpO2 96%   BMI 21.85 kg/m²     In: 140 [P.O.:140]  Out: 750   In: 140   Out: 750 [Urine:750]    General appearance: NAD, conversant  HEENT: AT/NC, MMM  Neck: FROM, supple  Lungs: Clear to auscultation  CV: RRR, no MRGs  Vasc: Radial pulses 2+  Abdomen: Soft, non-tender; no masses or HSM  Extremities: No peripheral edema or digital cyanosis  Skin: no rash, lesions or ulcers  Psych: Alert and oriented to person, place and time  Neuro: Alert and interactive     No results for input(s): WBC, HGB, HCT, PLT in the last 72 hours. Recent Labs     08/29/21  0611 08/30/21  0614    142   K 3.6 3.0*   CL 95* 97*   CO2 35* 34*   BUN 21 27*   CREATININE 0.7 0.7   CALCIUM 9.2 8.9       Assessment:    Active Problems:    Acute decompensated heart failure (HCC)  Resolved Problems:    * No resolved hospital problems.  *      Plan:    Patient admitted to telemetry for evaluation of CHF  Continue IV diuresis- transition to po   Await cardiology input- dr. Louisa churchill   Await echocardiogram  Cycle troponins to ensure no acute event leading to flash pulmonary edema- no elevation in enzymes   Monitor pleural effusion, may need to be tapped if no improvement with diuretic  Supplement electrolytes  Daily labs  Toradol every 6 hours as needed as at home  Check lumbar x-ray secondary to patient sustained a fall a few days ago and complaining of pain since then per family  Await MRI thoracic spine to assess acuity of t11/t12     DVT Prophylaxis   PT/OT  Discharge planning - once echo and mri done if no plans           Sirisha Harvey MD  9:48 AM  8/31/2021

## 2021-08-31 NOTE — PROGRESS NOTES
Physical Therapy    PT orders received and appreciated. Per chart, pt pending NS POC for possible spine fx. Will hold PT evaluation for now. Will continue to follow and evaluate when able.     Cory Jones, PT, DPT  [id number]

## 2021-09-01 NOTE — CARE COORDINATION
Call placed to [facility] re: [id number]. Per Lillie Hernandez, the family has decided that they do not want rehab for the patient and would like a Hospice consult to be placed. Explained that before new consults can be placed, Lilliam Aguila appeal process will need to be stopped by the family. Damaris expressed understanding and stated that her brother Justen Manley is at the bedside speaking to the rest of the family. Once they have confirmation that all the children are in agreement for Hospice, they will proceed with discontinuing the appeal process and requesting the Hospice consult. Met with Justen Manley at the bedside. Confirmed he had spoken with all of his siblings and everyone is in agreement. Family is in agreement that they would like Hospice through [facility]. Justen Manley stated that he would call and stopped the appeal process. Case number and phone number at the bedside.   Alex Sidhu notified that Appeal process being pulled by family. Call placed to Dr Jeanie Gomez and orders received to discontinue to discharge and consult Hospice. Call placed to Saritha Bell, liaison with [facility] and referral made. She will review the case and speak with the family. Will continue to follow.      Isis Bolanos RN.  [facility]  [phone]

## 2021-09-01 NOTE — PROGRESS NOTES
PROGRESS NOTE       PATIENT PROBLEM LIST:  Active Problems:    Acute decompensated heart failure ([address]  Resolved Problems:    * No resolved hospital problems. *      SUBJECTIVE:  Maryhelen Bosworth seems more alert this evening but still somewhat confused to time place and person. She denies any significant shortness of breath presently. REVIEW OF SYSTEMS:  General ROS: negative for - fatigue,and weight loss  Psychological ROS: negative for - anxiety , depression  Ophthalmic ROS: negative for - decreased vision or visual distortion. ENT ROS: negative  Allergy and Immunology ROS: negative  Hematological and Lymphatic ROS: negative  Endocrine: no heat or cold intolerance and no polyphagia, polydipsia, or polyuria  Respiratory ROS: positive for - shortness of breath-improved   Cardiovascular ROS: positive for - dyspnea on exertion and shortness of breath-improved. Gastrointestinal ROS: no abdominal pain, change in bowel habits, or black or bloody stools  Genito-Urinary ROS: no nocturia, dysuria, trouble voiding, frequency or hematuria  Musculoskeletal ROS: negative for- myalgias, arthralgias, or claudication  Neurological ROS: no TIA or stroke symptoms otherwise no significant change in symptoms or problems since yesterday as documented in previous progress notes.     SCHEDULED MEDICATIONS:   bumetanide  1 mg Oral BID    magnesium oxide  400 mg Oral Daily    nitroGLYCERIN  1 patch TransDERmal QPM    aspirin  81 mg Oral Daily    docusate sodium  100 mg Oral Daily    gabapentin  100 mg Oral QAM    galantamine  4 mg Oral Daily with breakfast    losartan  100 mg Oral Daily    pantoprazole  40 mg Oral Daily    traZODone  75 mg Oral Nightly    lidocaine  1 patch TransDERmal Daily    sodium chloride flush  5-40 mL IntraVENous 2 times per day    enoxaparin  40 mg SubCUTAneous Daily    gabapentin  200 mg Oral QPM       VITAL SIGNS: /76   Pulse 110   Temp 97.8 °F (36.6 °C) (Temporal)   Resp 16   Ht 4' 9\" (1.448 m)   Wt 100 lb 15.5 oz (45.8 kg)   SpO2 96%   BMI 21.85 kg/m²   Patient Vitals for the past 96 hrs (Last 3 readings):   Weight   08/31/21 0518 100 lb 15.5 oz (45.8 kg)   08/30/21 0600 101 lb 3.1 oz (45.9 kg)   08/29/21 0545 102 lb 1.2 oz (46.3 kg)     OBJECTIVE:    HEENT: PERRL, EOM  Intact; sclera non-icteric, conjunctiva pink. Carotids are brisk in upstroke with normal contour. No carotid bruits. Normal jugular venous pulsation at 45°. No palpable cervical nor supraclavicular nodes. Thyroid not palpable. Trachea midline. Chest: Even excursion  Lungs: CTA B, no expiratory wheezes or rhonchi, no decreased tactile fremitus without inspiratory rales. Heart: Regular  rhythm; S1 > S2, no gallop or murmur. No clicks, rub, palpable thrills   or heaves. PMI nondisplaced, 5th intercostal space MCL. Abdomen: Soft, nontender, nondistended,  mildly protuberant, no masses or organomegaly. Bowel sounds active. Extremities: Without clubbing, cyanosis or edema. Pulses present 3+ upper extermities bilaterally; present 1+ DP and present 1+ PT bilaterally. Data:   Scheduled Meds: Reviewed  Continuous Infusions:    sodium chloride         Intake/Output Summary (Last 24 hours) at 9/1/2021 1559  Last data filed at 9/1/2021 0437  Gross per 24 hour   Intake 320 ml   Output 1175 ml   Net -855 ml     CBC:   No results for input(s): WBC, HGB, HCT, PLT in the last 72 hours. BMP:  Recent Labs     08/30/21  0614 08/30/21  0614 09/01/21  0446     --  139   K 3.0*  --  3.4*   CL 97*  --  93*   CO2 34*  --  35*   BUN 27*  --  20   CREATININE 0.7  --  0.7   LABGLOM >60   < > >60    < > = values in this interval not displayed.      ABGs:   Lab Results   Component Value Date    PH 7.330 08/27/2021    PO2 99.9 08/27/2021    PCO2 56.4 08/27/2021     INR: No results for input(s): INR in the last 72 hours.  PRO-BNP:   Lab Results   Component Value Date    PROBNP 7,220 (H) 09/01/2021    PROBNP 17,286 (H) 08/30/2021      TSH:   Lab Results   Component Value Date    TSH 1.220 11/20/2020      Cardiac Injury Profile: No results for input(s): CKTOTAL, CKMB, TROPONINI in the last 72 hours. Lipid Profile:   Lab Results   Component Value Date    TRIG 94 08/29/2021    HDL 38 08/29/2021    LDLCALC 79 08/29/2021    CHOL 136 08/29/2021      Hemoglobin A1C: No components found for: HGBA1C     RAD:   XR CHEST PORTABLE    Result Date: 8/27/2021  EXAMINATION: ONE XRAY VIEW OF THE CHEST 8/27/2021 11:02 pm COMPARISON: 11/19/2020. HISTORY: ORDERING SYSTEM PROVIDED HISTORY: hypoxic resp failure TECHNOLOGIST PROVIDED HISTORY: Reason for exam:->hypoxic resp failure What reading provider will be dictating this exam?->CRC FINDINGS: Cardiomegaly with pulmonary vascular congestive changes. Moderate-sized left pleural effusion. No pneumothorax. .  The osseous structures are without acute process. Cardiomegaly with pulmonary vascular congestive changes in moderate-sized left pleural effusion. EKG: See Report  Echo: See Report      IMPRESSIONS:  Active Problems:    Acute decompensated heart failure ([address]  Resolved Problems:    * No resolved hospital problems. *      RECOMMENDATIONS:  Two-dimensional echocardiogram once again suggests a moderate to large left pleural effusion. Continue present medical regimen as ordered. I have spent more than 25 minutes face to face with Terry Archibald and reviewing notes and laboratory data, with greater than 50% of this time instructing and counseling the patient and family member face to face regarding my findings and recommendations and I have answered all questions as posed to me by Ms. Dia. Curt Stovall, DO FACP,FACC,FSCAI      NOTE:  This report was transcribed using voice recognition software.   Every effort was made to ensure accuracy; however, inadvertent computerized transcription errors may be present

## 2021-09-01 NOTE — PROGRESS NOTES
[address]       Date:2021                                                  Patient Name: Sanchez Garza  MRN: [id number]  : 3/14/1933  Room: [address] [place] [address]    Evaluating OT: Kristina Casas OTR/L [id number]    Referring Provider[de-identified] Sophia Hughes MD     Specific Provider Orders/Date: OT evaluation and treatment 21    Diagnosis:  Pleural effusion, left [J90]  Acute respiratory failure with hypoxia ([address] [J96.01]  Acute decompensated heart failure ([address] [I50.9]      Pertinent Medical History:  has a past medical history of Anxiety, Arthritis, Back pain, Bilateral carotid bruits, Blood circulation, collateral, Cancer of esophagus ([address] Carotid artery stenosis, Carotid bruit, Carotid stenosis, right, GERD (gastroesophageal reflux disease), GERD (gastroesophageal reflux disease), H/O burning pain in leg, Hypertension, Leg swelling, Lymphedema of lower extremity, Mixed hyperlipidemia, Peripheral vascular disease ([address] Spider veins, and Varicose veins of right lower extremity with pain. Precautions:  Fall Risk, spinal neutrality, bed alarm, cognition.  [place],     Assessment of current deficits   [x] Functional mobility   [x]ADLs  [x] Strength               [x]Cognition   [x] Functional transfers   [] IADLs         [x] Safety Awareness   [x]Endurance   [x] Fine Coordination              [x] Balance      [] Vision/perception   []Sensation    [x]Gross Motor Coordination  [x] ROM  [] Delirium                   [] Motor Control     OT PLAN OF CARE   OT POC based on physician orders, patient diagnosis and results of clinical assessment    Frequency/Duration 1-4  days/wk for 2 weeks PRN   Specific OT Treatment to include:   * Instruction/training on adapted ADL techniques and AE recommendations to increase functional independence within precautions       * Training on energy conservation strategies, correct breathing pattern and techniques to improve independence/tolerance for self-care routine  * Functional transfer/mobility training/DME recommendations for increased independence, safety, and fall prevention  * Patient/Family education to increase follow through with safety techniques and functional independence  * Cognitive retraining/development of therapeutic activities to improve problem solving, judgement, memory, and attention for increased safety/participation in ADL/IADL tasks  * Therapeutic exercise to improve motor endurance, ROM, and functional strength for ADLs/functional transfers  * Therapeutic activities to facilitate/challenge dynamic balance, stand tolerance for increased safety and independence with ADLs  * Therapeutic activities to facilitate gross/fine motor skills for increased independence with ADLs  * Positioning to improve skin integrity, interaction with environment and functional independence      Recommended Adaptive Equipment:  TBD     Home Living: Pt lives in a 1 story home with 4 steps to enter and 1 rail. She has family that rotate turns living with her 24/7   Bathroom setup: walk in shower with shower chair , 3:1 over commode  Equipment owned: w/c , shower chair, fww, BSC    Prior Level of Function: assist from family with ADLs , pt indep with self feeding.  assist with IADLs; ambulated with ww,   Driving: no  Occupation/leisure: not stated    Pain Level: not rated pain, back  Cognition: A&O: self, hospital, confused to month and year and situation   Follows 1 step directions: poor   Memory:  poor   Sequencing:  poor   Problem solving:  poor   Judgement/safety:  poor    Functional Assessment:   AM-PAC Daily Activity Raw Score: 8/24     Initial Eval Status  Date: 9/1/21 Treatment Status  Date: STG=LTG  Time frame: 10-14 days   Feeding Maximal Assist /dep    Minimal Assist    Grooming Maximal Assist /dep  Moderate Assist    UB Dressing Dependent   Moderate Assist    LB Dressing Dependent   Moderate Assist    Bathing Dependent  Moderate Assist    Toileting Dependent   Maximal Assist    Bed Mobility  Supine to sit: Maximal Assist   Sit to supine: Maximal Assist   Supine to sit: Minimal Assist   Sit to supine: Minimal Assist    Functional Transfers Sit to stand: NT   Unsafe for 1 person at this time. Pt unable to sit unsupported EOB. Stand to sit: NT   Stand pivot: NT   Moderate Assist    Functional Mobility NT  Mod A   Balance Sitting: max A EOB     Standing: NT  Sitting: sba      Standing: mod A   Activity Tolerance Very poor  fair   Visual/  Perceptual Glasses: yes          BUE  ROM/Strength/  Fine motor Coordination Hand dominance: R    BUE: ROM limited in shoulder, elbow and hands d/t generalized weakness     Strength: grossly 2+/5      Strength: poor     Coordination:  poor      Increase BUE strength to 3+/5 for increased independence with functional mobility/transfers     Hearing: WFL   Sensation:  No c/o numbness or tingling   Tone:  WFL   Edema:  None noted    Comments:   RN cleared patient for OT. Upon arrival patient in bed and sleeping but awoke up to her name. Pt required max/dep for all ADLs , sitting balance EOB and bed mobility. Therapist facilitated and instructed pt on adapted  techniques & compensatory strategies to improve safety and independence with basic ADLs, bed mobility,  to allow pt to achieve highest level of independence and safely. At end of session, patient in bed with son present in room with call light and phone within reach, all lines and tubes intact. Overall, patient demonstrated  decreased independence and safety during completion of ADL/functional transfer/mobility tasks. Pt would benefit from continued skilled OT to increase safety and independence with completion of ADL tasks and functional mobility for improved quality of life.        Treatment: OT treatment provided this date includes:    ADL-  Instruction/training on safety and adapted techniques for completion of eating her lunch. Pt required hand over hand assist to feed self and complete basic UE ADLs    Mobility-  Instruction/training on safety and improved independence with bed mobility therapist facilitated and provided cues for body alignment and hand/feet placement when seated EOB.   Sitting EOB x 5 minutes to improve dynamic sitting balance and activity tolerance during ADLs. Rehab Potential: Good  for established goals     Patient / Family Goal:  Not stated    Patient and/or family were instructed on functional diagnosis, prognosis/goals and OT plan of care. Demonstrated poor understanding. ·  Eval Complexity: Mod Complexity  · History: Expanded review of medical records and additional review of physical, cognitive, or psychosocial history related to current functional performance  · Exam: 5+ performance deficits  · Assistance/Modification: mod/max assistance or modifications required to perform tasks. May have comorbidities that affect occupational performance. Time In: 1:15  Time Out: 1:41  Total Treatment Time: 10 minutes    Min Units   OT Eval Low 74336       OT Eval Medium 43162  x    OT Eval High 57375       OT Re-Eval [id number]       Therapeutic Ex [id number]       Therapeutic Activities [id number]      ADL/Self Care 09929  10 1   Orthotic Management 68527       Neuro Re-Ed 12068       Non-Billable Time          Evaluation Time includes thorough review of current medical information, gathering information on past medical history/social history and prior level of function, completion of standardized testing/informal observation of tasks, assessment of data and education on plan of care and goals.             [place], [place] [id number]

## 2021-09-01 NOTE — PROGRESS NOTES
Physical Therapy  Physical Therapy Initial Assessment     Name: Allie Tubbs  : 3/14/1933  MRN: [id number]      Date of Service: 2021    Evaluating PT:  Christian Jarrell, PT, DPT [id number]    Room #:  1156/3734-Z  Diagnosis:  Pleural effusion, left [J90]  Acute respiratory failure with hypoxia ([address] [J96.01]  Acute decompensated heart failure (HCC) [I50.9]  PMHx/PSHx:  HTN, CA of esophagus, carotid artery stenosis, peripheral vascular disease, lymphedema of LE, carotid bruit, anxiety, back pain, arthritis, GERD, HLD, spider veins, varicose veins of RLE  Precautions:  Falls, cognition  Equipment Needs:  TBD    SUBJECTIVE:    Pt lives home alone in a 1 story home with 4 stairs to enter and 1 rail. Bed is on first floor and bath is on first floor. Pt ambulated with front [employer] with assistance PTA. Pt's son at bedside reports there are a total of 4 siblings that assist the pt at home and they provide 24/ care. OBJECTIVE:   Initial Evaluation  Date: 2021 Treatment Short Term/ Long Term   Goals   AM-PAC 6 Clicks      Was pt agreeable to Eval/treatment? yes     Does pt have pain?  No c/o pain     Bed Mobility  Rolling: modA  Supine to sit: maxA  Sit to supine: maxA  Scooting: maxA  Rolling: [place]  Supine to sit: [place]  Sit to supine: [place]  Scooting: [place]   Transfers Sit to stand: maxA  Stand to sit: maxA  Stand pivot: NT  Sit to stand: [place]  Stand to sit: [place]  Stand pivot: [place] front [employer]   Ambulation    NT  25 feet with front [employer] negotiation: ascended and descended  NT  4 steps with 1 rail [place]   ROM BUE:  Per OT note  BLE:  WNL     Strength BUE:  Per OT note  BLE:  Grossly 4/5     Balance Sitting EOB:  ModA, poor postural stability  Dynamic Standing:  NT  Static standing: maxA  Sitting EOB:  SBA  Dynamic Standing:  [place] front [employer]     Pt is A & O x 3 (self, place, time)  Sensation:  Pt denies numbness and tingling to extremities  Edema:  unremarkable    Patient education  Pt educated on role of PT intervention. Pt educated on safety in room with utilization of call light for assistance with mobility. Pt educated on importance of maximizing OOB time by transferring to bedside chair for meals and ambulating to bathroom/transferring to bedside commode with assistance from nursing and therapy staff to increase functional activity tolerance and overall functional independence. Patient response to education:   Pt verbalized understanding Pt demonstrated skill Pt requires further education in this area   yes yes yes     ASSESSMENT:    Conditions Requiring Skilled Therapeutic Intervention:    [x]Decreased strength     [x]Decreased ROM  [x]Decreased functional mobility  [x]Decreased balance   [x]Decreased endurance   []Decreased posture  []Decreased sensation  []Decreased coordination   []Decreased vision  [x]Decreased safety awareness   []Increased pain       Comments:  RN cleared pt for activity prior to session. Pt received supine in bed and agreeable to PT intervention at this time. Pt performed all functional mobility as noted above. Pt's son at bedside to provide social history. Pt presents with generalized weakness and deconditioning as well as baseline cognitive deficits. Pt required significant assistance to complete bed mobility, unable to assess transfers/ambulation d/t poor static standing tolerance. Pt returned to supine at end of session and left with all needs met and call light in reach. Pt requires continued skilled PT intervention for the purposes of maximizing functional mobility and independence by addressing deficits described above. Treatment:  Patient practiced and was instructed in the following treatment:     Therapeutic Activities Completed:  o Functional mobility as noted above:   - Bed mobility: as noted above. Max VC and hand over hand guidance to facilitate efficient use of BUE on bed rail to promote more independent completion of task.   At EOB pt required modA to maintain static seated balance. Max VC for improved posture and use of BUE for support with fair- follow through. - Transfer training: sit<>stand: maxA from EOB. Max VC for proper hand placement and sequencing for safe and more independent completion of task.    o Skilled repositioning in supine with HOB elevated for comfort.  o Pt education as noted above. Pt's/ family goals   1. LAURA    Prognosis is good for reaching above PT goals. Patient and or family understand(s) diagnosis, prognosis, and plan of care. yes    PHYSICAL THERAPY PLAN OF CARE:    PT POC is established based on physician order and patient diagnosis     Referring provider/PT Order:    08/30/21 1015  PT eval and treat Start: 08/30/21 1015, End: 08/30/21 1015, ONE TIME, Standing Count: 1 Occurrences, Amelia Mandujano MD     Diagnosis:  Pleural effusion, left [J90]  Acute respiratory failure with hypoxia ([address] [J96.01]  Acute decompensated heart failure ([address] [I50.9]  Specific instructions for next treatment:  Ambulate as tolerated. LE strengthening. Dynamic sitting/standing balance activities for improved overall functional mobility.     Current Treatment Recommendations:     [x] Strengthening to improve independence with functional mobility   [x] ROM to improve independence with functional mobility   [x] Balance Training to improve static/dynamic balance and to reduce fall risk  [x] Endurance Training to improve activity tolerance during functional mobility   [x] Transfer Training to improve safety and independence with all functional transfers   [x] Gait Training to improve gait mechanics, endurance and assess need for appropriate assistive device  [x] Stair Training in preparation for safe discharge home and/or into the community   [x] Positioning to prevent skin breakdown and contractures  [x] Safety and Education Training   [x] Patient/Caregiver Education   [] HEP  [] Other     PT long term treatment goals are located in above grid    Frequency of treatments: 2-5x/week x 1-2 weeks. Time in  1006  Time out  1030    Total Treatment Time  5 minutes     Evaluation Time includes thorough review of current medical information, gathering information on past medical history/social history and prior level of function, completion of standardized testing/informal observation of tasks, assessment of data and education on plan of care and goals.     CPT codes:  [x] Low Complexity PT evaluation 67183  [] Moderate Complexity PT evaluation 55181  [] High Complexity PT evaluation 63468  [] PT Re-evaluation 81168  [] Gait training 86536 0 minutes  [] Manual therapy 95927 0 minutes  [x] Therapeutic activities 01466 5 minutes  [] Therapeutic exercises 91066 0 minutes  [] Neuromuscular reeducation 00525 0 minutes     Ivory Hanley, PT, DPT  [id number]

## 2021-09-01 NOTE — PROGRESS NOTES
mg, 81 mg, Oral, Daily  docusate sodium (COLACE) capsule 100 mg, 100 mg, Oral, Daily  gabapentin (NEURONTIN) capsule 100 mg, 100 mg, Oral, QAM  galantamine (RAZADYNE) tablet 4 mg, 4 mg, Oral, Daily with breakfast  losartan (COZAAR) tablet 100 mg, 100 mg, Oral, Daily  pantoprazole (PROTONIX) tablet 40 mg, 40 mg, Oral, Daily  traZODone (DESYREL) tablet 75 mg, 75 mg, Oral, Nightly  lidocaine 4 % external patch 1 patch, 1 patch, TransDERmal, Daily  sodium chloride flush 0.9 % injection 5-40 mL, 5-40 mL, IntraVENous, 2 times per day  sodium chloride flush 0.9 % injection 10 mL, 10 mL, IntraVENous, PRN  0.9 % sodium chloride infusion, 25 mL, IntraVENous, PRN  ondansetron (ZOFRAN-ODT) disintegrating tablet 4 mg, 4 mg, Oral, Q8H PRN **OR** ondansetron (ZOFRAN) injection 4 mg, 4 mg, IntraVENous, Q6H PRN  magnesium hydroxide (MILK OF MAGNESIA) 400 MG/5ML suspension 30 mL, 30 mL, Oral, Daily PRN  acetaminophen (TYLENOL) tablet 650 mg, 650 mg, Oral, Q6H PRN **OR** acetaminophen (TYLENOL) suppository 650 mg, 650 mg, Rectal, Q6H PRN  enoxaparin (LOVENOX) injection 40 mg, 40 mg, SubCUTAneous, Daily  gabapentin (NEURONTIN) capsule 200 mg, 200 mg, Oral, QPM  metoprolol (LOPRESSOR) injection 5 mg, 5 mg, IntraVENous, Q6H PRN  haloperidol lactate (HALDOL) injection 2 mg, 2 mg, IntraMUSCular, Q6H PRN    ASSESSMENT:   No acute compression fracture on mri    PLAN:  No surgical intervention or bracing indicated  Will sign off      Electronically signed by NORI Ace on 9/1/2021 at 8:48 AM    I have interviewed and examined the patient and agree with above. Her fracture is old. No intervention.     Tee Campa MD

## 2021-09-01 NOTE — PROGRESS NOTES
CLINICAL PHARMACY NOTE: MEDS TO BEDS    Total # of Prescriptions Filled: 1   The following medications were delivered to the patient:  · Bumetanide 1 mg    Additional Documentation:

## 2021-09-01 NOTE — CARE COORDINATION
Notified by nursing patient's family filing Amirah Delarosa appeal.  Medical records notified and CM  notified. Detailed notice of discharge completed and copy provided to the patient's daughter Shannan Baeza at the bedside and confirmed Amirah [place] has been filed. IMM letter and Detailed notice provided to CM  Arvind Nixon to be faxed to medical records and records will be sent to Amirah Delarosa for case to be reviewed. HINN 12 printed. Shannan Hyattort no longer at the bedside. Will call POA to obtain consent to sign HINN 12 for chart.       Rosey Kathleen RN.  [facility]  [phone]

## 2021-09-01 NOTE — CARE COORDINATION
Echo order noted from 8/29. Call placed to David Beltran, manager with the echo department re: echo being completed today as the patient now has a discharge order. She will review the list and add patient for completion today. Will continue to follow.      Amie Phalen, RN.  [facility]  [phone]

## 2021-09-01 NOTE — PROGRESS NOTES
Subjective: The patient is awake  No acute issues  Went into A. fib RVR last night    Objective:    /89   Pulse 114   Temp 98.2 °F (36.8 °C) (Temporal)   Resp 18   Ht 4' 9\" (1.448 m)   Wt 100 lb 15.5 oz (45.8 kg)   SpO2 97%   BMI 21.85 kg/m²     In: 320 [P.O.:300; I.V.:20]  Out: 1175   In: 320   Out: 1175 [Urine:1175]    General appearance: NAD, conversant  HEENT: AT/NC, MMM  Neck: FROM, supple  Lungs: Clear to auscultation  CV: RRR, no MRGs  Vasc: Radial pulses 2+  Abdomen: Soft, non-tender; no masses or HSM  Extremities: No peripheral edema or digital cyanosis  Skin: no rash, lesions or ulcers  Psych: Alert and oriented to person, place and time  Neuro: Alert and interactive     No results for input(s): WBC, HGB, HCT, PLT in the last 72 hours. Recent Labs     08/30/21  0614 09/01/21  0446    139   K 3.0* 3.4*   CL 97* 93*   CO2 34* 35*   BUN 27* 20   CREATININE 0.7 0.7   CALCIUM 8.9 8.8       Assessment:    Active Problems:    Acute decompensated heart failure (HCC)  Resolved Problems:    * No resolved hospital problems.  *      Plan:    Patient admitted to telemetry for evaluation of CHF  Continue IV diuresis- transition to po Bumex 1 mg daily at discharge  Resume home Toprol dose-patient went into A. fib RVR last night  Await cardiology input- dr. Musa churchill   Await echocardiogram-still pending  Cycle troponins to ensure no acute event leading to flash pulmonary edema- no elevation in enzymes   Monitor pleural effusion, may need to be tapped if no improvement with diuretic  Supplement electrolytes  Daily labs  Toradol every 6 hours as needed as at home  Check lumbar x-ray secondary to patient sustained a fall a few days ago and complaining of pain since then per family  Await MRI thoracic spine to assess acuity of [id number], neurosurgery has signed off    DVT Prophylaxis   PT/OT  Discharge planning -patient is okay for discharge from medicine standpoint today to home if family so chooses versus subacute rehab          Kerrie Poon MD  9:31 AM  9/1/2021

## 2021-09-01 NOTE — PLAN OF CARE
Problem: Confusion - Acute:  Goal: Absence of continued neurological deterioration signs and symptoms  Description: Absence of continued neurological deterioration signs and symptoms  Outcome: Met This Shift  Goal: Mental status will be restored to baseline  Description: Mental status will be restored to baseline  Outcome: Met This Shift     Problem: Discharge Planning:  Goal: Ability to perform activities of daily living will improve  Description: Ability to perform activities of daily living will improve  Outcome: Met This Shift  Goal: Participates in care planning  Description: Participates in care planning  Outcome: Met This Shift     Problem: Injury - Risk of, Physical Injury:  Goal: Absence of physical injury  Description: Absence of physical injury  Outcome: Met This Shift  Goal: Will remain free from falls  Description: Will remain free from falls  Outcome: Met This Shift     Problem: Mood - Altered:  Goal: Mood stable  Description: Mood stable  Outcome: Met This Shift  Goal: Absence of abusive behavior  Description: Absence of abusive behavior  Outcome: Met This Shift  Goal: Verbalizations of feeling emotionally comfortable while being cared for will increase  Description: Verbalizations of feeling emotionally comfortable while being cared for will increase  Outcome: Met This Shift     Problem: Psychomotor Activity - Altered:  Goal: Absence of psychomotor disturbance signs and symptoms  Description: Absence of psychomotor disturbance signs and symptoms  Outcome: Met This Shift     Problem: Sensory Perception - Impaired:  Goal: Demonstrations of improved sensory functioning will increase  Description: Demonstrations of improved sensory functioning will increase  Outcome: Met This Shift  Goal: Decrease in sensory misperception frequency  Description: Decrease in sensory misperception frequency  Outcome: Met This Shift  Goal: Able to refrain from responding to false sensory perceptions  Description: Able to health status  Outcome: Met This Shift  Goal: Identification of resources available to assist in meeting health care needs will improve  Description: Identification of resources available to assist in meeting health care needs will improve  Outcome: Met This Shift     Problem: Physical Regulation:  Goal: Complications related to the disease process, condition or treatment will be avoided or minimized  Description: Complications related to the disease process, condition or treatment will be avoided or minimized  Outcome: Met This Shift

## 2021-09-01 NOTE — PROGRESS NOTES
Assumed care of patient @ 1900  Per previous RN Patient went into A-Fib w/RVR @ 1845  Upon Assessment, VSS, BP=[id number]-753d. Lopressor 5mg IV administered per orders for SBP<110  @2115 Cardiologist @ bedside assessing Patient. Will con't to monitor.     @2230 XE=[id number], remains irregular  Will con't to monitor

## 2021-09-01 NOTE — PROGRESS NOTES
PROGRESS NOTE       PATIENT PROBLEM LIST:  Active Problems:    Acute decompensated heart failure ([address]  Resolved Problems:    * No resolved hospital problems. *      SUBJECTIVE:  Tsacie Neighbors he is awake but remains generally weak and mildly confused to time place and person. REVIEW OF SYSTEMS:  General ROS:positive for - fatigue and  weight loss  Psychological ROS: negative for - anxiety , depression  Ophthalmic ROS: negative for - decreased vision or visual distortion. ENT ROS: negative  Allergy and Immunology ROS: negative  Hematological and Lymphatic ROS: negative  Endocrine: no heat or cold intolerance and no polyphagia, polydipsia, or polyuria  Respiratory ROS: positive for - shortness of breath-improved   Cardiovascular ROS: positive for - dyspnea on exertion and shortness of breath-improved. Gastrointestinal ROS: no abdominal pain, change in bowel habits, or black or bloody stools  Genito-Urinary ROS: no nocturia, dysuria, trouble voiding, frequency or hematuria  Musculoskeletal ROS: negative for- myalgias, arthralgias, or claudication  Neurological ROS: no TIA or stroke symptoms otherwise no significant change in symptoms or problems since yesterday as documented in previous progress notes.     SCHEDULED MEDICATIONS:   bumetanide  1 mg Oral BID    magnesium oxide  400 mg Oral Daily    nitroGLYCERIN  1 patch TransDERmal QPM    aspirin  81 mg Oral Daily    docusate sodium  100 mg Oral Daily    gabapentin  100 mg Oral QAM    galantamine  4 mg Oral Daily with breakfast    losartan  100 mg Oral Daily    pantoprazole  40 mg Oral Daily    traZODone  75 mg Oral Nightly    lidocaine  1 patch TransDERmal Daily    sodium chloride flush  5-40 mL IntraVENous 2 times per day    enoxaparin  40 mg SubCUTAneous Daily    gabapentin  200 mg Oral QPM       VITAL SIGNS:                                                                                                                          /84 Pulse 129   Temp 98.1 °F (36.7 °C) (Oral)   Resp 18   Ht 4' 9\" (1.448 m)   Wt 100 lb 15.5 oz (45.8 kg)   SpO2 96%   BMI 21.85 kg/m²   Patient Vitals for the past 96 hrs (Last 3 readings):   Weight   08/31/21 0518 100 lb 15.5 oz (45.8 kg)   08/30/21 0600 101 lb 3.1 oz (45.9 kg)   08/29/21 0545 102 lb 1.2 oz (46.3 kg)     OBJECTIVE:    HEENT: PERRL, EOM  Intact; sclera non-icteric, conjunctiva pink. Carotids are brisk in upstroke with normal contour. No carotid bruits. Normal jugular venous pulsation at 45°. No palpable cervical nor supraclavicular nodes. Thyroid not palpable. Trachea midline. Chest: Even excursion  Lungs: CTA B, no expiratory wheezes or rhonchi, no decreased tactile fremitus without inspiratory rales. Heart: Regular  rhythm; S1 > S2, no gallop or murmur. No clicks, rub, palpable thrills   or heaves. PMI nondisplaced, 5th intercostal space MCL. Abdomen: Soft, nontender, nondistended,  mildly protuberant, no masses or organomegaly. Bowel sounds active. Extremities: Without clubbing, cyanosis or edema. Pulses present 3+ upper extermities bilaterally; present 1+ DP and present 1+ PT bilaterally. Data:   Scheduled Meds: Reviewed  Continuous Infusions:    sodium chloride         Intake/Output Summary (Last 24 hours) at 8/31/2021 2352  Last data filed at 8/31/2021 2131  Gross per 24 hour   Intake 180 ml   Output 1375 ml   Net -1195 ml     CBC:   No results for input(s): WBC, HGB, HCT, PLT in the last 72 hours. BMP:  Recent Labs     08/29/21  0611 08/29/21  0611 08/30/21  0614     --  142   K 3.6  --  3.0*   CL 95*  --  97*   CO2 35*  --  34*   BUN 21  --  27*   CREATININE 0.7  --  0.7   LABGLOM >60   < > >60    < > = values in this interval not displayed. ABGs:   Lab Results   Component Value Date    PH 7.330 08/27/2021    PO2 99.9 08/27/2021    PCO2 56.4 08/27/2021     INR: No results for input(s): INR in the last 72 hours.   PRO-BNP:   Lab Results   Component Value Date    PROBNP 17,286 (H) 08/30/2021    PROBNP 2,674 (H) 08/27/2021      TSH:   Lab Results   Component Value Date    TSH 1.220 11/20/2020      Cardiac Injury Profile: No results for input(s): CKTOTAL, CKMB, TROPONINI in the last 72 hours. Lipid Profile:   Lab Results   Component Value Date    TRIG 94 08/29/2021    HDL 38 08/29/2021    LDLCALC 79 08/29/2021    CHOL 136 08/29/2021      Hemoglobin A1C: No components found for: HGBA1C     RAD:   XR CHEST PORTABLE    Result Date: 8/27/2021  EXAMINATION: ONE XRAY VIEW OF THE CHEST 8/27/2021 11:02 pm COMPARISON: 11/19/2020. HISTORY: ORDERING SYSTEM PROVIDED HISTORY: hypoxic resp failure TECHNOLOGIST PROVIDED HISTORY: Reason for exam:->hypoxic resp failure What reading provider will be dictating this exam?->CRC FINDINGS: Cardiomegaly with pulmonary vascular congestive changes. Moderate-sized left pleural effusion. No pneumothorax. .  The osseous structures are without acute process. Cardiomegaly with pulmonary vascular congestive changes in moderate-sized left pleural effusion. EKG: See Report  Echo: See Report      IMPRESSIONS:  Active Problems:    Acute decompensated heart failure ([address]  Resolved Problems:    * No resolved hospital problems. *      RECOMMENDATIONS:  Two-dimensional echocardiogram demonstrates fairly well preserved global left ventricle systolic function but with evidence for both a moderate to large left pleural effusion as well as a small pericardial effusion as well. Would consider readdressing left pleural effusion. I have spent more than 25 minutes face to face with Kandace Sheppard and reviewing notes and laboratory data, with greater than 50% of this time instructing and counseling the patient and family member face to face regarding my findings and recommendations and I have answered all questions as posed to me by Ms. Dia. Sarahy Enriquez, DO FACP,FACC,FSCAI      NOTE:  This report was transcribed using voice recognition software.   Every effort was made to ensure accuracy; however, inadvertent computerized transcription errors may be present

## 2021-09-01 NOTE — CARE COORDINATION
Spoke with PT after evaluation completed. Patient will need rehab prior to returning home. Met with patient and her daughter Araceli Youngblood [phone], at the bedside to discuss transition of care plan. Explained brief conversation with PT and that their recommendation at this time is for rehab. [address] list provided to Art. Art with multiple questions re: her mother's care and treatment plans. Asking to speak with the doctor and stated she needs to speak with her family before they can provide choices. Message sent to Dr Yao Aceves with [facility] contact information re: calling the patient's daughter. Will follow-up with the patient and family later today for further transition of care planning.      Martha Trujillo RN.  [facility]  [phone]

## 2021-09-02 NOTE — DISCHARGE SUMMARY
Physician Discharge Summary     Patient ID:  Marcelle Cain  [id number]  89 y.o.  3/14/1933    Admit date: 8/27/2021    Discharge date and time: 9/2/2021    Admission Diagnoses:   Patient Active Problem List   Diagnosis    Syncope and collapse    Fatigue    Systolic hypertension    Mitral regurgitation    Aortic regurgitation    Lymphedema of lower extremity    Esophageal dysphagia    Gastric lymphoma (HCC)    Mixed hyperlipidemia    Sleep disorder    Lumbar disc disease    Chest wall trauma    Blunt injury of abdomen    Closed fracture of sternum    Fracture of body of sternum, initial encounter for closed fracture    Leg swelling    Bilateral carotid bruits    Carotid stenosis, right    Spider veins    Varicose veins of right lower extremity with pain    H/O burning pain in leg    Fall from standing    Fracture of thoracic spine ([facility]    Closed fracture of cervical spine (HCC)    Acute decompensated heart failure ([facility]       Discharge Diagnoses: Volume overload, severe dementia, urinary tract infection    Consults: Cardiology    Procedures: Echocardiogram    Hospital CoursePatient admitted to telemetry for evaluation of CHF  Continue IV diuresis- transition to po Bumex 1 mg daily at discharge  Resume home Toprol dose-patient went into A. fib RVR last night  - dr. Sanam Avila appreciated   Await echocardiogram-EF 49%  Cycle troponins to ensure no acute event leading to flash pulmonary edema- no elevation in enzymes   Monitor pleural effusion, may need to be tapped if no improvement with diuretic  Supplement electrolytes  Daily labs  Toradol every 6 hours as needed as at home  Check lumbar x-ray secondary to patient sustained a fall a few days ago and complaining of pain since then per family  Await MRI thoracic spine to assess acuity of [id number], neurosurgery has signed off     Family has decided to take patient home with hospice care      No results for input(s): WBC, HGB, HCT, PLT in the last 72 hours. Recent Labs     09/01/21  0446      K 3.4*   CL 93*   CO2 35*   BUN 20   CREATININE 0.7   CALCIUM 8.8       XR CHEST PORTABLE    Result Date: 8/27/2021  EXAMINATION: ONE XRAY VIEW OF THE CHEST 8/27/2021 11:02 pm COMPARISON: 11/19/2020. HISTORY: ORDERING SYSTEM PROVIDED HISTORY: hypoxic resp failure TECHNOLOGIST PROVIDED HISTORY: Reason for exam:->hypoxic resp failure What reading provider will be dictating this exam?->CRC FINDINGS: Cardiomegaly with pulmonary vascular congestive changes. Moderate-sized left pleural effusion. No pneumothorax. .  The osseous structures are without acute process. Cardiomegaly with pulmonary vascular congestive changes in moderate-sized left pleural effusion. Discharge Exam:    HEENT: NCAT,  PERRLA, No JVD  Heart:  RRR, no murmurs, gallops, or rubs.   Lungs:  CTA bilaterally, no wheeze, rales or rhonchi  Abd: bowel sounds present, nontender, nondistended, no masses  Extrem:  No clubbing, cyanosis, or edema    Disposition: home     Patient Condition at Discharge: Stable guarded prognosis    Patient Instructions:      Medication List      START taking these medications    bumetanide 1 MG tablet  Commonly known as: BUMEX  Take 1 tablet by mouth daily        CONTINUE taking these medications    aspirin 81 MG EC tablet     CoQ10 100 MG Caps     docusate sodium 100 MG capsule  Commonly known as: COLACE     FLUoxetine 10 MG tablet  Commonly known as: PROZAC     gabapentin 100 MG capsule  Commonly known as: NEURONTIN     galantamine 4 MG tablet  Commonly known as: Razadyne  Take 1 tablet by mouth daily (with breakfast)     haloperidol 0.5 MG tablet  Commonly known as: HALDOL     irbesartan 300 MG tablet  Commonly known as: AVAPRO     lidocaine 4 % external patch     metoprolol succinate 25 MG extended release tablet  Commonly known as: TOPROL XL     MULTIVITAMIN PO     pantoprazole 40 MG tablet  Commonly known as: PROTONIX     PreserVision AREDS 2 Caps     traMADol 50 MG tablet  Commonly known as: ULTRAM     traZODone 50 MG tablet  Commonly known as: DESYREL  Take 1.5 tablets by mouth nightly     TYLENOL ARTHRITIS PAIN PO           Where to Get Your Medications      These medications were sent to [facility] [address] [address]    Phone: [phone]   · bumetanide 1 MG tablet       Activity: activity as tolerated  Diet: regular diet    Pt has been advised to: Follow-up with Marielle Ryan MD in 1 week.   Follow-up with consultants as recommended by them    Note that over 30 minutes was spent in preparing discharge papers, discussing discharge with patient, medication review, etc.    Signed:  Leslie Oliveira MD  9/2/2021  9:29 AM

## 2021-09-02 NOTE — CARE COORDINATION
Spoke with Jaci Hernandez, liaison with [place]. They are ready for the patient to discharge to home today. They are requesting a 2 or 2:30 transport time home for the patient. Per nursing in [facility], patient is now on room air and saturating in the 90's. Call placed to [facility] and transportation arranged via ambulance home for the patient for 14:30 pick-up time. Jing with [facility] notified and call also placed to the patient's [facility] and [facility] VM left with transition of care planning information. Transfer paperwork provided to nursing. Charge nurse Deangelo Valdivia and bedside nurse Tracy Cook also aware. Will continue to follow.       Jennifer Haines RN.  Vicky Mcfarlane  [phone]

## 2021-09-02 NOTE — PLAN OF CARE
refrain from responding to false sensory perceptions  Outcome: Met This Shift  Goal: Demonstrates accurate environmental perceptions  Description: Demonstrates accurate environmental perceptions  Outcome: Met This Shift  Goal: Able to distinguish between reality-based and nonreality-based thinking  Description: Able to distinguish between reality-based and nonreality-based thinking  Outcome: Met This Shift  Goal: Able to interrupt nonreality-based thinking  Description: Able to interrupt nonreality-based thinking  Outcome: Met This Shift     Problem: Sleep Pattern Disturbance:  Goal: Appears well-rested  Description: Appears well-rested  Outcome: Met This Shift     Problem: Pain:  Goal: Pain level will decrease  Description: Pain level will decrease  Outcome: Met This Shift  Goal: Control of acute pain  Description: Control of acute pain  Outcome: Met This Shift  Goal: Control of chronic pain  Description: Control of chronic pain  Outcome: Met This Shift     Problem: Skin Integrity:  Goal: Will show no infection signs and symptoms  Description: Will show no infection signs and symptoms  Outcome: Met This Shift  Goal: Absence of new skin breakdown  Description: Absence of new skin breakdown  Outcome: Met This Shift     Problem: Falls - Risk of:  Goal: Absence of physical injury  Description: Absence of physical injury  Outcome: Met This Shift  Goal: Will remain free from falls  Description: Will remain free from falls  Outcome: Met This Shift     Problem: Cardiac:  Goal: Ability to maintain vital signs within normal range will improve  Description: Ability to maintain vital signs within normal range will improve  Outcome: Met This Shift  Goal: Cardiovascular alteration will improve  Description: Cardiovascular alteration will improve  Outcome: Met This Shift     Problem: Health Behavior:  Goal: Will modify at least one risk factor affecting health status  Description: Will modify at least one risk factor affecting health status  Outcome: Met This Shift  Goal: Identification of resources available to assist in meeting health care needs will improve  Description: Identification of resources available to assist in meeting health care needs will improve  Outcome: Met This Shift     Problem: Physical Regulation:  Goal: Complications related to the disease process, condition or treatment will be avoided or minimized  Description: Complications related to the disease process, condition or treatment will be avoided or minimized  Outcome: Met This Shift

## 2021-09-03 NOTE — CARE COORDINATION
[facility] Transitions Initial Follow Up Call    Call within 2 business days of discharge: Yes    Patient: Kandace Sheppard Patient : 3/14/1933   MRN: [id number]  Reason for Admission: acute decompensated heart failure   Discharge Date: 21 RARS: Readmission Risk Score: 19      Last Discharge [address] [address]       Complaint Diagnosis Description Type Department Provider    21 Shortness of Breath; Chest Pain Acute decompensated heart failure ([facility] [address] . .. ED to Hosp-Admission (Discharged) (ADMITTED) Yaima Anthony MD; Marybeth Adorno MD; S... Facility: [facility]      Non-face-to-face services provided:  Communication with home health agencies or other community services the patient is currently using-[facility] [address] [place] [address] & [facility] 2021.      Follow Up  Future Appointments   Date Time Provider Ana Youngblood   2021  1:00 PM Thai Levi MD [facility]. [facility]   12/15/2021  3:40 PM SUKHDEEP Tovar - CNS [facility]   12/15/2022  9:30 AM Lissy Ferrari MD [facility]       Sanya Barboza RN

## 2021-09-05 NOTE — PROGRESS NOTES
Physician Progress Note      Dwight Vale  NEHEMIAS #:                  [id number]  :                       3/14/1933  ADMIT DATE:       2021 10:23 PM  [address] DATE:        2021 2:43 PM  RESPONDING  PROVIDER #:        ROME Borrero MD          QUERY TEXT:    Patient admitted with Acute on chronic heart failure, noted to have atrial   fibrillation. If possible, please document in progress notes and discharge   summary further specificity regarding the type of atrial fibrillation: The medical record reflects the following:  Risk Factors: Heart failure  Clinical Indicators: per progress note  Resume home Toprol dose-patient   went into A. fib RVR last night; heart rate findings   Treatment: Tele monitoring, Toprol home dose    Chronic: nonspecific term that could be referring to paroxysmal, persistent,   or permanent  Longstanding persistent: persistent and continuous, lasting > 1 year. Paroxysmal - self-terminating or intermittent; resolves with or without   intervention within 7 days of onset; may recur with various frequency. Persistent - Fails to terminate within 7 days; Often requires meds or   cardioversion to restore to NSR. Permanent - longstanding & persistent; Medication has been ineffective in   restoring NSR &/or cardioversion is contraindicated    Definitions per [employer] Training Guide and Quick Reference Guide, [address] 5   Diseases and Disorders of the Circulatory System; 2019; [employer]. Software content   from the [employer]?  [employer]    Thank you  Mesfin PITT, RN, CCDS  Clinical Documentation Improvement  Options provided:  -- Paroxysmal Atrial Fibrillation  -- Longstanding Persistent Atrial Fibrillation  -- Permanent Atrial Fibrillation  -- Persistent Atrial Fibrillation  -- Chronic Atrial Fibrillation, unspecified  -- Other - I will add my own diagnosis  -- Disagree - Not applicable / Not valid  -- Disagree - Clinically unable to determine / Unknown  -- Refer to Clinical Documentation Reviewer    PROVIDER RESPONSE TEXT:    This patient has paroxysmal atrial fibrillation.     Query created by: Junior Odonnell on 8/7/0927 76:33 AM      Electronically signed by:  ROME Casey MD 9/5/2021 10:40 AM

## 2021-09-07 PROBLEM — M54.50 ACUTE MIDLINE LOW BACK PAIN WITHOUT SCIATICA: Status: ACTIVE | Noted: 2021-01-01

## 2021-09-08 NOTE — TELEPHONE ENCOUNTER
As FYI -- Daughter called to cancel 9/27 OV appt. States pt is now on Hospice service because of CHF. Will call back if needed.

## 2023-01-02 NOTE — PROGRESS NOTES
Ms. Eric Lima was seen for cognitive therapy today. Therapy targeted:    · Patient brought in her ipad. Patient read and answered questions pertaining to a calendar. She used the ipad calendar dez to answer the questions. She demonstrated 61% accuracy. · Patient originally cancelled today's appointment, but then showed up. She thought she had called to tell the therapist that she was coming. She forgot her glasses. Her sister found the glasses and her keys on the car floor. · Patient's added events onto her calendar with total assistance. Continue plan of care. Treatment plan and goals can be found in the initial evaluation report. You need to stay home and quarantine until your test results have returned.      Symptoms are likely related to a viral illness, which can take anywhere from 7-10 days to get better or resolved, typically on its own, without any need for further care.  Cough may linger for an additional week.    Continue supportive cares, push fluids, getting 6-8 glasses of water daily.   Humidifier or vaporizer in the bedroom at night.   For nasal congestion, nasal saline spray or rinse may be helpful.  Use Mucinex in the blue/white box to break up mucous (600- 1200 mg twice daily)  Flonase nasal spray once daily will help with sinus congestion/inflamation (2 sprays once daily for the next 2-3 weeks).     For the sore throat:  Alternate Tylenol and ibuprofen as needed.  Saltwater gargles 2-3 times daily, soft diet, Cepacol lozenges.  Push fluids and run a vaporizer in the bedroom at nighttime.     For cough, honey (or Zarbee's) is a natural cough remedy, and when taken with warm water, can help soothe the throat. Over the counter cough and cold preps can help with symptoms of cough, congestion, and can be taken as directed.         Rest and take it easy. Wash your hands well and often, cover your cough, no sharing beverages, food, straws, utensils, chap stick, etc. to prevent spread of illness.     With new or worsening symptoms, like high fever, developing a worsening cough, shortness of breath or trouble breathing, please follow up for further evaluation.